# Patient Record
Sex: FEMALE | Race: BLACK OR AFRICAN AMERICAN | NOT HISPANIC OR LATINO | Employment: UNEMPLOYED | URBAN - METROPOLITAN AREA
[De-identification: names, ages, dates, MRNs, and addresses within clinical notes are randomized per-mention and may not be internally consistent; named-entity substitution may affect disease eponyms.]

---

## 2018-01-24 ENCOUNTER — OFFICE VISIT (OUTPATIENT)
Dept: FAMILY MEDICINE CLINIC | Facility: CLINIC | Age: 33
End: 2018-01-24
Payer: COMMERCIAL

## 2018-01-24 VITALS
WEIGHT: 180 LBS | BODY MASS INDEX: 35.34 KG/M2 | SYSTOLIC BLOOD PRESSURE: 110 MMHG | HEIGHT: 60 IN | HEART RATE: 97 BPM | RESPIRATION RATE: 16 BRPM | OXYGEN SATURATION: 99 % | DIASTOLIC BLOOD PRESSURE: 72 MMHG

## 2018-01-24 DIAGNOSIS — F41.1 GAD (GENERALIZED ANXIETY DISORDER): ICD-10-CM

## 2018-01-24 DIAGNOSIS — F17.210 CIGARETTE NICOTINE DEPENDENCE WITHOUT COMPLICATION: ICD-10-CM

## 2018-01-24 DIAGNOSIS — N63.0 BREAST LUMP IN FEMALE: ICD-10-CM

## 2018-01-24 DIAGNOSIS — L83 AN (ACANTHOSIS NIGRICANS): ICD-10-CM

## 2018-01-24 DIAGNOSIS — Z00.00 ANNUAL PHYSICAL EXAM: Primary | ICD-10-CM

## 2018-01-24 DIAGNOSIS — Z76.89 ENCOUNTER TO ESTABLISH CARE: ICD-10-CM

## 2018-01-24 PROCEDURE — 99385 PREV VISIT NEW AGE 18-39: CPT | Performed by: FAMILY MEDICINE

## 2018-01-24 RX ORDER — ALPRAZOLAM 0.25 MG/1
0.25 TABLET ORAL
COMMUNITY
End: 2018-02-09

## 2018-01-24 RX ORDER — BUPROPION HYDROCHLORIDE 150 MG/1
150 TABLET, EXTENDED RELEASE ORAL
COMMUNITY
End: 2018-03-06 | Stop reason: SDUPTHER

## 2018-01-24 NOTE — PROGRESS NOTES
Assessment/Plan:    No problem-specific Assessment & Plan notes found for this encounter  Diagnoses and all orders for this visit:    Encounter to establish care    Breast lump in female  -     US breast left limited (diagnostic)    STELLA (generalized anxiety disorder)  -     TSH baseline; Future  -     Ambulatory referral to behavioral health therapists; Future  -     buPROPion (WELLBUTRIN SR) 150 mg 12 hr tablet; Take 150 mg by mouth  -     ALPRAZolam (XANAX) 0 25 mg tablet; Take 0 25 mg by mouth daily at bedtime as needed for anxiety or sleep  - RTO in 2wks for f/u     Cigarette nicotine dependence without complication  - not ready to quit     Annual physical exam  -     CBC and differential; Future  -     Cholesterol, total; Future  -     Comprehensive metabolic panel; Future    AN (acanthosis nigricans)       Subjective:      Patient ID: Yamileth Barrett is a 34 y o  female      31yo AAF presents to the office with her  to establish care and for an annual   1) HM  - last OV was in  with [facility]  - per pt last PE was 8yrs ago   - PMHx:  x3   - PSHx: none  - Allergies: NKDA, (+) seasonal allergies   - Meds: none   - FHx: M (HTN), F (anemia), S ( - leukemia), S (fibroids), MU (thyroid disease), MGM/PGM (DM), MGM (breast ca with brain mets - dx in her 46s), children with autism denies FHx of colon ca/cervical ca/sudden cardiac death    - Ob/Gyn: last visit was in , last PAP 2016 (nml, denies h/o abnml PAPs), FDLMP: 1/3/2018, gets monthly but irregular since ; no birth control/no condoms   - last vision: no glasses/contact  - last dental: last year; overdue   - Hm: no flu vaccine this season, last Tdap in   - Shx: current smoker - 3cigs/day (1PPD/16yrs), (+) EtOH (wine), denies illicits   2) Generalized Anxiety  - gets anxious when driving and when going into a store  - felt like "everything was closing in on her", felt like "being attacked"   - gets panicked when has to drive on the highway  - feels like "brain is on overload"  - started last year - bought a house last year   - cannot sleep - gets maybe 1hr of sleep at night   - denies personal h/o of eating disorder or seizures   - has not done anything to try to manage this        The following portions of the patient's history were reviewed and updated as appropriate: allergies, current medications, past family history, past medical history, past social history, past surgical history and problem list     Review of Systems   Constitutional: Negative for appetite change, chills and fever  HENT: Negative for congestion, ear pain, rhinorrhea, sinus pressure and sore throat  Eyes: Negative for discharge and visual disturbance  Respiratory: Negative for cough, shortness of breath and wheezing  Cardiovascular: Negative for chest pain, palpitations and leg swelling  Gastrointestinal: Negative for abdominal pain, blood in stool, constipation, diarrhea, nausea and vomiting  Endocrine: Negative for polydipsia and polyuria  Genitourinary: Negative for dysuria, flank pain and hematuria  Skin: Negative for rash  Neurological: Negative for dizziness, numbness and headaches  Hematological: Does not bruise/bleed easily  Psychiatric/Behavioral: Positive for sleep disturbance  The patient is nervous/anxious  And as noted per HPI      Objective:     Physical Exam   Constitutional: She is oriented to person, place, and time  She appears well-developed and well-nourished  She is active  No distress  HENT:   Head: Normocephalic and atraumatic  Right Ear: Tympanic membrane, external ear and ear canal normal    Left Ear: Tympanic membrane, external ear and ear canal normal    Nose: Nose normal  No rhinorrhea or septal deviation  Right sinus exhibits no maxillary sinus tenderness and no frontal sinus tenderness  Left sinus exhibits no maxillary sinus tenderness and no frontal sinus tenderness     Mouth/Throat: Uvula is midline, oropharynx is clear and moist and mucous membranes are normal  No oral lesions  Normal dentition  No oropharyngeal exudate or tonsillar abscesses  Eyes: Conjunctivae, EOM and lids are normal  Pupils are equal, round, and reactive to light  Right eye exhibits no discharge  Left eye exhibits no discharge  No scleral icterus  Neck: Trachea normal  No tracheal deviation present  No thyromegaly present  Cardiovascular: Normal rate, regular rhythm, S1 normal and S2 normal   Exam reveals no gallop and no friction rub  No murmur heard  Pulmonary/Chest: Effort normal and breath sounds normal  No accessory muscle usage  No respiratory distress  She has no wheezes  She has no rhonchi  She has no rales  Abdominal: Soft  Normal appearance and bowel sounds are normal  She exhibits no distension and no mass  There is no hepatosplenomegaly  There is no tenderness  There is no rebound, no guarding and no CVA tenderness  Genitourinary:   Genitourinary Comments: Noted to have a small mobile mass on her L-breast near the nipple, no nipple discharge or axillary LN   Musculoskeletal: She exhibits no edema or tenderness  Lymphadenopathy:     She has no cervical adenopathy  Neurological: She is alert and oriented to person, place, and time  She has normal strength  No sensory deficit  Coordination and gait normal    Skin: Skin is warm  No rash noted  No pallor    (+) acanthosis nigricans    Psychiatric: Her speech is normal and behavior is normal  Judgment normal  Her mood appears anxious   Cognition and memory are normal

## 2018-01-31 ENCOUNTER — HOSPITAL ENCOUNTER (OUTPATIENT)
Dept: RADIOLOGY | Facility: HOSPITAL | Age: 33
Discharge: HOME | End: 2018-01-31

## 2018-01-31 ENCOUNTER — APPOINTMENT (OUTPATIENT)
Dept: LAB | Facility: CLINIC | Age: 33
End: 2018-01-31
Payer: COMMERCIAL

## 2018-01-31 ENCOUNTER — HOSPITAL ENCOUNTER (OUTPATIENT)
Dept: RADIOLOGY | Facility: HOSPITAL | Age: 33
Discharge: HOME | End: 2018-01-31
Payer: COMMERCIAL

## 2018-01-31 ENCOUNTER — TRANSCRIBE ORDERS (OUTPATIENT)
Dept: LAB | Facility: CLINIC | Age: 33
End: 2018-01-31

## 2018-01-31 DIAGNOSIS — F41.1 GAD (GENERALIZED ANXIETY DISORDER): ICD-10-CM

## 2018-01-31 DIAGNOSIS — R92.8 ABNORMAL MAMMOGRAM: ICD-10-CM

## 2018-01-31 DIAGNOSIS — R92.8 ABNORMAL MAMMOGRAM: Primary | ICD-10-CM

## 2018-01-31 DIAGNOSIS — Z00.00 ANNUAL PHYSICAL EXAM: ICD-10-CM

## 2018-01-31 LAB
ALBUMIN SERPL BCP-MCNC: 3.7 G/DL (ref 3.5–5)
ALP SERPL-CCNC: 136 U/L (ref 46–116)
ALT SERPL W P-5'-P-CCNC: 38 U/L (ref 12–78)
ANION GAP SERPL CALCULATED.3IONS-SCNC: 7 MMOL/L (ref 4–13)
AST SERPL W P-5'-P-CCNC: 32 U/L (ref 5–45)
BASOPHILS # BLD AUTO: 0.04 THOUSANDS/ΜL (ref 0–0.1)
BASOPHILS NFR BLD AUTO: 1 % (ref 0–1)
BILIRUB SERPL-MCNC: 0.44 MG/DL (ref 0.2–1)
BUN SERPL-MCNC: 8 MG/DL (ref 5–25)
CALCIUM SERPL-MCNC: 9.1 MG/DL (ref 8.3–10.1)
CHLORIDE SERPL-SCNC: 104 MMOL/L (ref 100–108)
CHOLEST SERPL-MCNC: 204 MG/DL (ref 50–200)
CO2 SERPL-SCNC: 26 MMOL/L (ref 21–32)
CREAT SERPL-MCNC: 0.82 MG/DL (ref 0.6–1.3)
EOSINOPHIL # BLD AUTO: 0.32 THOUSAND/ΜL (ref 0–0.61)
EOSINOPHIL NFR BLD AUTO: 5 % (ref 0–6)
ERYTHROCYTE [DISTWIDTH] IN BLOOD BY AUTOMATED COUNT: 13.4 % (ref 11.6–15.1)
GFR SERPL CREATININE-BSD FRML MDRD: 109 ML/MIN/1.73SQ M
GLUCOSE P FAST SERPL-MCNC: 93 MG/DL (ref 65–99)
HCT VFR BLD AUTO: 38.6 % (ref 34.8–46.1)
HGB BLD-MCNC: 12.9 G/DL (ref 11.5–15.4)
LYMPHOCYTES # BLD AUTO: 2.35 THOUSANDS/ΜL (ref 0.6–4.47)
LYMPHOCYTES NFR BLD AUTO: 33 % (ref 14–44)
MCH RBC QN AUTO: 30.3 PG (ref 26.8–34.3)
MCHC RBC AUTO-ENTMCNC: 33.4 G/DL (ref 31.4–37.4)
MCV RBC AUTO: 91 FL (ref 82–98)
MONOCYTES # BLD AUTO: 0.69 THOUSAND/ΜL (ref 0.17–1.22)
MONOCYTES NFR BLD AUTO: 10 % (ref 4–12)
NEUTROPHILS # BLD AUTO: 3.65 THOUSANDS/ΜL (ref 1.85–7.62)
NEUTS SEG NFR BLD AUTO: 51 % (ref 43–75)
NRBC BLD AUTO-RTO: 0 /100 WBCS
PLATELET # BLD AUTO: 368 THOUSANDS/UL (ref 149–390)
PMV BLD AUTO: 10.1 FL (ref 8.9–12.7)
POTASSIUM SERPL-SCNC: 4 MMOL/L (ref 3.5–5.3)
PROT SERPL-MCNC: 9.4 G/DL (ref 6.4–8.2)
RBC # BLD AUTO: 4.26 MILLION/UL (ref 3.81–5.12)
SODIUM SERPL-SCNC: 137 MMOL/L (ref 136–145)
TSH SERPL DL<=0.05 MIU/L-ACNC: 2.5 UIU/ML
WBC # BLD AUTO: 7.07 THOUSAND/UL (ref 4.31–10.16)

## 2018-01-31 PROCEDURE — 80053 COMPREHEN METABOLIC PANEL: CPT

## 2018-01-31 PROCEDURE — 85025 COMPLETE CBC W/AUTO DIFF WBC: CPT

## 2018-01-31 PROCEDURE — 82465 ASSAY BLD/SERUM CHOLESTEROL: CPT

## 2018-01-31 PROCEDURE — 76642 ULTRASOUND BREAST LIMITED: CPT

## 2018-01-31 PROCEDURE — 36415 COLL VENOUS BLD VENIPUNCTURE: CPT

## 2018-01-31 PROCEDURE — 77066 DX MAMMO INCL CAD BI: CPT

## 2018-01-31 PROCEDURE — 84443 ASSAY THYROID STIM HORMONE: CPT

## 2018-02-05 ENCOUNTER — OFFICE VISIT (OUTPATIENT)
Dept: BEHAVIORAL/MENTAL HEALTH CLINIC | Facility: CLINIC | Age: 33
End: 2018-02-05
Payer: COMMERCIAL

## 2018-02-05 DIAGNOSIS — F41.1 GAD (GENERALIZED ANXIETY DISORDER): Primary | ICD-10-CM

## 2018-02-05 PROCEDURE — 90834 PSYTX W PT 45 MINUTES: CPT | Performed by: SOCIAL WORKER

## 2018-02-05 NOTE — PROGRESS NOTES
Assessment/Plan:      Diagnoses and all orders for this visit:    STELLA (generalized anxiety disorder)          Subjective:     Patient ID: Edouard Neal is a 34 y o  female  D:Met with pt for an initial session  Pt states that "the doctor says I have anxiety"  Pt reports that she feels she is stressed but that she has not considered in the past that she was anxious  Discussed pt's triggers being grocery stores and driving  Pt states that she often feels "trapped", "closed in", or that she is hyperventilating  Discussed how pt has been coping with these feelings up until this point and praised pt for being able to cope on her own  Discussed pt's racing thoughts and encouraged her to try witting some things down and keeping lists to assist with her tendencies to fixate on things she needs to do  Pt will follow up in 3 weeks for continued support  A: Pt was prescribed Wellbutrin and Xanax as needed  Pt reports that she does not feel any different on the Wellbutrin and that she has not taken the Xanax often  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, 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  Pt is consistently having to go to the school to help with situations pertaining to her children  Pt wanted to return to work but has not been able to  Pt's  is "what he calls supportive" but pt reports it is not always in the best way for her  Pt does not sleep much at night due to her racing thoughts  P: Pt is going to start trying to write more things down to keep them on track and off her mind  Pt will follow up with this worker in 3 weeks as she was not ready yet to commit to ongoing OP therapy           Review of Systems   Psychiatric/Behavioral: Positive for decreased concentration and sleep disturbance  Negative for agitation, behavioral problems, confusion, dysphoric mood, hallucinations, self-injury and suicidal ideas  The patient is nervous/anxious   The patient is not hyperactive  Objective:     Physical Exam   Psychiatric: Her speech is normal and behavior is normal  Judgment and thought content normal  Her mood appears anxious   Cognition and memory are normal

## 2018-02-09 ENCOUNTER — OFFICE VISIT (OUTPATIENT)
Dept: FAMILY MEDICINE CLINIC | Facility: CLINIC | Age: 33
End: 2018-02-09
Payer: COMMERCIAL

## 2018-02-09 VITALS
OXYGEN SATURATION: 98 % | WEIGHT: 179 LBS | SYSTOLIC BLOOD PRESSURE: 102 MMHG | BODY MASS INDEX: 35.14 KG/M2 | DIASTOLIC BLOOD PRESSURE: 74 MMHG | RESPIRATION RATE: 16 BRPM | HEART RATE: 105 BPM | HEIGHT: 60 IN

## 2018-02-09 DIAGNOSIS — E66.9 OBESITY (BMI 35.0-39.9 WITHOUT COMORBIDITY): ICD-10-CM

## 2018-02-09 DIAGNOSIS — J06.9 VIRAL UPPER RESPIRATORY ILLNESS: ICD-10-CM

## 2018-02-09 DIAGNOSIS — F17.219 CIGARETTE NICOTINE DEPENDENCE WITH NICOTINE-INDUCED DISORDER: ICD-10-CM

## 2018-02-09 DIAGNOSIS — F41.1 GAD (GENERALIZED ANXIETY DISORDER): Primary | ICD-10-CM

## 2018-02-09 DIAGNOSIS — G47.30 SLEEP APNEA, UNSPECIFIED TYPE: ICD-10-CM

## 2018-02-09 DIAGNOSIS — F41.0 PANIC ATTACKS: ICD-10-CM

## 2018-02-09 PROCEDURE — 99214 OFFICE O/P EST MOD 30 MIN: CPT | Performed by: FAMILY MEDICINE

## 2018-02-09 RX ORDER — ALPRAZOLAM 0.5 MG/1
0.5 TABLET ORAL
Qty: 10 TABLET | Refills: 0 | Status: SHIPPED | OUTPATIENT
Start: 2018-02-09 | End: 2018-05-24 | Stop reason: SDUPTHER

## 2018-02-09 NOTE — PROGRESS NOTES
Assessment/Plan:    No problem-specific Assessment & Plan notes found for this encounter  Diagnoses and all orders for this visit:    STELLA (generalized anxiety disorder)  -    Cont Wellbutrin 300mg QD and tolerating medication well   - denies SI/HI  - new rx for ALPRAZolam (XANAX) 0 5 mg tablet; Take 1 tablet (0 5 mg total) by mouth daily at bedtime as needed for anxiety (NOTE: increased dose from last OV - went from 0 25mg to 0 5)   - encouraged follow-up with Ventura Salguero and to find a long-term therapist   - Román Saini in 2wks for f/u     Cigarette nicotine dependence with nicotine-induced disorder    Sleep apnea, unspecified type  -     Ambulatory referral to Sleep Medicine; Future    Viral upper respiratory illness  - advised supportive care with [employer], Robitussin for cough  - encouraged fluids, hot tea with honey to soothe the throat  - encouraged OTC nasal spray and using a humidifier at home at night   - educated that can take 7-10days to resolve  - frequent hand-washing to prevent the spread of infection   - advised to call the office IF have a fever >101, cough not improving        Subjective:      Patient ID: Ramiro Ferrari is a 34 y o  female      31yo F presents to the office for f/u  1) Cold s/s  - started yesterday   - s/s: (+) runny nose/HA/sinus pressure/sore throat/chest tenderness/cough productive for phlegm  - denies F/C/N/V/ear-ache/abd pain/D/C/urinary symptoms   - has been tolerating fluids (drinking tea) but no real appetite   - has chronically been waking up in the middle of the night gasping for year   - has not tried any OTC meds  - did not get the flu vaccine this season  - (+) sick contacts - kids sick (just diagnosed with Strep)   2) Generalized Anxiety  - currently on Wellbutrin 300mg QD and on Xanax 0 25mg QHS PRN (was given 10days worth at last OV)  - did see Ventura Salguero on the 5th and was advised to find a long-term therapist   - has her next appt with Tex Callejas on 2/26  - had a "bad panic attack" on Monday - felt like the "world was closing in",  was "yelling at her" to calm down   - feels like "brain is on overload"  - started last year - bought a house last year   - cannot sleep - gets maybe 1hr of sleep at night   - denies personal h/o of eating disorder or seizures   3) Diagnostics  - discussed labs and breast US/Mammo results with pt         The following portions of the patient's history were reviewed and updated as appropriate: allergies, current medications, past family history, past medical history, past social history, past surgical history and problem list     Review of Systems  as per HPI     Objective:    Vitals:    02/09/18 1036   BP: 102/74   Pulse: 105   Resp: 16   SpO2: 98%        Physical Exam   Constitutional: She is oriented to person, place, and time  She appears well-developed and well-nourished  No distress  HENT:   Head: Normocephalic and atraumatic  Right Ear: External ear normal    Left Ear: External ear normal    Nose: Nose normal    Mouth/Throat: Oropharynx is clear and moist  No oropharyngeal exudate  Eyes: Conjunctivae and EOM are normal  Pupils are equal, round, and reactive to light  Right eye exhibits no discharge  Left eye exhibits no discharge  No scleral icterus  Neck: Normal range of motion  Neck supple  Cardiovascular: Normal rate and regular rhythm  Exam reveals no gallop and no friction rub  No murmur heard  Pulmonary/Chest: Effort normal and breath sounds normal  No respiratory distress  She has no wheezes  She has no rales  Abdominal: Soft  Bowel sounds are normal  She exhibits no distension  There is no tenderness  Obese abdomen    Neurological: She is alert and oriented to person, place, and time  Skin: Skin is warm  Psychiatric: Her speech is normal and behavior is normal  Judgment and thought content normal  Her mood appears anxious   Cognition and memory are normal

## 2018-03-06 DIAGNOSIS — F41.1 GAD (GENERALIZED ANXIETY DISORDER): Primary | ICD-10-CM

## 2018-03-07 RX ORDER — BUPROPION HYDROCHLORIDE 150 MG/1
150 TABLET, EXTENDED RELEASE ORAL 2 TIMES DAILY
Qty: 60 TABLET | Refills: 0 | Status: SHIPPED | OUTPATIENT
Start: 2018-03-07 | End: 2018-05-24 | Stop reason: ALTCHOICE

## 2018-05-24 ENCOUNTER — TRANSCRIBE ORDERS (OUTPATIENT)
Dept: LAB | Facility: CLINIC | Age: 33
End: 2018-05-24

## 2018-05-24 ENCOUNTER — OFFICE VISIT (OUTPATIENT)
Dept: FAMILY MEDICINE CLINIC | Facility: CLINIC | Age: 33
End: 2018-05-24
Payer: COMMERCIAL

## 2018-05-24 ENCOUNTER — APPOINTMENT (OUTPATIENT)
Dept: LAB | Facility: CLINIC | Age: 33
End: 2018-05-24
Payer: COMMERCIAL

## 2018-05-24 VITALS
BODY MASS INDEX: 35.14 KG/M2 | HEART RATE: 86 BPM | SYSTOLIC BLOOD PRESSURE: 120 MMHG | TEMPERATURE: 97.3 F | RESPIRATION RATE: 18 BRPM | DIASTOLIC BLOOD PRESSURE: 82 MMHG | OXYGEN SATURATION: 99 % | WEIGHT: 179 LBS | HEIGHT: 60 IN

## 2018-05-24 DIAGNOSIS — F41.8 ANXIETY WITH DEPRESSION: Primary | ICD-10-CM

## 2018-05-24 DIAGNOSIS — F41.8 ANXIETY WITH DEPRESSION: ICD-10-CM

## 2018-05-24 DIAGNOSIS — R11.0 NAUSEA: ICD-10-CM

## 2018-05-24 PROBLEM — F32.9 REACTIVE DEPRESSION: Status: ACTIVE | Noted: 2018-05-24

## 2018-05-24 LAB
ALBUMIN SERPL BCP-MCNC: 3.4 G/DL (ref 3.5–5)
ALP SERPL-CCNC: 152 U/L (ref 46–116)
ALT SERPL W P-5'-P-CCNC: 56 U/L (ref 12–78)
ANION GAP SERPL CALCULATED.3IONS-SCNC: 6 MMOL/L (ref 4–13)
AST SERPL W P-5'-P-CCNC: 35 U/L (ref 5–45)
BASOPHILS # BLD AUTO: 0.04 THOUSANDS/ΜL (ref 0–0.1)
BASOPHILS NFR BLD AUTO: 1 % (ref 0–1)
BILIRUB SERPL-MCNC: 0.58 MG/DL (ref 0.2–1)
BUN SERPL-MCNC: 9 MG/DL (ref 5–25)
CALCIUM SERPL-MCNC: 9.2 MG/DL (ref 8.3–10.1)
CHLORIDE SERPL-SCNC: 104 MMOL/L (ref 100–108)
CO2 SERPL-SCNC: 25 MMOL/L (ref 21–32)
CREAT SERPL-MCNC: 0.76 MG/DL (ref 0.6–1.3)
EOSINOPHIL # BLD AUTO: 0.38 THOUSAND/ΜL (ref 0–0.61)
EOSINOPHIL NFR BLD AUTO: 5 % (ref 0–6)
ERYTHROCYTE [DISTWIDTH] IN BLOOD BY AUTOMATED COUNT: 13.8 % (ref 11.6–15.1)
GFR SERPL CREATININE-BSD FRML MDRD: 118 ML/MIN/1.73SQ M
GLUCOSE P FAST SERPL-MCNC: 88 MG/DL (ref 65–99)
HCT VFR BLD AUTO: 40.1 % (ref 34.8–46.1)
HGB BLD-MCNC: 12.8 G/DL (ref 11.5–15.4)
LYMPHOCYTES # BLD AUTO: 2.59 THOUSANDS/ΜL (ref 0.6–4.47)
LYMPHOCYTES NFR BLD AUTO: 32 % (ref 14–44)
MCH RBC QN AUTO: 29.8 PG (ref 26.8–34.3)
MCHC RBC AUTO-ENTMCNC: 31.9 G/DL (ref 31.4–37.4)
MCV RBC AUTO: 93 FL (ref 82–98)
MONOCYTES # BLD AUTO: 0.96 THOUSAND/ΜL (ref 0.17–1.22)
MONOCYTES NFR BLD AUTO: 12 % (ref 4–12)
NEUTROPHILS # BLD AUTO: 4.21 THOUSANDS/ΜL (ref 1.85–7.62)
NEUTS SEG NFR BLD AUTO: 51 % (ref 43–75)
NRBC BLD AUTO-RTO: 0 /100 WBCS
PLATELET # BLD AUTO: 363 THOUSANDS/UL (ref 149–390)
PMV BLD AUTO: 10.8 FL (ref 8.9–12.7)
POTASSIUM SERPL-SCNC: 4 MMOL/L (ref 3.5–5.3)
PROT SERPL-MCNC: 8.9 G/DL (ref 6.4–8.2)
RBC # BLD AUTO: 4.3 MILLION/UL (ref 3.81–5.12)
SL AMB POCT URINE HCG: NORMAL
SODIUM SERPL-SCNC: 135 MMOL/L (ref 136–145)
TSH SERPL DL<=0.05 MIU/L-ACNC: 2.53 UIU/ML (ref 0.36–3.74)
WBC # BLD AUTO: 8.21 THOUSAND/UL (ref 4.31–10.16)

## 2018-05-24 PROCEDURE — 84443 ASSAY THYROID STIM HORMONE: CPT

## 2018-05-24 PROCEDURE — 85025 COMPLETE CBC W/AUTO DIFF WBC: CPT

## 2018-05-24 PROCEDURE — 36415 COLL VENOUS BLD VENIPUNCTURE: CPT

## 2018-05-24 PROCEDURE — 99213 OFFICE O/P EST LOW 20 MIN: CPT | Performed by: NURSE PRACTITIONER

## 2018-05-24 PROCEDURE — 81025 URINE PREGNANCY TEST: CPT | Performed by: NURSE PRACTITIONER

## 2018-05-24 PROCEDURE — 80053 COMPREHEN METABOLIC PANEL: CPT

## 2018-05-24 RX ORDER — ALPRAZOLAM 0.5 MG/1
0.5 TABLET ORAL DAILY PRN
Qty: 10 TABLET | Refills: 0 | Status: SHIPPED | OUTPATIENT
Start: 2018-05-24 | End: 2018-05-24 | Stop reason: SDUPTHER

## 2018-05-24 RX ORDER — ALPRAZOLAM 0.5 MG/1
0.5 TABLET ORAL DAILY PRN
Qty: 10 TABLET | Refills: 0
Start: 2018-05-24 | End: 2019-04-24

## 2018-05-24 NOTE — ASSESSMENT & PLAN NOTE
Discussion on anxiety and depression and treatment options  PDMP checked  Refilled Xanax  Patient denies any suicidal thoughts  Lab work ordered  Patient reports that she would like to discuss daily medication with her PCP, Dr Michael Araujo

## 2018-05-24 NOTE — PROGRESS NOTES
Assessment/Plan:    Anxiety with depression  Discussion on anxiety and depression and treatment options  PDMP checked  Refilled Xanax  Patient instructed to take the xanax daily prn for anxiety  Patient denies any suicidal thoughts  Lab work ordered  Patient reports that she would like to discuss daily medication with her PCP, Dr Priscilla Sanchez  Nausea  Urine HCG done and was negative  Discussed with the patient that her symptoms are most likely caused by her anxiety  Lab work ordered  Diagnoses and all orders for this visit:    Anxiety with depression  -     Comprehensive metabolic panel; Future  -     CBC and differential; Future  -     TSH, 3rd generation with T4 reflex; Future  -     ALPRAZolam (XANAX) 0 5 mg tablet; Take 1 tablet (0 5 mg total) by mouth daily as needed for anxiety    Nausea  -     POCT urine HCG    Patient instructed to follow-up in 5 days with Dr Priscilla Sanchez or sooner prn  Subjective:      Patient ID: Richard Gould is a 28 y o  female  Patient is here for a follow-up for anxiety  Patient's PCP is Dr Priscilla Sanchez  Patient reports that she stopped taking the Wellbutrin for anxiety because she felt itchy while taking it  Patient reports that she never got her last refill for xanax filled because she was feeling less anxious  Patient reports that her depression and anxiety has gotten worse over the past few days  Denies any suicidal or homicidal thoughts  Patient reports lots of social support  Patient reports occasional nausea off and on for a couple of weeks  Denies any abdominal pain, reflux, diarrhea, vomiting, or blood in the stool  Patient reports a lot of stress recently  Patient reports that she has 3 kids and 2 of them are on the autism spectrum  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  Patient reports that she is worried about her health  Patient reports that her anxiety and depression has gotten worse           The following portions of the patient's history were reviewed and updated as appropriate: allergies, current medications, past family history, past medical history, past social history, past surgical history and problem list     Review of Systems   Constitutional: Positive for fatigue  Negative for chills and fever  HENT: Negative for congestion, ear pain, sinus pressure and sore throat  Eyes: Negative for pain, discharge and redness  Respiratory: Negative for cough, chest tightness, shortness of breath and wheezing  Cardiovascular: Negative for chest pain, palpitations and leg swelling  Gastrointestinal: Positive for nausea  Negative for abdominal pain, blood in stool, diarrhea and vomiting  Genitourinary: Negative for dysuria, frequency, hematuria, pelvic pain and urgency  Skin: Negative for rash  Neurological: Negative for dizziness, seizures, syncope, light-headedness, numbness and headaches  Psychiatric/Behavioral: Negative for suicidal ideas  As noted in HPI  Objective:      /82   Pulse 86   Temp (!) 97 3 °F (36 3 °C)   Resp 18   Ht 5' (1 524 m)   Wt 81 2 kg (179 lb)   LMP 04/24/2018   SpO2 99%   BMI 34 96 kg/m²          Physical Exam   Constitutional: She is oriented to person, place, and time  No distress  HENT:   Right Ear: External ear normal    Left Ear: External ear normal    Mouth/Throat: Oropharynx is clear and moist    Eyes: Conjunctivae are normal  Pupils are equal, round, and reactive to light  Neck: Normal range of motion  Cardiovascular: Normal rate, regular rhythm and normal heart sounds  Radial pulses +2 bilaterally  No edema noted  Pulmonary/Chest: Effort normal and breath sounds normal  She has no wheezes  Musculoskeletal:   Gait wnl  Lymphadenopathy:     She has no cervical adenopathy  Neurological: She is alert and oriented to person, place, and time  Skin: No rash noted  Psychiatric:   Patient appears alittle anxious  Vitals reviewed

## 2018-05-24 NOTE — ASSESSMENT & PLAN NOTE
Urine HCG done and was negative  Discussed with the patient that her symptoms are most likely caused by her anxiety  Lab work ordered

## 2018-06-01 ENCOUNTER — OFFICE VISIT (OUTPATIENT)
Dept: FAMILY MEDICINE CLINIC | Facility: CLINIC | Age: 33
End: 2018-06-01
Payer: COMMERCIAL

## 2018-06-01 VITALS
OXYGEN SATURATION: 99 % | HEART RATE: 90 BPM | WEIGHT: 179.4 LBS | HEIGHT: 60 IN | DIASTOLIC BLOOD PRESSURE: 78 MMHG | RESPIRATION RATE: 16 BRPM | SYSTOLIC BLOOD PRESSURE: 112 MMHG | BODY MASS INDEX: 35.22 KG/M2

## 2018-06-01 DIAGNOSIS — E66.9 OBESITY (BMI 35.0-39.9 WITHOUT COMORBIDITY): ICD-10-CM

## 2018-06-01 DIAGNOSIS — R74.8 ELEVATED ALKALINE PHOSPHATASE LEVEL: ICD-10-CM

## 2018-06-01 DIAGNOSIS — F41.8 ANXIETY WITH DEPRESSION: Primary | ICD-10-CM

## 2018-06-01 PROCEDURE — 4004F PT TOBACCO SCREEN RCVD TLK: CPT | Performed by: FAMILY MEDICINE

## 2018-06-01 PROCEDURE — 99214 OFFICE O/P EST MOD 30 MIN: CPT | Performed by: FAMILY MEDICINE

## 2018-06-01 PROCEDURE — 3008F BODY MASS INDEX DOCD: CPT | Performed by: FAMILY MEDICINE

## 2018-06-01 RX ORDER — BUPROPION HYDROCHLORIDE 150 MG/1
150 TABLET ORAL DAILY
Qty: 30 TABLET | Refills: 0 | Status: SHIPPED | OUTPATIENT
Start: 2018-06-01 | End: 2019-04-24

## 2018-06-01 NOTE — PROGRESS NOTES
Assessment/Plan:  No problem-specific Assessment & Plan notes found for this encounter  Diagnoses and all orders for this visit:  Anxiety with depression  - PHQ-9 score of 20 in the office today   - denies SI/HI  - would like to restart Wellbutrin given the positive ADR of nicotine craving withdrawal   - advised to make a f/u appt with [facility]   - restart buPROPion (WELLBUTRIN XL) 150 mg 24 hr tablet; Take 1 tablet (150 mg total) by mouth daily  - RTO in 2wks for f/u     Obesity (BMI 35 0-39 9 without comorbidity)  -  Ambulatory referral to Nutrition Services; Future  - TSH within normal limits  - educated that it takes 3500cal to lose 1lbs and to set realistic weight loss goals  - advised to keep a food journal and to also eat several small meals throughout the day vs not eating as not eating places body in "starvation mood" and propagates weight gain   - encouraged exercise - 150mins/week as per Heart Association   - advised to use the Nutritional Services at [facility] where a nutritionist shops along side with you to help you choose healthy options that fit in your budget   - RTO in 1month for f/u         Subjective:    Patient ID: Neno Palacios is a 28 y o  female    31yo F presents to the office with her daughter for f/u   1) Depression with Anxiety   - last week was "all over the place" - woke up and felt like "was here but wasn't"  - felt an anxiety attack - "an outer body experience"   - couldn't sleep, felt like in a "changed area", took about an hour to calm down   - of note, stopped taking Wellbutrin end of Feb bc felt "itchy" on it and maybe had a rash on her b/l arms and had been "doing good" for awhile   - wakes up nauseous - serum HCG NEG   - 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   - has been trying to exercise: walking Qday - frustrated bc not losing weight   - PHQ-9 20 in the office today   - denies SI/HI  - did meet with [facility] in the past and felt like benefited from therapy - would like to cont with female therapist  2) BMI 35  - weight up [address] since Feb   - has been trying to exercise: walking Qday - frustrated bc not losing weight   - has been trying to follow the [address] for the past 2yrs but notices that she really doesn't eat anything - maybe a salad for dinner  - has not gone to the Nutritionist   - TSH nml, FBS nml       The following portions of the patient's history were reviewed and updated as appropriate: allergies, current medications, past family history, past medical history, past social history, past surgical history and problem list     Review of Systems  as per HPI    Objective:  /78   Pulse 90   Resp 16   Ht 5' (1 524 m)   Wt 81 4 kg (179 lb 6 4 oz)   SpO2 99%   BMI 35 04 kg/m²    Physical Exam   Constitutional: She is oriented to person, place, and time  She appears well-developed and well-nourished  No distress  HENT:   Head: Normocephalic and atraumatic  Nose: Nose normal    Eyes: Conjunctivae and EOM are normal  Right eye exhibits no discharge  Left eye exhibits no discharge  No scleral icterus  Neck: Normal range of motion  Cardiovascular: Normal rate, regular rhythm and normal heart sounds  Exam reveals no gallop and no friction rub  No murmur heard  Pulmonary/Chest: Effort normal and breath sounds normal  No stridor  No respiratory distress  She has no wheezes  She has no rales  Abdominal: Soft  Bowel sounds are normal    Obese abdomen, BMI 35   Musculoskeletal: Normal range of motion  She exhibits no edema, tenderness or deformity  Neurological: She is alert and oriented to person, place, and time  Skin: Skin is warm and dry  She is not diaphoretic  Psychiatric: Her speech is normal and behavior is normal  Judgment and thought content normal  Her mood appears anxious  Cognition and memory are normal  She exhibits a depressed mood  She expresses no homicidal and no suicidal ideation  She expresses no suicidal plans and no homicidal plans  PHQ-9 score of 20   Vitals reviewed

## 2019-04-24 ENCOUNTER — OFFICE VISIT (OUTPATIENT)
Dept: FAMILY MEDICINE CLINIC | Facility: CLINIC | Age: 34
End: 2019-04-24
Payer: COMMERCIAL

## 2019-04-24 VITALS
DIASTOLIC BLOOD PRESSURE: 70 MMHG | WEIGHT: 181 LBS | RESPIRATION RATE: 16 BRPM | BODY MASS INDEX: 35.53 KG/M2 | HEIGHT: 60 IN | OXYGEN SATURATION: 99 % | HEART RATE: 90 BPM | SYSTOLIC BLOOD PRESSURE: 112 MMHG

## 2019-04-24 DIAGNOSIS — Q15.9 EYE ABNORMALITY: ICD-10-CM

## 2019-04-24 DIAGNOSIS — F41.8 ANXIETY WITH DEPRESSION: Primary | ICD-10-CM

## 2019-04-24 DIAGNOSIS — E66.9 OBESITY (BMI 35.0-39.9 WITHOUT COMORBIDITY): ICD-10-CM

## 2019-04-24 PROCEDURE — 99214 OFFICE O/P EST MOD 30 MIN: CPT | Performed by: FAMILY MEDICINE

## 2019-04-24 RX ORDER — ALPRAZOLAM 0.5 MG/1
0.5 TABLET ORAL
Qty: 10 TABLET | Refills: 0 | Status: SHIPPED | OUTPATIENT
Start: 2019-04-24 | End: 2019-08-06 | Stop reason: SDUPTHER

## 2019-04-24 RX ORDER — BUPROPION HYDROCHLORIDE 150 MG/1
150 TABLET ORAL DAILY
Qty: 30 TABLET | Refills: 0 | Status: SHIPPED | OUTPATIENT
Start: 2019-04-24 | End: 2019-05-15 | Stop reason: SDUPTHER

## 2019-05-15 ENCOUNTER — OFFICE VISIT (OUTPATIENT)
Dept: FAMILY MEDICINE CLINIC | Facility: CLINIC | Age: 34
End: 2019-05-15
Payer: COMMERCIAL

## 2019-05-15 VITALS
DIASTOLIC BLOOD PRESSURE: 80 MMHG | WEIGHT: 183 LBS | HEIGHT: 60 IN | HEART RATE: 100 BPM | RESPIRATION RATE: 18 BRPM | SYSTOLIC BLOOD PRESSURE: 124 MMHG | OXYGEN SATURATION: 98 % | BODY MASS INDEX: 35.93 KG/M2

## 2019-05-15 DIAGNOSIS — F17.200 CURRENT EVERY DAY SMOKER: ICD-10-CM

## 2019-05-15 DIAGNOSIS — E66.9 OBESITY (BMI 35.0-39.9 WITHOUT COMORBIDITY): ICD-10-CM

## 2019-05-15 DIAGNOSIS — F41.8 ANXIETY WITH DEPRESSION: Primary | ICD-10-CM

## 2019-05-15 DIAGNOSIS — Q15.9 EYE ABNORMALITY: ICD-10-CM

## 2019-05-15 PROCEDURE — 99214 OFFICE O/P EST MOD 30 MIN: CPT | Performed by: FAMILY MEDICINE

## 2019-05-15 RX ORDER — BUPROPION HYDROCHLORIDE 150 MG/1
150 TABLET ORAL DAILY
Qty: 30 TABLET | Refills: 0 | Status: SHIPPED | OUTPATIENT
Start: 2019-05-15 | End: 2019-08-06 | Stop reason: SDUPTHER

## 2019-06-05 ENCOUNTER — TELEPHONE (OUTPATIENT)
Dept: FAMILY MEDICINE CLINIC | Facility: CLINIC | Age: 34
End: 2019-06-05

## 2019-06-05 ENCOUNTER — OFFICE VISIT (OUTPATIENT)
Dept: FAMILY MEDICINE CLINIC | Facility: CLINIC | Age: 34
End: 2019-06-05
Payer: COMMERCIAL

## 2019-06-05 VITALS
DIASTOLIC BLOOD PRESSURE: 84 MMHG | OXYGEN SATURATION: 97 % | HEIGHT: 60 IN | SYSTOLIC BLOOD PRESSURE: 118 MMHG | HEART RATE: 104 BPM | WEIGHT: 183 LBS | RESPIRATION RATE: 18 BRPM | BODY MASS INDEX: 35.93 KG/M2

## 2019-06-05 DIAGNOSIS — Q15.9 EYE ABNORMALITY: ICD-10-CM

## 2019-06-05 DIAGNOSIS — E66.9 OBESITY (BMI 35.0-39.9 WITHOUT COMORBIDITY): ICD-10-CM

## 2019-06-05 DIAGNOSIS — F41.8 ANXIETY WITH DEPRESSION: Primary | ICD-10-CM

## 2019-06-05 DIAGNOSIS — F17.200 CURRENT EVERY DAY SMOKER: ICD-10-CM

## 2019-06-05 PROCEDURE — 99214 OFFICE O/P EST MOD 30 MIN: CPT | Performed by: FAMILY MEDICINE

## 2019-06-05 PROCEDURE — 3008F BODY MASS INDEX DOCD: CPT | Performed by: FAMILY MEDICINE

## 2019-06-07 ENCOUNTER — TELEPHONE (OUTPATIENT)
Dept: BEHAVIORAL/MENTAL HEALTH CLINIC | Facility: CLINIC | Age: 34
End: 2019-06-07

## 2019-06-10 ENCOUNTER — TELEPHONE (OUTPATIENT)
Dept: BEHAVIORAL/MENTAL HEALTH CLINIC | Facility: CLINIC | Age: 34
End: 2019-06-10

## 2019-06-10 ENCOUNTER — TRANSCRIBE ORDERS (OUTPATIENT)
Dept: ADMINISTRATIVE | Facility: HOSPITAL | Age: 34
End: 2019-06-10

## 2019-06-10 DIAGNOSIS — H53.2 DIPLOPIA: Primary | ICD-10-CM

## 2019-06-10 DIAGNOSIS — R51.9 NONINTRACTABLE HEADACHE, UNSPECIFIED CHRONICITY PATTERN, UNSPECIFIED HEADACHE TYPE: ICD-10-CM

## 2019-06-25 ENCOUNTER — APPOINTMENT (OUTPATIENT)
Dept: LAB | Facility: CLINIC | Age: 34
End: 2019-06-25
Payer: COMMERCIAL

## 2019-06-25 ENCOUNTER — TRANSCRIBE ORDERS (OUTPATIENT)
Dept: LAB | Facility: CLINIC | Age: 34
End: 2019-06-25

## 2019-06-25 DIAGNOSIS — R49.0 DYSPHONIA: Primary | ICD-10-CM

## 2019-06-25 DIAGNOSIS — R49.0 DYSPHONIA: ICD-10-CM

## 2019-06-25 LAB
ALBUMIN SERPL BCP-MCNC: 3.7 G/DL (ref 3.5–5)
ALP SERPL-CCNC: 214 U/L (ref 46–116)
ALT SERPL W P-5'-P-CCNC: 44 U/L (ref 12–78)
ANION GAP SERPL CALCULATED.3IONS-SCNC: 9 MMOL/L (ref 4–13)
AST SERPL W P-5'-P-CCNC: 40 U/L (ref 5–45)
BILIRUB SERPL-MCNC: 0.6 MG/DL (ref 0.2–1)
BUN SERPL-MCNC: 7 MG/DL (ref 5–25)
CALCIUM SERPL-MCNC: 9.7 MG/DL (ref 8.3–10.1)
CHLORIDE SERPL-SCNC: 102 MMOL/L (ref 100–108)
CO2 SERPL-SCNC: 28 MMOL/L (ref 21–32)
CREAT SERPL-MCNC: 0.96 MG/DL (ref 0.6–1.3)
ERYTHROCYTE [SEDIMENTATION RATE] IN BLOOD: 35 MM/HOUR (ref 0–20)
GFR SERPL CREATININE-BSD FRML MDRD: 89 ML/MIN/1.73SQ M
GLUCOSE SERPL-MCNC: 91 MG/DL (ref 65–140)
POTASSIUM SERPL-SCNC: 3.7 MMOL/L (ref 3.5–5.3)
PROT SERPL-MCNC: 8.8 G/DL (ref 6.4–8.2)
SODIUM SERPL-SCNC: 139 MMOL/L (ref 136–145)
TSH SERPL DL<=0.05 MIU/L-ACNC: 1.8 UIU/ML (ref 0.36–3.74)

## 2019-06-25 PROCEDURE — 84238 ASSAY NONENDOCRINE RECEPTOR: CPT

## 2019-06-25 PROCEDURE — 85652 RBC SED RATE AUTOMATED: CPT

## 2019-06-25 PROCEDURE — 84443 ASSAY THYROID STIM HORMONE: CPT

## 2019-06-25 PROCEDURE — 86618 LYME DISEASE ANTIBODY: CPT

## 2019-06-25 PROCEDURE — 83519 RIA NONANTIBODY: CPT

## 2019-06-25 PROCEDURE — 36415 COLL VENOUS BLD VENIPUNCTURE: CPT

## 2019-06-25 PROCEDURE — 80053 COMPREHEN METABOLIC PANEL: CPT

## 2019-06-26 ENCOUNTER — TELEPHONE (OUTPATIENT)
Dept: FAMILY MEDICINE CLINIC | Facility: CLINIC | Age: 34
End: 2019-06-26

## 2019-06-27 ENCOUNTER — CONSULT (OUTPATIENT)
Dept: BARIATRICS | Facility: CLINIC | Age: 34
End: 2019-06-27
Payer: COMMERCIAL

## 2019-06-27 VITALS
TEMPERATURE: 98.8 F | DIASTOLIC BLOOD PRESSURE: 62 MMHG | HEART RATE: 92 BPM | BODY MASS INDEX: 34.29 KG/M2 | WEIGHT: 181.6 LBS | HEIGHT: 61 IN | RESPIRATION RATE: 14 BRPM | SYSTOLIC BLOOD PRESSURE: 100 MMHG

## 2019-06-27 DIAGNOSIS — K21.9 GASTROESOPHAGEAL REFLUX DISEASE, ESOPHAGITIS PRESENCE NOT SPECIFIED: Primary | ICD-10-CM

## 2019-06-27 DIAGNOSIS — E66.9 CLASS 1 OBESITY: ICD-10-CM

## 2019-06-27 DIAGNOSIS — F41.8 ANXIETY WITH DEPRESSION: ICD-10-CM

## 2019-06-27 DIAGNOSIS — R63.5 ABNORMAL WEIGHT GAIN: ICD-10-CM

## 2019-06-27 PROBLEM — E66.811 CLASS 1 OBESITY: Status: ACTIVE | Noted: 2018-02-09

## 2019-06-27 LAB
B BURGDOR IGG SER IA-ACNC: 0.21
B BURGDOR IGM SER IA-ACNC: 0.13

## 2019-06-27 PROCEDURE — 99244 OFF/OP CNSLTJ NEW/EST MOD 40: CPT | Performed by: PHYSICIAN ASSISTANT

## 2019-06-28 PROBLEM — K21.9 GERD (GASTROESOPHAGEAL REFLUX DISEASE): Status: ACTIVE | Noted: 2019-06-28

## 2019-06-28 LAB
ACHR BIND AB SER-SCNC: 0.07 NMOL/L (ref 0–0.24)
ACHR BLOCK AB/ACHR TOTAL SFR SER: 10 % (ref 0–25)

## 2019-06-30 ENCOUNTER — HOSPITAL ENCOUNTER (OUTPATIENT)
Dept: MRI IMAGING | Facility: HOSPITAL | Age: 34
Discharge: HOME | End: 2019-06-30
Attending: OPHTHALMOLOGY
Payer: COMMERCIAL

## 2019-06-30 DIAGNOSIS — R51.9 NONINTRACTABLE HEADACHE, UNSPECIFIED CHRONICITY PATTERN, UNSPECIFIED HEADACHE TYPE: ICD-10-CM

## 2019-06-30 DIAGNOSIS — H53.2 DIPLOPIA: ICD-10-CM

## 2019-06-30 PROCEDURE — A9585 GADOBUTROL INJECTION: HCPCS | Performed by: OPHTHALMOLOGY

## 2019-06-30 PROCEDURE — 70543 MRI ORBT/FAC/NCK W/O &W/DYE: CPT

## 2019-06-30 PROCEDURE — 70553 MRI BRAIN STEM W/O & W/DYE: CPT

## 2019-06-30 RX ADMIN — GADOBUTROL 8 ML: 604.72 INJECTION INTRAVENOUS at 09:33

## 2019-07-04 LAB — ACHR MOD AB/ACHR TOTAL SFR SER: <12 % (ref 0–20)

## 2019-07-25 ENCOUNTER — TELEPHONE (OUTPATIENT)
Dept: FAMILY MEDICINE CLINIC | Facility: CLINIC | Age: 34
End: 2019-07-25

## 2019-07-25 ENCOUNTER — OFFICE VISIT (OUTPATIENT)
Dept: BARIATRICS | Facility: CLINIC | Age: 34
End: 2019-07-25
Payer: COMMERCIAL

## 2019-07-25 VITALS
TEMPERATURE: 98.4 F | RESPIRATION RATE: 14 BRPM | SYSTOLIC BLOOD PRESSURE: 110 MMHG | DIASTOLIC BLOOD PRESSURE: 80 MMHG | WEIGHT: 184.2 LBS | HEIGHT: 61 IN | BODY MASS INDEX: 34.78 KG/M2 | HEART RATE: 95 BPM

## 2019-07-25 DIAGNOSIS — K21.9 GASTROESOPHAGEAL REFLUX DISEASE, ESOPHAGITIS PRESENCE NOT SPECIFIED: ICD-10-CM

## 2019-07-25 DIAGNOSIS — R63.5 ABNORMAL WEIGHT GAIN: Primary | ICD-10-CM

## 2019-07-25 DIAGNOSIS — E66.9 CLASS 1 OBESITY: ICD-10-CM

## 2019-07-25 DIAGNOSIS — F41.8 ANXIETY WITH DEPRESSION: ICD-10-CM

## 2019-07-25 PROCEDURE — 99214 OFFICE O/P EST MOD 30 MIN: CPT | Performed by: PHYSICIAN ASSISTANT

## 2019-07-25 NOTE — TELEPHONE ENCOUNTER
Pt has been seeing [facility], the provider that she sees is going out on medical leave  They wanted her to start a pill for weight loss  She said she can not afford to keep going there every week and wants to know if Dr Radha Campbell give it to her ? I told her that it would need to come from them but she wanted me to send the message anyway

## 2019-07-25 NOTE — ASSESSMENT & PLAN NOTE
-Patient is pursuing [facility]  -Initial weight loss goal of 5-10% weight loss for improved health  -patient reports she cut out all fruit juice and soda  -reports she has been following the 9314-6617 calorie sample menu that was provided to her and has been practicing interval eating instead of having one meal per day  -Also reports increasing her exercise and performing cardio daily  -Recommend patient keep a food log and measure portions for more accuracy  -Given codes to check coverage for REE   Will also check AM cortisol and Prolactin level    Initial: 181 6 lbs  Current: 184 2 lbs  Change: + 2 6 lbs

## 2019-07-25 NOTE — PATIENT INSTRUCTIONS
Goals: Food log (ie ) www [email or web address] com,[email or web address]  com,[email or web address],calorieking  com,etc    No sugary beverages  At least 64oz of water daily  Increase physical activity by 10 minutes daily   Gradually increase physical activity to a goal of 5 days per week for 30 minutes of MODERATE intensity PLUS 2 days per week of FULL BODY resistance training  1064-7881 calories per day  5-10 servings of fruits and vegetables per day   Please try to track calories daily and bring to next appointment  Pepcid 20mg twice per day

## 2019-07-25 NOTE — ASSESSMENT & PLAN NOTE
-followed by PCP and counselor  -recently started on Wellbutrin  -reports she wants to stop Wellbutrin as it has not helped her weight   Advised her not to stop medication without discussing with prescribing provider

## 2019-07-25 NOTE — ASSESSMENT & PLAN NOTE
-complains of heartburn, belching and burping  -relieved with TUMS but is having to take these multiple times per week   -advised avoiding trigger foods, large portion sizes, laying flat for 3 hours after a meal  -Has not trialed Pepcid   Recommend trial of Pepcid 20mg BID until seen at next f/u

## 2019-07-25 NOTE — PROGRESS NOTES
Assessment/Plan:    Class 1 obesity  -Patient is pursuing Conservative Program  -Initial weight loss goal of 5-10% weight loss for improved health  -patient reports she cut out all fruit juice and soda  -reports she has been following the 4135-5038 calorie sample menu that was provided to her and has been practicing interval eating instead of having one meal per day  -Also reports increasing her exercise and performing cardio daily  -Recommend patient keep a food log and measure portions for more accuracy  -Given codes to check coverage for REE  Will also check AM cortisol and Prolactin level    Initial: 181 6 lbs  Current: 184 2 lbs  Change: + 2 6 lbs    Anxiety with depression  -followed by PCP and counselor  -recently started on Wellbutrin  -reports she wants to stop Wellbutrin as it has not helped her weight  Advised her not to stop medication without discussing with prescribing provider    GERD (gastroesophageal reflux disease)  -complains of heartburn, belching and burping  -relieved with TUMS but is having to take these multiple times per week   -advised avoiding trigger foods, large portion sizes, laying flat for 3 hours after a meal  -Has not trialed Pepcid  Recommend trial of Pepcid 20mg BID until seen at next f/u    Goals:    Food log (ie ) www myfitnesspal com,sparkpeople  com,loseit com,calorieking  com,etc    No sugary beverages  At least 64oz of water daily  Increase physical activity by 10 minutes daily  Gradually increase physical activity to a goal of 5 days per week for 30 minutes of MODERATE intensity PLUS 2 days per week of FULL BODY resistance training  3285-8707 calories per day  5-10 servings of fruits and vegetables per day   Please try to track calories daily      Follow up in approximately 4-6 weeks with Non-Surgical Physician/Advanced Practitioner  25 minute visit, >50% face-to-face time spent counseling patient on diet behavior and exercise modification for weight loss       Diagnoses and all orders for this visit:    Abnormal weight gain  Comments:  see plan under class 1 obesity  Orders:  -     Cortisol Level, AM Specimen; Future  -     Prolactin; Future    Class 1 obesity  -     Cortisol Level, AM Specimen; Future  -     Prolactin; Future    Anxiety with depression    Gastroesophageal reflux disease, esophagitis presence not specified          Subjective:   Chief Complaint   Patient presents with    Follow-up     pt is here for mwm follow up  Patient ID: Rose Quinones  is a 27 y o  female with excess weight/obesity here to pursue weight managment  Patient is pursuing Conservative Program      HPI Patient presents for MWM follow up  Reports cut out fruit juice and soda  Food logging: denies  Increased appetite/cravings: denies  Fruit/Vegetable servings: 3  Exercise:20-30 min walking daily + 15-20 min stationary bike  Hydration: 70 oz of water per day    B: 1 egg + feta cheese + spinach OR smoothie ( 1/4 cup yogurt + spinach + strawberries + 1/2 banana + almond milk)  S: almonds  (10)  L:  Whole wheat wrap + Lettuce + grilled chicken wrap + light ranch dressing  S: fruit or peanuts  D: Salad -  1/4 avocado/tomato/peppers + grilled chicken or salmon + balsalmic vinaigrette   S: skips    The following portions of the patient's history were reviewed and updated as appropriate: allergies, current medications, past family history, past medical history, past social history, past surgical history and problem list     Review of Systems   Gastrointestinal: Negative for abdominal pain, nausea and vomiting         + GERD   Psychiatric/Behavioral: The patient is nervous/anxious           Frustrated with her lack of  Weight loss       Objective:    /80 (BP Location: Left arm, Patient Position: Sitting, Cuff Size: Large)   Pulse 95   Temp 98 4 °F (36 9 °C) (Tympanic)   Resp 14   Ht 5' 1" (1 549 m)   Wt 83 6 kg (184 lb 3 2 oz)   BMI 34 80 kg/m²      Physical Exam   Constitutional: She is oriented to person, place, and time  She appears well-developed  HENT:   Head: Normocephalic  Pulmonary/Chest: Effort normal and breath sounds normal    Neurological: She is alert and oriented to person, place, and time  Psychiatric: She has a normal mood and affect  Her behavior is normal  Thought content normal    Nursing note and vitals reviewed

## 2019-08-06 DIAGNOSIS — F41.8 ANXIETY WITH DEPRESSION: ICD-10-CM

## 2019-08-07 RX ORDER — ALPRAZOLAM 0.5 MG/1
0.5 TABLET ORAL
Qty: 10 TABLET | Refills: 0 | Status: SHIPPED | OUTPATIENT
Start: 2019-08-07 | End: 2020-01-08

## 2019-08-07 RX ORDER — BUPROPION HYDROCHLORIDE 150 MG/1
150 TABLET ORAL DAILY
Qty: 30 TABLET | Refills: 0 | Status: SHIPPED | OUTPATIENT
Start: 2019-08-07 | End: 2020-01-08

## 2019-09-07 ENCOUNTER — APPOINTMENT (OUTPATIENT)
Dept: LAB | Facility: CLINIC | Age: 34
End: 2019-09-07
Payer: COMMERCIAL

## 2019-09-07 DIAGNOSIS — R63.5 ABNORMAL WEIGHT GAIN: ICD-10-CM

## 2019-09-07 DIAGNOSIS — E66.9 CLASS 1 OBESITY: ICD-10-CM

## 2019-09-07 LAB
CORTIS AM PEAK SERPL-MCNC: 22 UG/DL (ref 4.2–22.4)
PROLACTIN SERPL-MCNC: 27.5 NG/ML

## 2019-09-07 PROCEDURE — 84146 ASSAY OF PROLACTIN: CPT

## 2019-09-07 PROCEDURE — 36415 COLL VENOUS BLD VENIPUNCTURE: CPT

## 2019-09-07 PROCEDURE — 82533 TOTAL CORTISOL: CPT

## 2019-09-09 ENCOUNTER — TELEPHONE (OUTPATIENT)
Dept: BARIATRICS | Facility: CLINIC | Age: 34
End: 2019-09-09

## 2019-09-09 NOTE — TELEPHONE ENCOUNTER
Spoke with robert who wants patient to get REE done first and work off the recommended calorie range from the REE before discussing meds  I know the REE machine is down for now  Patient can come in and get weighed and we can see how her goals are going  Or she can reschedule for two weeks when we should have a working REE machine to get that done and than have a visit with me to go over  I can give her the med list at this appt to call insurance and see what will be covered but we can't prescribe anything till she even talks to insurance

## 2019-09-09 NOTE — TELEPHONE ENCOUNTER
----- Message from Geraldo Harris PA-C sent at 9/9/2019  9:23 AM EDT -----  Please call patient and inform her that her am cortisol and prolactin are both within normal range   No cause seen to be affecting weight

## 2019-09-09 NOTE — TELEPHONE ENCOUNTER
Called and spoke to patient  Advised of below  Pt will reschedule with Bibi once she has the REE completed

## 2019-09-25 ENCOUNTER — TELEPHONE (OUTPATIENT)
Dept: BARIATRICS | Facility: CLINIC | Age: 34
End: 2019-09-25

## 2019-09-25 NOTE — TELEPHONE ENCOUNTER
Called pt back she is very upset that she has to wait this long she did not want to come in for 2 separate appts and wished to have both REE and F-Up scheduled on the same day  Providers did not have anything for the time frame she wanted for both providers until the end of Oct  She was upset that this is keeps getting pushed back  I tried to explain that the one provider is out and our covering PA is only in the office 2 days a week at this time  Pt stated I was being "Smart With her" and hung up on me   No appt has been scheduled

## 2019-09-25 NOTE — TELEPHONE ENCOUNTER
[facility] late next week  She would not start scheduling patients to use it until 10/7  since I will be out on vacation until 10/7 we can schedule this patient for an REE as robert melendez recommended on that date and see what it comes back  If I have any availability that day I would be happy to see her for an appt otherwise you can offer her next available

## 2019-09-25 NOTE — TELEPHONE ENCOUNTER
Patricia Blackmon wanted this patient to have an REE completed prior to her getting a Rx for weight loss medication  However the REE machine here in [place] is still out of order  Pt called to ask if she can get the Rx without having the REE at this time since she has been waiting over 2 months she was in last to see robert on 7/25/19 we had to cx 2 appts due to the machine not working   Her CB # [id number]

## 2019-11-22 ENCOUNTER — OFFICE VISIT (OUTPATIENT)
Dept: BARIATRICS | Facility: CLINIC | Age: 34
End: 2019-11-22
Payer: COMMERCIAL

## 2019-11-22 VITALS — BODY MASS INDEX: 33.76 KG/M2 | HEIGHT: 61 IN | WEIGHT: 178.8 LBS

## 2019-11-22 DIAGNOSIS — K21.9 GASTROESOPHAGEAL REFLUX DISEASE, ESOPHAGITIS PRESENCE NOT SPECIFIED: ICD-10-CM

## 2019-11-22 DIAGNOSIS — R63.5 ABNORMAL WEIGHT GAIN: Primary | ICD-10-CM

## 2019-11-22 DIAGNOSIS — E66.9 CLASS 1 OBESITY: ICD-10-CM

## 2019-11-22 PROCEDURE — 94690 O2 UPTK REST INDIRECT: CPT

## 2019-11-22 PROCEDURE — RECHECK

## 2019-11-22 NOTE — PROGRESS NOTES
Ht:  61"    Current wt:  178 8  BMI:  33 8  Net weight loss:  -2 8lbs    Pt seen today for REE measurement  Pt's REE results show to be slightly slower than the average metabolism for someone her height, weight and age, but almost close to normal   Reviewed results with pt and advised in order to continue with weight loss would suggest that she consume between 3052-4189 calories per day, and aim for 57-72gm of protein per day  Dicussed typical day of food intake to include:  B-smoothie with almond milk, spinach, about 1 cup of fruit  S-greek yogurt  L-salad sometimes with tuna pack  D-another salad with shrimp and balsamic vinaigrette  Snacks:  Sugar free candies  Discussed better meeting protein needs  Provided with sample of Premier and Ensure protein shake to have in the morning within 1-1 5hrs of waking, did some menu planning to better help her meet her protein needs and provided with a snack list for ideas  Also, provided pt with list of Medical Weight Management programs to review before her visit with the PA on Monday, November 25th

## 2019-11-25 ENCOUNTER — TELEPHONE (OUTPATIENT)
Dept: BARIATRICS | Facility: CLINIC | Age: 34
End: 2019-11-25

## 2019-11-25 ENCOUNTER — OFFICE VISIT (OUTPATIENT)
Dept: BARIATRICS | Facility: CLINIC | Age: 34
End: 2019-11-25
Payer: COMMERCIAL

## 2019-11-25 VITALS
WEIGHT: 177.8 LBS | RESPIRATION RATE: 16 BRPM | HEIGHT: 61 IN | HEART RATE: 75 BPM | DIASTOLIC BLOOD PRESSURE: 64 MMHG | BODY MASS INDEX: 33.57 KG/M2 | SYSTOLIC BLOOD PRESSURE: 110 MMHG | TEMPERATURE: 97.5 F

## 2019-11-25 DIAGNOSIS — E66.9 CLASS 1 OBESITY: Primary | ICD-10-CM

## 2019-11-25 DIAGNOSIS — Z79.899 MEDICATION MANAGEMENT: ICD-10-CM

## 2019-11-25 DIAGNOSIS — F41.8 ANXIETY WITH DEPRESSION: ICD-10-CM

## 2019-11-25 PROCEDURE — 99213 OFFICE O/P EST LOW 20 MIN: CPT | Performed by: PHYSICIAN ASSISTANT

## 2019-11-25 NOTE — PROGRESS NOTES
Assessment/Plan:    Class 1 obesity  -Patient is pursuing Conservative Program  -Initial weight loss goal of 5-10% weight loss for improved health    Initial: 181 6 lbs  Current: 177 8 lbs  Change: -3 8 lbs    Goals:  Food log (ie ) www myfitnesspal com,sparkpeople  com,loseit com,calorieking  com,etc    No sugary beverages  At least 64oz of water daily  --32 oz for next visit and restrict apple juice   Increase physical activity by 10 minutes daily  Gradually increase physical activity to a goal of 5 days per week for 30 minutes of MODERATE intensity PLUS 2 days per week of FULL BODY resistance training--keep up the great work   2280-0897 calories per day  5-10 servings of fruits and vegetables per day   57-72gm of protein per day  ekg ordered       Anxiety with depression  Not taking any medication  Feels controlled         Follow up in approximately 2 months with Non-Surgical Physician/Advanced Practitioner  Diagnoses and all orders for this visit:    Class 1 obesity  -     ECG 12 lead; Future    Anxiety with depression  -     ECG 12 lead; Future    Medication management  -     ECG 12 lead; Future          Subjective:   Chief Complaint   Patient presents with    Follow-up     pt is here for [facility] follow up  Patient ID: Yaakov Elkins  is a 27 y o  female with excess weight/obesity here to pursue weight managment  Patient is pursuing Conservative Program      HPI    Food logging: yes logging and staying around 1000 calories   Increased appetite/cravings:denies   Fruit/Vegetable servings: 4-5 servings   Exercise: 30 biking and walk for 30 minutes daily   Hydration: 18 oz of water, also drinks it with crystal light, SF apple juice, gatorade- no calorie     Patient requesting to start medication  She notes Donna Berg PA-C told her she had to get the REE done first and than she could start a medication and she did REE last week   Patient claims she is food logging (although could not produce a food log today noting her daughter "moved stuff all around" she also notes she is measuring all portions  Patient has not called insurance to check medication coverage  Notes she had previously discussed saxenda with robert  Recommended she call and check coverage and make sure she can afford the cost  Also discussed phentermine and ordered ekg if saxenda is not an option for her  Has tried wellbutrin in the past with no results  Also reviewed naltrexone and topamax  Patient denies cardiac history  Also denies personal and family history of MCT and MEN2 tumors  Patient denies personal history of pancreatitis  Patient will call insurance and will call if she wants to start saxenda  otherwise will go get ekg done and let us know when that is complete  B: smoothie with greek yogurt and spinach with strawberry and bananas and almond milk- measured   S: greek yogurt  L:soup (can be creamy or broth based) and salad with grilled chicken with avocado and onions and radishes and cheese   S:protien bar or fruit or chips or pretzels-100 calorie pack   D: protein with brown rice and veggie -measuring starch keeping it at 1/4 cup   S: occasionally a protein bar      Colonoscopy-Not applicable    The following portions of the patient's history were reviewed and updated as appropriate: allergies, current medications, past family history, past medical history, past social history, past surgical history and problem list     Review of Systems   HENT: Negative for sore throat  Respiratory: Negative for cough and shortness of breath  Cardiovascular: Negative for chest pain and palpitations  Gastrointestinal: Negative for abdominal pain, constipation, diarrhea, nausea and vomiting  Denies GERD   Skin: Negative for rash  Psychiatric/Behavioral: Negative for suicidal ideas (denies HI)          Denies depression and anxiety       Objective:    /64 (BP Location: Left arm, Patient Position: Sitting, Cuff Size: Large)   Pulse 75   Temp 97 5 °F (36 4 °C) (Tympanic)   Resp 16   Ht 5' 1" (1 549 m)   Wt 80 6 kg (177 lb 12 8 oz)   BMI 33 60 kg/m²      Physical Exam   Nursing note and vitals reviewed  Constitutional   General appearance: Abnormal   well developed and obese  Eyes No conjunctival pallor  Ears, Nose, Mouth, and Throat Oral mucosa moist    Pulmonary   Respiratory effort: No increased work of breathing or signs of respiratory distress  Auscultation of lungs: Clear to auscultation, equal breath sounds bilaterally, no wheezes, no rales, no rhonci  Cardiovascular   Auscultation of heart: Normal rate and rhythm, normal S1 and S2, without murmurs  Examination of extremities for edema and/or varicosities: Normal   no edema  Abdomen   Abdomen: Abnormal   The abdomen was obese  Bowel sounds were normal  The abdomen was soft and nontender     Musculoskeletal   Gait and station: Normal     Psychiatric   Orientation to person, place and time: Normal     Affect: appropriate

## 2019-11-25 NOTE — TELEPHONE ENCOUNTER
Patient called and stated she called her insurance regarding the [address] [facility] and It would cost her $300 00 a month  Pt is not able to afford that  Pt stated the insurance would cover Victoza? She wanted to know your thoughts on that med? Would it work the same?     CB# [phone]

## 2019-11-25 NOTE — ASSESSMENT & PLAN NOTE
-Patient is pursuing Conservative Program  -Initial weight loss goal of 5-10% weight loss for improved health    Initial: 181 6 lbs  Current: 177 8 lbs  Change: -3 8 lbs    Goals:  Food log (ie ) www myfitnesspal com,sparkpeople  com,loseit com,calorieking  com,etc    No sugary beverages  At least 64oz of water daily  --32 oz for next visit and restrict apple juice   Increase physical activity by 10 minutes daily   Gradually increase physical activity to a goal of 5 days per week for 30 minutes of MODERATE intensity PLUS 2 days per week of FULL BODY resistance training--keep up the great work   9893-7364 calories per day  5-10 servings of fruits and vegetables per day   57-72gm of protein per day  ekg ordered

## 2019-11-25 NOTE — TELEPHONE ENCOUNTER
I don't typically use victoza in place of saxenda   Victoza is for diabetes and without a diagnosis of this I don't think insurance will cover it

## 2019-11-25 NOTE — PATIENT INSTRUCTIONS
Goals: Food log (ie ) www myfitnesspal com,sparkpeople  com,loseit com,calorieking  com,etc    No sugary beverages  At least 64oz of water daily  --32 oz for next visit and restrict apple juice   Increase physical activity by 10 minutes daily   Gradually increase physical activity to a goal of 5 days per week for 30 minutes of MODERATE intensity PLUS 2 days per week of FULL BODY resistance training--keep up the great work   9480-5145 calories per day  5-10 servings of fruits and vegetables per day   57-72gm of protein per day

## 2019-11-26 ENCOUNTER — TRANSCRIBE ORDERS (OUTPATIENT)
Dept: LAB | Facility: CLINIC | Age: 34
End: 2019-11-26

## 2019-11-26 ENCOUNTER — OFFICE VISIT (OUTPATIENT)
Dept: LAB | Facility: CLINIC | Age: 34
End: 2019-11-26
Payer: COMMERCIAL

## 2019-11-26 DIAGNOSIS — E66.9 CLASS 1 OBESITY: ICD-10-CM

## 2019-11-26 DIAGNOSIS — F41.8 ANXIETY WITH DEPRESSION: ICD-10-CM

## 2019-11-26 DIAGNOSIS — Z79.899 MEDICATION MANAGEMENT: ICD-10-CM

## 2019-11-26 LAB
ATRIAL RATE: 90 BPM
P AXIS: 37 DEGREES
PR INTERVAL: 154 MS
QRS AXIS: 0 DEGREES
QRSD INTERVAL: 74 MS
QT INTERVAL: 356 MS
QTC INTERVAL: 435 MS
T WAVE AXIS: 26 DEGREES
VENTRICULAR RATE: 90 BPM

## 2019-11-26 PROCEDURE — 93005 ELECTROCARDIOGRAM TRACING: CPT

## 2019-11-26 PROCEDURE — 93010 ELECTROCARDIOGRAM REPORT: CPT | Performed by: INTERNAL MEDICINE

## 2019-11-29 ENCOUNTER — TELEPHONE (OUTPATIENT)
Dept: BARIATRICS | Facility: CLINIC | Age: 34
End: 2019-11-29

## 2019-11-29 NOTE — TELEPHONE ENCOUNTER
Patient called the office stating she had EKG done on 11/26/19  Patient will like to discuss results with Bibi, she can be reached at [phone]  Will forward to Bibi for further review

## 2019-12-02 NOTE — TELEPHONE ENCOUNTER
Georges Jenkins just in case this patient calls your office  I gave her asif's number but I don't know if she will call me or you back  Her EKG is in the chart shows: Normal sinus rhythm  Possible Left atrial enlargement  Septal infarct , age undetermined  No previous ECGs available    My plan for her is to see cardiology and get them to clear her for the medication prior to starting it  OR she can consider another med but would still tell her she needs to see cardiology   Thanks

## 2019-12-03 ENCOUNTER — CONSULT (OUTPATIENT)
Dept: CARDIOLOGY CLINIC | Facility: CLINIC | Age: 34
End: 2019-12-03
Payer: COMMERCIAL

## 2019-12-03 VITALS
HEART RATE: 70 BPM | BODY MASS INDEX: 33.99 KG/M2 | DIASTOLIC BLOOD PRESSURE: 70 MMHG | WEIGHT: 180 LBS | SYSTOLIC BLOOD PRESSURE: 110 MMHG | OXYGEN SATURATION: 97 % | HEIGHT: 61 IN

## 2019-12-03 DIAGNOSIS — R00.2 PALPITATIONS: ICD-10-CM

## 2019-12-03 DIAGNOSIS — F17.200 CURRENT EVERY DAY SMOKER: ICD-10-CM

## 2019-12-03 DIAGNOSIS — R94.31 ABNORMAL ECG: ICD-10-CM

## 2019-12-03 DIAGNOSIS — R06.02 SHORTNESS OF BREATH: Primary | ICD-10-CM

## 2019-12-03 PROCEDURE — 99244 OFF/OP CNSLTJ NEW/EST MOD 40: CPT | Performed by: INTERNAL MEDICINE

## 2019-12-03 RX ORDER — VARENICLINE TARTRATE 25 MG
KIT ORAL
Qty: 53 TABLET | Refills: 0 | Status: SHIPPED | OUTPATIENT
Start: 2019-12-03 | End: 2020-02-06 | Stop reason: SDUPTHER

## 2019-12-03 NOTE — PROGRESS NOTES
Subjective:     Halina Soulier is a 27 y o  female  who presents to the office today for a preoperative consultation at the request of surgeon *** who plans on performing *** on {month:} {:61714}  Planned anesthesia is {anesthesia type:812}  The patient has no known anesthesia issues  Most recent surgery was ***   she is able to ambulate 4 blocks on level ground or 2 flights of stairs without stopping   (>4 METs)      {Common ambulatory SmartLinks:55785}    Review of Systems  Review of Systems       Objective:      Physical Exam  Ht 5' 1" (1 549 m)   BMI 33 60 kg/m²    Physical Exam     Cardiographics  ECG: {findings; ec}  Echocardiogram: {findings; echo:92028}    Imaging  Chest x-ray: {findings; cxr:14479}     Lab Review   {recent LDKW:48809::"NLK applicable"}       Assessment:     No diagnosis found  57 y o  female  with planned surgery as above  Known risk factors for perioperative complications: {risk EYVAQXC:14828::"VSZD"}        Cardiac Risk Estimation: per the Revised Cardiac Risk Index, the patient has a score of {0 placing her at low risk of major cardiac event (3 9%)}, {1 placing her at low-intermediate risk of major cardiac event (6%)} and may proceed to OR as planned  No further cardiac workup is indicated at this time  Plan:      1  Preoperative workup as follows {workup:18892}  2  Change in medication regimen before surgery: {preop meds:16468}  3  Prophylaxis for cardiac events with perioperative beta-blockers: {not indicated/consider:31970}  {Patient is currently on beta blocker therapy, which she should continue on morning of procedure}  4  Invasive hemodynamic monitoring perioperatively: {not indicated/strongly advise:96180}  5  Deep vein thrombosis prophylaxis postoperatively:{not indicated/consider:08903}    6  Surveillance for postoperative MI with ECG immediately postoperatively and on postoperative days 1 and 2 AND troponin levels 24 hours postoperatively and on day 4 or hospital discharge (whichever comes first): {not indicated/strongly advise:62403}  {preoperative pBNP  If the NT-ProBNP is ?300 ng/L, then there should be an EKG ordered in the PACU and troponins should be measured daily for 48-72 hours  If NT-ProBNP is <300 ng/L, no routine postoperative cardiac monitoring is warranted}    7  Other measures: {preoperative recommendations:52919}

## 2019-12-03 NOTE — PROGRESS NOTES
[facility]  [facility] [address]   [place], [address]    Cardiology Consultation    Laura Mckeon  [id number]  1985      Consult for: Abnormal ECG/palpitations  Appreciate consult by: Valente Gonzalez DO    1  Shortness of breath  Echo complete with contrast if indicated   2  Current every day smoker  varenicline (CHANTIX AYDE) 0 5 MG X 11 & 1 MG X 42 tablet   3  Abnormal ECG        Discussion/Summary:     - Ms Montilla's ECG was reviewed with her  She has no findings to suggest major structural heart disease   - Will obtain 2D echocardiogram for further evaluation  - Discussed smoking cessation at length  Options for cessation reviewed with her including Chantix, Wellbutrin and nicotine replacement therapy  Will begin Chantix  Instructions for use discussed with her in detail  Total time spent counseling on nicotine cessation was 15 minutes  - based on results of echocardiogram, consider testing for sleep apnea or Holter monitor  HPI:     Ms Jonnathan Heredia is a 27year old female here for evaluation of an abnormal ECG  ECG was ordered by bariatric center because of palpitations and desire to take Saxenda medication  She has a large amount of stress at home with 2 children that are autistic  While at home, she feels anxiety with chest tightness, palpitations and shortness of breath  She does not feel this way outside the home and while exercising  Symptoms have improved with breathing exercises  She continues to smoke 1/2 ppd  Previously used Wellbutrin but did not stop  ECG showed Septal q waves and possible Left atrial enlargement       Past Medical History:   Diagnosis Date    Acid reflux     Allergic     seasonal    Anxiety     Blurred vision     Fainting     Headache     Loss of appetite     Obesity     Spontaneous vaginal delivery     x3    Weakness      Social History     Socioeconomic History    Marital status: /Civil Union     Spouse name: Not on file    Number of children: Not on file    Years of education: Not on file    Highest education level: Not on file   Occupational History    Occupation: Housewife or [address] resource strain: Not on file    Food insecurity:     Worry: Not on file     Inability: Not on file    Transportation needs:     Medical: Not on file     Non-medical: Not on file   Tobacco Use    Smoking status: Current Every Day Smoker     Packs/day: 0 25     Types: Cigarettes    Smokeless tobacco: Never Used    Tobacco comment: currently 3cigs/day   Substance and Sexual Activity    Alcohol use: Not Currently     Frequency: Monthly or less     Comment: monthly    Drug use: No    Sexual activity: Yes     Partners: Male   Lifestyle    Physical activity:     Days per week: Not on file     Minutes per session: Not on file    Stress: Not on file   Relationships    Social connections:     Talks on phone: Not on file     Gets together: Not on file     Attends Spiritism service: Not on file     Active member of club or organization: Not on file     Attends meetings of clubs or organizations: Not on file     Relationship status: Not on file    Intimate partner violence:     Fear of current or ex partner: Not on file     Emotionally abused: Not on file     Physically abused: Not on file     Forced sexual activity: Not on file   Other Topics Concern    Not on file   Social History Narrative    Always uses seat belt    Caffeine use    [employer] (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 Almanza)      Family History   Problem Relation Age of Onset    Hypertension Mother     Breast cancer Maternal Grandmother     Diabetes Maternal Grandmother     Thyroid disease Other     Autism Other     Anemia Father     Leukemia Sister     Diabetes Paternal Grandmother     Fibroids Sister     No Known Problems Brother     No Known Problems Sister     No Known Problems Sister     Colon cancer Neg Hx     Cervical cancer Neg Hx     Stroke Neg Hx      Past Surgical History:   Procedure Laterality Date    NO PAST SURGERIES         Current Outpatient Medications:     ALPRAZolam (XANAX) 0 5 mg tablet, Take 1 tablet (0 5 mg total) by mouth daily at bedtime as needed for anxiety (Patient not taking: Reported on 11/25/2019), Disp: 10 tablet, Rfl: 0    buPROPion (WELLBUTRIN XL) 150 mg 24 hr tablet, Take 1 tablet (150 mg total) by mouth daily (Patient not taking: Reported on 11/25/2019), Disp: 30 tablet, Rfl: 0    varenicline (CHANTIX AYDE) 0 5 MG X 11 & 1 MG X 42 tablet, Take one 0 5mg tab by mouth 1x daily for 3 days, then increase to one 0 5mg tab 2x daily for 3 days, then increase to one 1mg tab 2x daily, Disp: 53 tablet, Rfl: 0  Allergies   Allergen Reactions    Other Itching     apples       Review of Systems:   Review of Systems   Constitutional: Positive for fatigue  Negative for chills and fever  HENT: Negative for congestion, nosebleeds and postnasal drip  Respiratory: Positive for chest tightness and shortness of breath  Negative for cough  Cardiovascular: Positive for palpitations  Negative for chest pain and leg swelling  Gastrointestinal: Negative for abdominal distention, abdominal pain, diarrhea, nausea and vomiting  Endocrine: Negative for polydipsia, polyphagia and polyuria  Musculoskeletal: Positive for arthralgias  Negative for gait problem and myalgias  Skin: Negative for color change, pallor and rash  Allergic/Immunologic: Negative for environmental allergies, food allergies and immunocompromised state  Neurological: Positive for light-headedness  Negative for dizziness, seizures and syncope  Hematological: Negative for adenopathy  Does not bruise/bleed easily  Psychiatric/Behavioral: Negative for dysphoric mood  The patient is not nervous/anxious          Physical Examination:     Vitals:    12/03/19 1155   BP: 110/70   BP Location: Left arm   Patient Position: Sitting   Cuff Size: Standard Pulse: 70   SpO2: 97%   Weight: 81 6 kg (180 lb)   Height: 5' 1" (1 549 m)       Physical Exam   Constitutional: She is oriented to person, place, and time  She appears well-developed  No distress  HENT:   Head: Normocephalic and atraumatic  Eyes: Pupils are equal, round, and reactive to light  Conjunctivae and EOM are normal    Neck: Neck supple  No JVD present  No thyromegaly present  Cardiovascular: Normal rate, regular rhythm and normal heart sounds  Exam reveals no gallop and no friction rub  No murmur heard  Pulmonary/Chest: Effort normal and breath sounds normal    Abdominal: Soft  She exhibits no distension  There is no tenderness  Musculoskeletal: She exhibits no edema  Neurological: She is alert and oriented to person, place, and time  No cranial nerve deficit  Skin: Skin is warm and dry  No rash noted  She is not diaphoretic  No erythema  Psychiatric: She has a normal mood and affect   Her behavior is normal  Judgment and thought content normal        Labs:     Lab Results   Component Value Date    WBC 8 21 05/24/2018    HGB 12 8 05/24/2018    HCT 40 1 05/24/2018    MCV 93 05/24/2018    RDW 13 8 05/24/2018     05/24/2018     BMP:  Lab Results   Component Value Date    SODIUM 139 06/25/2019    K 3 7 06/25/2019     06/25/2019    CO2 28 06/25/2019    BUN 7 06/25/2019    CREATININE 0 96 06/25/2019    GLUC 91 06/25/2019    GLUF 88 05/24/2018    CALCIUM 9 7 06/25/2019    EGFR 89 06/25/2019     LFT:  Lab Results   Component Value Date    AST 40 06/25/2019    ALT 44 06/25/2019    ALKPHOS 214 (H) 06/25/2019    TP 8 8 (H) 06/25/2019    ALB 3 7 06/25/2019      Lab Results   Component Value Date    EHI9EJYVIKQO 1 798 06/25/2019     No results found for: HGBA1C  Lipid Profile:   Lab Results   Component Value Date    CHOLESTEROL 204 (H) 01/31/2018    HDL 75 (H) 03/25/2016    LDLCALC 109 (H) 03/25/2016    TRIG 105 03/25/2016     Lab Results   Component Value Date    CHOLESTEROL 204 (H) 01/31/2018    CHOLESTEROL 205 (H) 03/25/2016     No results found for: CKTOTAL, CKMB, CKMBINDEX, TROPONINI  No results found for: NTBNP   Recent Results (from the past 672 hour(s))   ECG 12 lead    Collection Time: 11/26/19  9:06 AM   Result Value Ref Range    Ventricular Rate 90 BPM    Atrial Rate 90 BPM    GA Interval 154 ms    QRSD Interval 74 ms    QT Interval 356 ms    QTC Interval 435 ms    P Axis 37 degrees    QRS Axis 0 degrees    T Wave [place] 26 degrees       Imaging & Testing   I have personally reviewed pertinent reports  Cardiac Testing   No results found for this or any previous visit  EKG: Personally reviewed  NSR with no ST abnormalities      To Campbell DO, [place]  [phone]  Please call with any questions

## 2019-12-19 ENCOUNTER — HOSPITAL ENCOUNTER (OUTPATIENT)
Dept: NON INVASIVE DIAGNOSTICS | Facility: HOSPITAL | Age: 34
Discharge: HOME | End: 2019-12-19
Attending: INTERNAL MEDICINE
Payer: COMMERCIAL

## 2019-12-19 ENCOUNTER — TELEPHONE (OUTPATIENT)
Dept: CARDIOLOGY CLINIC | Facility: CLINIC | Age: 34
End: 2019-12-19

## 2019-12-19 DIAGNOSIS — R06.02 SHORTNESS OF BREATH: ICD-10-CM

## 2019-12-19 PROCEDURE — 93306 TTE W/DOPPLER COMPLETE: CPT | Performed by: INTERNAL MEDICINE

## 2019-12-19 PROCEDURE — 93306 TTE W/DOPPLER COMPLETE: CPT

## 2019-12-19 NOTE — TELEPHONE ENCOUNTER
----- Message from Eden Roque DO sent at 12/19/2019  3:54 PM EST -----  Can you please let the patient know echo was normal   She can start Suxenda medication

## 2019-12-23 NOTE — TELEPHONE ENCOUNTER
Michael Oliva,    The cardiology eval was for clearance of Phentermine, not Saxenda  IS the patient cleared to start phentermine?  Thank you    Billy Jin PA-C

## 2019-12-23 NOTE — TELEPHONE ENCOUNTER
Patient called the office stating she had ECG done 12/19/19 and was cleared by cardiologist  Patient is requesting for Rx sent to [address]  Patient can be reached at [phone]  Will forward to Alma Rosa Wu PA-C and Bibi Pires PA-C for further review

## 2019-12-24 NOTE — TELEPHONE ENCOUNTER
Called and spoke to patient  Waiting for clearance of Phentermine from cardiologist, velasquez Haas   Will follow up with patient once clearance obtained

## 2019-12-30 DIAGNOSIS — E66.9 CLASS 1 OBESITY: Primary | ICD-10-CM

## 2019-12-30 RX ORDER — PHENTERMINE HYDROCHLORIDE 37.5 MG/1
TABLET ORAL
Qty: 15 TABLET | Refills: 1 | Status: SHIPPED | OUTPATIENT
Start: 2019-12-30 | End: 2020-04-22 | Stop reason: SDUPTHER

## 2020-01-04 ENCOUNTER — ANESTHESIA (OUTPATIENT)
Dept: PERIOP | Facility: HOSPITAL | Age: 35
End: 2020-01-04
Payer: COMMERCIAL

## 2020-01-04 ENCOUNTER — APPOINTMENT (OUTPATIENT)
Dept: RADIOLOGY | Facility: HOSPITAL | Age: 35
End: 2020-01-04
Payer: COMMERCIAL

## 2020-01-04 ENCOUNTER — ANESTHESIA EVENT (OUTPATIENT)
Dept: PERIOP | Facility: HOSPITAL | Age: 35
End: 2020-01-04
Payer: COMMERCIAL

## 2020-01-04 ENCOUNTER — HOSPITAL ENCOUNTER (OUTPATIENT)
Facility: HOSPITAL | Age: 35
Setting detail: OUTPATIENT SURGERY
Discharge: HOME | End: 2020-01-04
Attending: EMERGENCY MEDICINE | Admitting: INTERNAL MEDICINE
Payer: COMMERCIAL

## 2020-01-04 ENCOUNTER — APPOINTMENT (EMERGENCY)
Dept: CT IMAGING | Facility: HOSPITAL | Age: 35
End: 2020-01-04
Payer: COMMERCIAL

## 2020-01-04 ENCOUNTER — PREP FOR PROCEDURE (OUTPATIENT)
Dept: UROLOGY | Facility: CLINIC | Age: 35
End: 2020-01-04

## 2020-01-04 VITALS
WEIGHT: 181.66 LBS | BODY MASS INDEX: 34.32 KG/M2 | RESPIRATION RATE: 14 BRPM | DIASTOLIC BLOOD PRESSURE: 58 MMHG | SYSTOLIC BLOOD PRESSURE: 98 MMHG | TEMPERATURE: 97.9 F | HEART RATE: 74 BPM | OXYGEN SATURATION: 99 %

## 2020-01-04 DIAGNOSIS — N20.0 KIDNEY STONE ON LEFT SIDE: ICD-10-CM

## 2020-01-04 DIAGNOSIS — F41.8 ANXIETY WITH DEPRESSION: ICD-10-CM

## 2020-01-04 DIAGNOSIS — N20.1 LEFT URETERAL STONE: Primary | ICD-10-CM

## 2020-01-04 DIAGNOSIS — N20.1 URETERAL CALCULUS, LEFT: Primary | ICD-10-CM

## 2020-01-04 DIAGNOSIS — R59.1 LYMPHADENOPATHY: ICD-10-CM

## 2020-01-04 DIAGNOSIS — Q62.11 HYDRONEPHROSIS WITH URETEROPELVIC JUNCTION (UPJ) OBSTRUCTION: ICD-10-CM

## 2020-01-04 PROBLEM — R11.0 NAUSEA: Status: RESOLVED | Noted: 2018-05-24 | Resolved: 2020-01-04

## 2020-01-04 LAB
ALBUMIN SERPL BCP-MCNC: 3.5 G/DL (ref 3.5–5)
ALP SERPL-CCNC: 294 U/L (ref 46–116)
ALT SERPL W P-5'-P-CCNC: 62 U/L (ref 12–78)
ANION GAP SERPL CALCULATED.3IONS-SCNC: 12 MMOL/L (ref 4–13)
ANION GAP SERPL CALCULATED.3IONS-SCNC: 8 MMOL/L (ref 4–13)
ANION GAP SERPL CALCULATED.3IONS-SCNC: 9 MMOL/L (ref 4–13)
AST SERPL W P-5'-P-CCNC: 52 U/L (ref 5–45)
BACTERIA UR QL AUTO: ABNORMAL /HPF
BASOPHILS # BLD AUTO: 0.05 THOUSANDS/ΜL (ref 0–0.1)
BASOPHILS NFR BLD AUTO: 0 % (ref 0–1)
BILIRUB SERPL-MCNC: 0.85 MG/DL (ref 0.2–1)
BILIRUB UR QL STRIP: NEGATIVE
BUN SERPL-MCNC: 10 MG/DL (ref 5–25)
BUN SERPL-MCNC: 9 MG/DL (ref 5–25)
BUN SERPL-MCNC: 9 MG/DL (ref 5–25)
CALCIUM SERPL-MCNC: 10 MG/DL (ref 8.3–10.1)
CALCIUM SERPL-MCNC: 8.7 MG/DL (ref 8.3–10.1)
CALCIUM SERPL-MCNC: 8.9 MG/DL (ref 8.3–10.1)
CHLORIDE SERPL-SCNC: 105 MMOL/L (ref 100–108)
CHLORIDE SERPL-SCNC: 105 MMOL/L (ref 100–108)
CHLORIDE SERPL-SCNC: 99 MMOL/L (ref 100–108)
CLARITY UR: CLEAR
CO2 SERPL-SCNC: 23 MMOL/L (ref 21–32)
CO2 SERPL-SCNC: 25 MMOL/L (ref 21–32)
CO2 SERPL-SCNC: 26 MMOL/L (ref 21–32)
COLOR UR: YELLOW
CREAT SERPL-MCNC: 1.41 MG/DL (ref 0.6–1.3)
CREAT SERPL-MCNC: 1.44 MG/DL (ref 0.6–1.3)
CREAT SERPL-MCNC: 1.5 MG/DL (ref 0.6–1.3)
EOSINOPHIL # BLD AUTO: 0.09 THOUSAND/ΜL (ref 0–0.61)
EOSINOPHIL NFR BLD AUTO: 1 % (ref 0–6)
ERYTHROCYTE [DISTWIDTH] IN BLOOD BY AUTOMATED COUNT: 12.6 % (ref 11.6–15.1)
ERYTHROCYTE [DISTWIDTH] IN BLOOD BY AUTOMATED COUNT: 12.9 % (ref 11.6–15.1)
EXT PREG TEST URINE: NEGATIVE
EXT. CONTROL ED NAV: NORMAL
GFR SERPL CREATININE-BSD FRML MDRD: 52 ML/MIN/1.73SQ M
GFR SERPL CREATININE-BSD FRML MDRD: 54 ML/MIN/1.73SQ M
GFR SERPL CREATININE-BSD FRML MDRD: 56 ML/MIN/1.73SQ M
GLUCOSE P FAST SERPL-MCNC: 186 MG/DL (ref 65–99)
GLUCOSE SERPL-MCNC: 106 MG/DL (ref 65–140)
GLUCOSE SERPL-MCNC: 110 MG/DL (ref 65–140)
GLUCOSE SERPL-MCNC: 186 MG/DL (ref 65–140)
GLUCOSE UR STRIP-MCNC: NEGATIVE MG/DL
HCT VFR BLD AUTO: 36.1 % (ref 34.8–46.1)
HCT VFR BLD AUTO: 40.8 % (ref 34.8–46.1)
HGB BLD-MCNC: 11.9 G/DL (ref 11.5–15.4)
HGB BLD-MCNC: 13.9 G/DL (ref 11.5–15.4)
HGB UR QL STRIP.AUTO: ABNORMAL
IMM GRANULOCYTES # BLD AUTO: 0.04 THOUSAND/UL (ref 0–0.2)
IMM GRANULOCYTES NFR BLD AUTO: 0 % (ref 0–2)
KETONES UR STRIP-MCNC: ABNORMAL MG/DL
LEUKOCYTE ESTERASE UR QL STRIP: NEGATIVE
LIPASE SERPL-CCNC: 73 U/L (ref 73–393)
LYMPHOCYTES # BLD AUTO: 2.87 THOUSANDS/ΜL (ref 0.6–4.47)
LYMPHOCYTES NFR BLD AUTO: 24 % (ref 14–44)
MCH RBC QN AUTO: 30.2 PG (ref 26.8–34.3)
MCH RBC QN AUTO: 30.4 PG (ref 26.8–34.3)
MCHC RBC AUTO-ENTMCNC: 33 G/DL (ref 31.4–37.4)
MCHC RBC AUTO-ENTMCNC: 34.1 G/DL (ref 31.4–37.4)
MCV RBC AUTO: 89 FL (ref 82–98)
MCV RBC AUTO: 92 FL (ref 82–98)
MONOCYTES # BLD AUTO: 1.01 THOUSAND/ΜL (ref 0.17–1.22)
MONOCYTES NFR BLD AUTO: 8 % (ref 4–12)
MUCOUS THREADS UR QL AUTO: ABNORMAL
NEUTROPHILS # BLD AUTO: 8.16 THOUSANDS/ΜL (ref 1.85–7.62)
NEUTS SEG NFR BLD AUTO: 67 % (ref 43–75)
NITRITE UR QL STRIP: NEGATIVE
NON-SQ EPI CELLS URNS QL MICRO: ABNORMAL /HPF
NRBC BLD AUTO-RTO: 0 /100 WBCS
PH UR STRIP.AUTO: 5.5 [PH] (ref 4.5–8)
PLATELET # BLD AUTO: 348 THOUSANDS/UL (ref 149–390)
PLATELET # BLD AUTO: 434 THOUSANDS/UL (ref 149–390)
PMV BLD AUTO: 8.9 FL (ref 8.9–12.7)
PMV BLD AUTO: 9.1 FL (ref 8.9–12.7)
POTASSIUM SERPL-SCNC: 3.6 MMOL/L (ref 3.5–5.3)
POTASSIUM SERPL-SCNC: 3.8 MMOL/L (ref 3.5–5.3)
POTASSIUM SERPL-SCNC: 3.9 MMOL/L (ref 3.5–5.3)
PROT SERPL-MCNC: 9.1 G/DL (ref 6.4–8.2)
PROT UR STRIP-MCNC: NEGATIVE MG/DL
RBC # BLD AUTO: 3.92 MILLION/UL (ref 3.81–5.12)
RBC # BLD AUTO: 4.6 MILLION/UL (ref 3.81–5.12)
RBC #/AREA URNS AUTO: ABNORMAL /HPF
SODIUM SERPL-SCNC: 134 MMOL/L (ref 136–145)
SODIUM SERPL-SCNC: 139 MMOL/L (ref 136–145)
SODIUM SERPL-SCNC: 139 MMOL/L (ref 136–145)
SP GR UR STRIP.AUTO: 1.02 (ref 1–1.03)
UROBILINOGEN UR QL STRIP.AUTO: 1 E.U./DL
WBC # BLD AUTO: 12.22 THOUSAND/UL (ref 4.31–10.16)
WBC # BLD AUTO: 9.92 THOUSAND/UL (ref 4.31–10.16)
WBC #/AREA URNS AUTO: ABNORMAL /HPF

## 2020-01-04 PROCEDURE — 74176 CT ABD & PELVIS W/O CONTRAST: CPT

## 2020-01-04 PROCEDURE — 52332 CYSTOSCOPY AND TREATMENT: CPT | Performed by: UROLOGY

## 2020-01-04 PROCEDURE — 85025 COMPLETE CBC W/AUTO DIFF WBC: CPT

## 2020-01-04 PROCEDURE — 99236 HOSP IP/OBS SAME DATE HI 85: CPT | Performed by: INTERNAL MEDICINE

## 2020-01-04 PROCEDURE — C2617 STENT, NON-COR, TEM W/O DEL: HCPCS | Performed by: UROLOGY

## 2020-01-04 PROCEDURE — 99285 EMERGENCY DEPT VISIT HI MDM: CPT | Performed by: PHYSICIAN ASSISTANT

## 2020-01-04 PROCEDURE — 36415 COLL VENOUS BLD VENIPUNCTURE: CPT

## 2020-01-04 PROCEDURE — 80048 BASIC METABOLIC PNL TOTAL CA: CPT | Performed by: NURSE PRACTITIONER

## 2020-01-04 PROCEDURE — 99285 EMERGENCY DEPT VISIT HI MDM: CPT

## 2020-01-04 PROCEDURE — C1769 GUIDE WIRE: HCPCS | Performed by: UROLOGY

## 2020-01-04 PROCEDURE — 81001 URINALYSIS AUTO W/SCOPE: CPT

## 2020-01-04 PROCEDURE — 81025 URINE PREGNANCY TEST: CPT | Performed by: PHYSICIAN ASSISTANT

## 2020-01-04 PROCEDURE — 96374 THER/PROPH/DIAG INJ IV PUSH: CPT

## 2020-01-04 PROCEDURE — 85027 COMPLETE CBC AUTOMATED: CPT | Performed by: NURSE PRACTITIONER

## 2020-01-04 PROCEDURE — 96375 TX/PRO/DX INJ NEW DRUG ADDON: CPT

## 2020-01-04 PROCEDURE — 80053 COMPREHEN METABOLIC PANEL: CPT

## 2020-01-04 PROCEDURE — 99245 OFF/OP CONSLTJ NEW/EST HI 55: CPT | Performed by: UROLOGY

## 2020-01-04 PROCEDURE — 74420 UROGRAPHY RTRGR +-KUB: CPT

## 2020-01-04 PROCEDURE — 83690 ASSAY OF LIPASE: CPT

## 2020-01-04 PROCEDURE — 80048 BASIC METABOLIC PNL TOTAL CA: CPT | Performed by: UROLOGY

## 2020-01-04 RX ORDER — ONDANSETRON 2 MG/ML
4 INJECTION INTRAMUSCULAR; INTRAVENOUS ONCE AS NEEDED
Status: DISCONTINUED | OUTPATIENT
Start: 2020-01-04 | End: 2020-01-04 | Stop reason: HOSPADM

## 2020-01-04 RX ORDER — ALBUTEROL SULFATE 2.5 MG/3ML
2.5 SOLUTION RESPIRATORY (INHALATION) ONCE AS NEEDED
Status: DISCONTINUED | OUTPATIENT
Start: 2020-01-04 | End: 2020-01-04 | Stop reason: HOSPADM

## 2020-01-04 RX ORDER — KETOROLAC TROMETHAMINE 30 MG/ML
15 INJECTION, SOLUTION INTRAMUSCULAR; INTRAVENOUS ONCE
Status: COMPLETED | OUTPATIENT
Start: 2020-01-04 | End: 2020-01-04

## 2020-01-04 RX ORDER — OXYBUTYNIN CHLORIDE 5 MG/1
5 TABLET, EXTENDED RELEASE ORAL DAILY
Status: DISCONTINUED | OUTPATIENT
Start: 2020-01-04 | End: 2020-01-04 | Stop reason: HOSPADM

## 2020-01-04 RX ORDER — LIDOCAINE HYDROCHLORIDE 20 MG/ML
JELLY TOPICAL AS NEEDED
Status: DISCONTINUED | OUTPATIENT
Start: 2020-01-04 | End: 2020-01-04 | Stop reason: HOSPADM

## 2020-01-04 RX ORDER — MAGNESIUM HYDROXIDE 1200 MG/15ML
LIQUID ORAL AS NEEDED
Status: DISCONTINUED | OUTPATIENT
Start: 2020-01-04 | End: 2020-01-04 | Stop reason: HOSPADM

## 2020-01-04 RX ORDER — ONDANSETRON 2 MG/ML
4 INJECTION INTRAMUSCULAR; INTRAVENOUS EVERY 6 HOURS PRN
Status: DISCONTINUED | OUTPATIENT
Start: 2020-01-04 | End: 2020-01-04 | Stop reason: HOSPADM

## 2020-01-04 RX ORDER — HYDROMORPHONE HCL/PF 1 MG/ML
0.2 SYRINGE (ML) INJECTION EVERY 4 HOURS PRN
Status: DISCONTINUED | OUTPATIENT
Start: 2020-01-04 | End: 2020-01-04 | Stop reason: HOSPADM

## 2020-01-04 RX ORDER — ONDANSETRON 2 MG/ML
4 INJECTION INTRAMUSCULAR; INTRAVENOUS ONCE
Status: COMPLETED | OUTPATIENT
Start: 2020-01-04 | End: 2020-01-04

## 2020-01-04 RX ORDER — TAMSULOSIN HYDROCHLORIDE 0.4 MG/1
0.4 CAPSULE ORAL ONCE
Status: COMPLETED | OUTPATIENT
Start: 2020-01-04 | End: 2020-01-04

## 2020-01-04 RX ORDER — FENTANYL CITRATE 50 UG/ML
INJECTION, SOLUTION INTRAMUSCULAR; INTRAVENOUS AS NEEDED
Status: DISCONTINUED | OUTPATIENT
Start: 2020-01-04 | End: 2020-01-04 | Stop reason: SURG

## 2020-01-04 RX ORDER — HYDROMORPHONE HCL/PF 1 MG/ML
0.5 SYRINGE (ML) INJECTION
Status: DISCONTINUED | OUTPATIENT
Start: 2020-01-04 | End: 2020-01-04 | Stop reason: HOSPADM

## 2020-01-04 RX ORDER — PROPOFOL 10 MG/ML
INJECTION, EMULSION INTRAVENOUS AS NEEDED
Status: DISCONTINUED | OUTPATIENT
Start: 2020-01-04 | End: 2020-01-04 | Stop reason: SURG

## 2020-01-04 RX ORDER — ONDANSETRON 2 MG/ML
INJECTION INTRAMUSCULAR; INTRAVENOUS AS NEEDED
Status: DISCONTINUED | OUTPATIENT
Start: 2020-01-04 | End: 2020-01-04 | Stop reason: SURG

## 2020-01-04 RX ORDER — PHENAZOPYRIDINE HYDROCHLORIDE 100 MG/1
100 TABLET, FILM COATED ORAL
Status: DISCONTINUED | OUTPATIENT
Start: 2020-01-04 | End: 2020-01-04 | Stop reason: HOSPADM

## 2020-01-04 RX ORDER — CEFAZOLIN SODIUM 1 G/3ML
INJECTION, POWDER, FOR SOLUTION INTRAMUSCULAR; INTRAVENOUS AS NEEDED
Status: DISCONTINUED | OUTPATIENT
Start: 2020-01-04 | End: 2020-01-04 | Stop reason: SURG

## 2020-01-04 RX ORDER — SODIUM CHLORIDE, SODIUM LACTATE, POTASSIUM CHLORIDE, CALCIUM CHLORIDE 600; 310; 30; 20 MG/100ML; MG/100ML; MG/100ML; MG/100ML
125 INJECTION, SOLUTION INTRAVENOUS CONTINUOUS
Status: DISCONTINUED | OUTPATIENT
Start: 2020-01-04 | End: 2020-01-04 | Stop reason: HOSPADM

## 2020-01-04 RX ORDER — SODIUM CHLORIDE, SODIUM LACTATE, POTASSIUM CHLORIDE, CALCIUM CHLORIDE 600; 310; 30; 20 MG/100ML; MG/100ML; MG/100ML; MG/100ML
INJECTION, SOLUTION INTRAVENOUS CONTINUOUS PRN
Status: DISCONTINUED | OUTPATIENT
Start: 2020-01-04 | End: 2020-01-04 | Stop reason: SURG

## 2020-01-04 RX ORDER — OXYBUTYNIN CHLORIDE 5 MG/1
5 TABLET, EXTENDED RELEASE ORAL DAILY
Qty: 7 TABLET | Refills: 0 | Status: SHIPPED | OUTPATIENT
Start: 2020-01-05 | End: 2020-01-22 | Stop reason: HOSPADM

## 2020-01-04 RX ORDER — MIDAZOLAM HYDROCHLORIDE 2 MG/2ML
INJECTION, SOLUTION INTRAMUSCULAR; INTRAVENOUS AS NEEDED
Status: DISCONTINUED | OUTPATIENT
Start: 2020-01-04 | End: 2020-01-04 | Stop reason: SURG

## 2020-01-04 RX ORDER — FENTANYL CITRATE/PF 50 MCG/ML
25 SYRINGE (ML) INJECTION
Status: DISCONTINUED | OUTPATIENT
Start: 2020-01-04 | End: 2020-01-04 | Stop reason: HOSPADM

## 2020-01-04 RX ORDER — PROMETHAZINE HYDROCHLORIDE 25 MG/ML
12.5 INJECTION, SOLUTION INTRAMUSCULAR; INTRAVENOUS ONCE AS NEEDED
Status: DISCONTINUED | OUTPATIENT
Start: 2020-01-04 | End: 2020-01-04 | Stop reason: HOSPADM

## 2020-01-04 RX ORDER — DEXAMETHASONE SODIUM PHOSPHATE 10 MG/ML
INJECTION, SOLUTION INTRAMUSCULAR; INTRAVENOUS AS NEEDED
Status: DISCONTINUED | OUTPATIENT
Start: 2020-01-04 | End: 2020-01-04 | Stop reason: SURG

## 2020-01-04 RX ORDER — LABETALOL 20 MG/4 ML (5 MG/ML) INTRAVENOUS SYRINGE
5
Status: DISCONTINUED | OUTPATIENT
Start: 2020-01-04 | End: 2020-01-04 | Stop reason: HOSPADM

## 2020-01-04 RX ORDER — MEPERIDINE HYDROCHLORIDE 25 MG/ML
12.5 INJECTION INTRAMUSCULAR; INTRAVENOUS; SUBCUTANEOUS AS NEEDED
Status: DISCONTINUED | OUTPATIENT
Start: 2020-01-04 | End: 2020-01-04 | Stop reason: HOSPADM

## 2020-01-04 RX ORDER — PHENAZOPYRIDINE HYDROCHLORIDE 100 MG/1
100 TABLET, FILM COATED ORAL
Qty: 5 TABLET | Refills: 0 | Status: SHIPPED | OUTPATIENT
Start: 2020-01-04 | End: 2020-01-06

## 2020-01-04 RX ORDER — ACETAMINOPHEN 325 MG/1
650 TABLET ORAL EVERY 6 HOURS PRN
Status: DISCONTINUED | OUTPATIENT
Start: 2020-01-04 | End: 2020-01-04 | Stop reason: HOSPADM

## 2020-01-04 RX ORDER — CALCIUM CARBONATE 200(500)MG
1000 TABLET,CHEWABLE ORAL DAILY PRN
Status: DISCONTINUED | OUTPATIENT
Start: 2020-01-04 | End: 2020-01-04 | Stop reason: HOSPADM

## 2020-01-04 RX ORDER — SODIUM CHLORIDE 9 MG/ML
125 INJECTION, SOLUTION INTRAVENOUS CONTINUOUS
Status: DISCONTINUED | OUTPATIENT
Start: 2020-01-04 | End: 2020-01-04 | Stop reason: HOSPADM

## 2020-01-04 RX ORDER — LIDOCAINE HYDROCHLORIDE 10 MG/ML
INJECTION, SOLUTION EPIDURAL; INFILTRATION; INTRACAUDAL; PERINEURAL AS NEEDED
Status: DISCONTINUED | OUTPATIENT
Start: 2020-01-04 | End: 2020-01-04 | Stop reason: SURG

## 2020-01-04 RX ORDER — CEFAZOLIN SODIUM 2 G/50ML
2000 SOLUTION INTRAVENOUS ONCE
Status: CANCELLED | OUTPATIENT
Start: 2020-01-04 | End: 2020-01-04

## 2020-01-04 RX ORDER — OXYCODONE HYDROCHLORIDE AND ACETAMINOPHEN 5; 325 MG/1; MG/1
1 TABLET ORAL EVERY 4 HOURS PRN
Qty: 15 TABLET | Refills: 0 | Status: SHIPPED | OUTPATIENT
Start: 2020-01-04 | End: 2020-01-22 | Stop reason: HOSPADM

## 2020-01-04 RX ADMIN — SODIUM CHLORIDE 125 ML/HR: 0.9 INJECTION, SOLUTION INTRAVENOUS at 05:06

## 2020-01-04 RX ADMIN — TAMSULOSIN HYDROCHLORIDE 0.4 MG: 0.4 CAPSULE ORAL at 04:02

## 2020-01-04 RX ADMIN — ONDANSETRON 4 MG: 2 INJECTION INTRAMUSCULAR; INTRAVENOUS at 02:06

## 2020-01-04 RX ADMIN — PROPOFOL 200 MG: 10 INJECTION, EMULSION INTRAVENOUS at 09:10

## 2020-01-04 RX ADMIN — OXYBUTYNIN CHLORIDE 5 MG: 5 TABLET, EXTENDED RELEASE ORAL at 12:06

## 2020-01-04 RX ADMIN — SODIUM CHLORIDE 1000 ML: 0.9 INJECTION, SOLUTION INTRAVENOUS at 03:53

## 2020-01-04 RX ADMIN — ACETAMINOPHEN 650 MG: 325 TABLET, FILM COATED ORAL at 11:34

## 2020-01-04 RX ADMIN — FENTANYL CITRATE 50 MCG: 50 INJECTION INTRAMUSCULAR; INTRAVENOUS at 09:19

## 2020-01-04 RX ADMIN — FENTANYL CITRATE 25 MCG: 50 INJECTION INTRAMUSCULAR; INTRAVENOUS at 09:14

## 2020-01-04 RX ADMIN — FENTANYL CITRATE 50 MCG: 50 INJECTION INTRAMUSCULAR; INTRAVENOUS at 09:15

## 2020-01-04 RX ADMIN — DEXAMETHASONE SODIUM PHOSPHATE 4 MG: 10 INJECTION, SOLUTION INTRAMUSCULAR; INTRAVENOUS at 09:10

## 2020-01-04 RX ADMIN — PHENAZOPYRIDINE 100 MG: 100 TABLET ORAL at 12:06

## 2020-01-04 RX ADMIN — SODIUM CHLORIDE, SODIUM LACTATE, POTASSIUM CHLORIDE, AND CALCIUM CHLORIDE 125 ML/HR: .6; .31; .03; .02 INJECTION, SOLUTION INTRAVENOUS at 12:01

## 2020-01-04 RX ADMIN — HYDROMORPHONE HYDROCHLORIDE 0.2 MG: 1 INJECTION, SOLUTION INTRAMUSCULAR; INTRAVENOUS; SUBCUTANEOUS at 07:50

## 2020-01-04 RX ADMIN — LIDOCAINE HYDROCHLORIDE 50 MG: 10 INJECTION, SOLUTION EPIDURAL; INFILTRATION; INTRACAUDAL; PERINEURAL at 09:10

## 2020-01-04 RX ADMIN — FENTANYL CITRATE 50 MCG: 50 INJECTION INTRAMUSCULAR; INTRAVENOUS at 09:22

## 2020-01-04 RX ADMIN — ONDANSETRON 4 MG: 2 INJECTION INTRAMUSCULAR; INTRAVENOUS at 09:10

## 2020-01-04 RX ADMIN — HYDROMORPHONE HYDROCHLORIDE 0.2 MG: 1 INJECTION, SOLUTION INTRAMUSCULAR; INTRAVENOUS; SUBCUTANEOUS at 14:58

## 2020-01-04 RX ADMIN — MIDAZOLAM HYDROCHLORIDE 2 MG: 1 INJECTION, SOLUTION INTRAMUSCULAR; INTRAVENOUS at 09:01

## 2020-01-04 RX ADMIN — SODIUM CHLORIDE, SODIUM LACTATE, POTASSIUM CHLORIDE, AND CALCIUM CHLORIDE: .6; .31; .03; .02 INJECTION, SOLUTION INTRAVENOUS at 09:00

## 2020-01-04 RX ADMIN — KETOROLAC TROMETHAMINE 15 MG: 30 INJECTION, SOLUTION INTRAMUSCULAR at 02:08

## 2020-01-04 RX ADMIN — CEFAZOLIN SODIUM 2000 MG: 1 INJECTION, POWDER, FOR SOLUTION INTRAMUSCULAR; INTRAVENOUS at 09:09

## 2020-01-04 RX ADMIN — FENTANYL CITRATE 25 MCG: 50 INJECTION INTRAMUSCULAR; INTRAVENOUS at 09:12

## 2020-01-04 RX ADMIN — PHENAZOPYRIDINE 100 MG: 100 TABLET ORAL at 14:58

## 2020-01-04 NOTE — OP NOTE
OPERATIVE REPORT  PATIENT NAME: Yaya Pinto    :  1985  MRN: [id number]  Pt Location: AN OR ROOM 01    SURGERY DATE: 2020    Surgeon(s) and Role:     * Verenice Woodson MD - Primary    Preop Diagnosis:  Ureteral calculus, left [N20 1]  Hydronephrosis with ureteropelvic junction (UPJ) obstruction [Q62 11]  Anxiety with depression [F41 8]    Post-Op Diagnosis Codes:     * Ureteral calculus, left [N20 1]     * Hydronephrosis with ureteropelvic junction (UPJ) obstruction [Q62 11]     * Anxiety with depression [F41 8]    Procedure(s) (LRB):  CYSTOSCOPY URETEROSCOPY , RETROGRADE PYELOGRAM AND INSERTION STENT URETERAL (Left)    Specimen(s):  * No specimens in log *    Estimated Blood Loss:   Minimal    Drains:  6x24 LEFT JJ stent, no string    Anesthesia Type:   General    Operative Indications:  Ureteral calculus, left [N20 1]  Hydronephrosis with ureteropelvic junction (UPJ) obstruction [Q62 11]  Anxiety with depression [F41 8]      Operative Findings:  Hydronephrotic drip with placement of wire, hydronephrosis on retrograde pyelogram, no stone in the ureter, ureteral scope passed all the way to the ureteropelvic junction    Complications:   None    Procedure and Technique:  Yaya Pinto is a 27y o -year-old female  with a history of acute onset of flank pain and left-sided proximal stone  Risk and benefits of ureteroscopy were discussed and reviewed  Informed consent was obtained  The patient was brought to the operating room on 2020  After the smooth induction of general LMA anesthesia, the patient was placed in the dorsal lithotomy position  Her genitalia was prepped and draped in a sterile fashion  Intravenous antibiotics were administered in the form of Ancef  A timeout was performed with all members of the operative team confirmed the patient's identity, procedure to be performed, and laterality of the case      Patient was noted to have some mild anterior compartment prolapse of the bladder  A 22 Israeli rigid cystoscope with 30° lens was inserted  The bladder was thoroughly inspected  It was no evidence of mucosal abnormalities or lesions  There were no calculi identified  Attention was focused on the left ureteral orifice   flouroscopy demonstrated a non radioopaque stone  A Bard Solo Plus wire was passed proximal to the stone and secured as a safety  With placement of the wire, there was drainage of hydronephrotic drip  This appeared clear and without purulence  A dual lumen catheter was then advanced over the wire and used to dilate the very distal ureter under direct vision  A retrograde pyelogram was performed that demonstrated mild-to-moderate hydronephrosis  The dual-lumen was removed leaving the safety wire in place  A short semirigid ureteroscope was then inserted adjacent to the wire  The ureter was carefully inspected up to the level of the ureteropelvic junction  No stone was noted  Likely the stone had been pushed back into the renal pelvis  We were unable to navigate the scope into the renal pelvis  The ureteroscope was then repassed multiple times to ensure that the ureter was free and clear of any residual stones  Additional contrast was injected as a roadmap for stent insertion  The ureteroscope was then removed  The wire was backloaded through the cystoscope  The cystoscope was repassed into the bladder  A 6 Israeli 24 double-J ureteral stent was then placed over the previously banked safety wire without difficulty  The proximal coil was appreciated in the left renal pelvis and the distal coil was visualized within the bladder  The bladder was emptied and the cystoscope was removed  No string was left in place  2% viscous lidocaine was placed per urethra  Overall the patient tolerated the procedure well and were no complications   The patient was extubated in the operating room and transferred to the PACU in stable condition at the conclusion of the case  Plan-the stent will remain in place  Patient be treated with antispasmodics  Repeat BMP will be obtained  Patient will return for secondary stone surgery in the near future       I was present for the entire procedure    Patient Disposition:  PACU     SIGNATURE: Susan Carrion MD  DATE: January 4, 2020  TIME: 9:28 AM

## 2020-01-04 NOTE — H&P
[facility]   H&P- Krystle Matias 1985, 27 y o  female MRN: [id number]    Unit/Bed#: [facility] Encounter: [id number]    Primary Care Provider: Alexandre Ashraf DO   Date and time admitted to hospital: 1/4/2020  1:23 AM        * Kidney stone on left side  Assessment & Plan  · Left abdominal pain radiating into left flank rates a 10/10 prior to pain medication  Pain started around 1:00 p m  Yesterday  Patient denies any history of kidney stones or any urinary issues  · Pain management  · Labs in a m   · NPO  · Urology consult    Hydronephrosis with ureteropelvic junction (UPJ) obstruction  Assessment & Plan  · CT- There is mild to moderate hydronephrosis and perinephric stranding, the cause of which appears to be a 7 mm calculus at the UPJ  · Creatinine 1 50  · IV fluids  · BMP in a m  Nausea  Assessment & Plan  · Zofran p r n  For nausea    VTE Prophylaxis: Heparin  / reason for no mechanical VTE prophylaxis ambulatory    Code Status: Full  POLST: There is no POLST form on file for this patient (pre-hospital)  Discussion with family: none at bedside    Anticipated Length of Stay:  Patient will be admitted on an Observation basis with an anticipated length of stay of  less 2 midnights  Justification for Hospital Stay: further evaluation and monitoring of kidney stone and hydronephrosis  Total Time for Visit, including Counseling / Coordination of Care: 30 minutes  Greater than 50% of this total time spent on direct patient counseling and coordination of care  Chief Complaint:   Abdominal/flank pain     History of Present Illness:    Krystle Matias is a 27 y o  female who presents with left-sided upper quadrant abdominal pain radiating around to left flank  Pain is accompanied by nausea and several bouts of vomiting  Patient rates the pain a 10/10 prior to pain medication at which point the pain is now minimal   Patient states the pain started around 1 o'clock yesterday afternoon  Denies any history of kidney stones  Denies chest pain, shortness of breath, palpitations, dizziness, or diarrhea  Review of Systems:    Review of Systems   Constitutional: Positive for appetite change  Negative for chills and fatigue  HENT: Negative  Eyes: Negative  Respiratory: Negative for cough, chest tightness and shortness of breath  Gastrointestinal: Positive for abdominal pain (LUQ)  Negative for abdominal distention  Endocrine: Negative  Genitourinary: Positive for flank pain  Negative for dysuria, frequency, hematuria, pelvic pain and urgency  Skin: Negative  Neurological: Negative for dizziness and headaches  Hematological: Negative  Psychiatric/Behavioral: Negative  Past Medical and Surgical History:     Past Medical History:   Diagnosis Date    Acid reflux     Allergic     seasonal    Anxiety     Blurred vision     Fainting     Headache     Loss of appetite     Obesity     Spontaneous vaginal delivery     x3    Weakness        Past Surgical History:   Procedure Laterality Date    NO PAST SURGERIES         Meds/Allergies:    Prior to Admission medications    Medication Sig Start Date End Date Taking? Authorizing Provider   ALPRAZobrii Cortez) 0 5 mg tablet Take 1 tablet (0 5 mg total) by mouth daily at bedtime as needed for anxiety  Patient not taking: Reported on 11/25/2019 8/7/19   Lacey England DO   buPROPion (WELLBUTRIN XL) 150 mg 24 hr tablet Take 1 tablet (150 mg total) by mouth daily  Patient not taking: Reported on 11/25/2019 8/7/19   Ariel Pulliam DO   phentermine (ADIPEX-P) 37 5 MG tablet Take 1/2 tablet daily in AM 12/30/19   Mila Flaherty PA-C   varenicline (CHANTIX AYDE) 0 5 MG X 11 & 1 MG X 42 tablet Take one 0 5mg tab by mouth 1x daily for 3 days, then increase to one 0 5mg tab 2x daily for 3 days, then increase to one 1mg tab 2x daily 12/3/19   Moreno Prince DO     I have reviewed home medications with patient personally      Allergies: Allergies   Allergen Reactions    Other Itching     apples       Social History:     Marital Status: /Civil Union   Occupation:    Patient Pre-hospital Living Situation: with three children  Patient Pre-hospital Level of Mobility: independent  Patient Pre-hospital Diet Restrictions: regular  Substance Use History:   Social History     Substance and Sexual Activity   Alcohol Use Not Currently    Frequency: Monthly or less    Comment: monthly     Social History     Tobacco Use   Smoking Status Current Every Day Smoker    Packs/day: 0 25    Types: Cigarettes   Smokeless Tobacco Never Used   Tobacco Comment    currently 3cigs/day     Social History     Substance and Sexual Activity   Drug Use No       Family History:    Family History   Problem Relation Age of Onset    Hypertension Mother     Breast cancer Maternal Grandmother     Diabetes Maternal Grandmother     Thyroid disease Other     Autism Other     Anemia Father     Leukemia Sister     Diabetes Paternal Grandmother     Fibroids Sister     No Known Problems Brother     No Known Problems Sister     No Known Problems Sister     Colon cancer Neg Hx     Cervical cancer Neg Hx     Stroke Neg Hx        Physical Exam:     Vitals:   Blood Pressure: 123/90 (01/04/20 0124)  Pulse: 75 (01/04/20 0124)  Temperature: 98 4 °F (36 9 °C) (01/04/20 0124)  Temp Source: Oral (01/04/20 0124)  Respirations: 20 (01/04/20 0124)  Weight - Scale: 82 4 kg (181 lb 10 5 oz) (01/04/20 0124)  SpO2: 100 % (01/04/20 0126)    Physical Exam   Constitutional: She is oriented to person, place, and time  She appears well-developed and well-nourished  obeisty   HENT:   Head: Normocephalic and atraumatic  Eyes: Pupils are equal, round, and reactive to light  Neck: Normal range of motion  Cardiovascular: Normal rate and regular rhythm  No murmur heard  Pulmonary/Chest: Effort normal and breath sounds normal    Abdominal: Soft   Bowel sounds are normal  There is no splenomegaly or hepatomegaly  There is tenderness (LUQ) in the left upper quadrant  There is CVA tenderness (left )  Musculoskeletal: Normal range of motion  She exhibits no edema  Neurological: She is alert and oriented to person, place, and time  Skin: Skin is warm and dry  Psychiatric: She has a normal mood and affect  Her behavior is normal  Judgment and thought content normal          Additional Data:     Lab Results: I have personally reviewed pertinent reports  Results from last 7 days   Lab Units 01/04/20  0134   WBC Thousand/uL 12 22*   HEMOGLOBIN g/dL 13 9   HEMATOCRIT % 40 8   PLATELETS Thousands/uL 434*   NEUTROS PCT % 67   LYMPHS PCT % 24   MONOS PCT % 8   EOS PCT % 1     Results from last 7 days   Lab Units 01/04/20  0134   SODIUM mmol/L 134*   POTASSIUM mmol/L 3 9   CHLORIDE mmol/L 99*   CO2 mmol/L 23   BUN mg/dL 10   CREATININE mg/dL 1 50*   ANION GAP mmol/L 12   CALCIUM mg/dL 10 0   ALBUMIN g/dL 3 5   TOTAL BILIRUBIN mg/dL 0 85   ALK PHOS U/L 294*   ALT U/L 62   AST U/L 52*   GLUCOSE RANDOM mg/dL 110                       Imaging: I have personally reviewed pertinent reports  CT renal stone study abdomen pelvis wo contrast   Final Result by Carina Ball MD (01/04 0321)      Mild to moderate left-sided hydronephrosis and perinephric stranding, the cause of which appears to be a 7 mm calculus at the UPJ  Evaluation of the renal parenchyma was limited by the lack of intravenous contrast and therefore pyelonephritis cannot be excluded on this study  Correlation with a urinalysis is recommended  Multiple prominent but nonenlarged left para-aortic lymph nodes which may be reactive in nature  No prior studies are available for comparison  A repeat CT abdomen/pelvis in 1 month after the initiation of intravenous contrast is recommended for    further evaluation        Workstation performed: [id number]             EKG, Pathology, and Other Studies Reviewed on Admission: · EKG: none    Allscripts / Epic Records Reviewed: No     ** Please Note: This note has been constructed using a voice recognition system   **

## 2020-01-04 NOTE — ANESTHESIA PREPROCEDURE EVALUATION
Review of Systems/Medical History  Patient summary reviewed        Cardiovascular  Negative cardio ROS Exercise tolerance (METS): >4,     Pulmonary  Negative pulmonary ROS Smoker cigarette smoker  ,        GI/Hepatic  Negative GI/hepatic ROS   GERD ,        Negative  ROS Kidney stones,        Endo/Other  Negative endo/other ROS   Obesity    GYN  Negative gynecology ROS          Hematology  Negative hematology ROS      Musculoskeletal  Negative musculoskeletal ROS        Neurology  Negative neurology ROS   Headaches,    Psychology   Negative psychology ROS Anxiety, Depression ,              Physical Exam    Airway    Mallampati score: II  TM Distance: >3 FB  Neck ROM: full     Dental   No notable dental hx     Cardiovascular  Comment: Negative ROS,     Pulmonary      Other Findings        Anesthesia Plan  ASA Score- 2 Emergent    Anesthesia Type- general with ASA Monitors  Additional Monitors:   Airway Plan: LMA  Comment: Patient seen and examined, history reviewed  Patient to be done under general anesthesia with LMA and routine monitors  Risks discussed with the patient, consent obtained        Plan Factors-    Induction- intravenous  Postoperative Plan-     Informed Consent- Anesthetic plan and risks discussed with patient  I personally reviewed this patient with the CRNA  Discussed and agreed on the Anesthesia Plan with the CRNA  Krissy Spring

## 2020-01-04 NOTE — DISCHARGE INSTR - AVS FIRST PAGE
· Please follow-up with your primary care physician for repeat blood work (BMP) on Monday 1/6/2020 to assess renal function

## 2020-01-04 NOTE — DISCHARGE SUMMARY
Discharge Summary - [facility] Internal Medicine    Patient Information: Dai De Los Santos 27 y o  female MRN: [id number]  Unit/Bed#: W -45 Encounter: [id number]    Discharging Physician / Practitioner: Steve Lei MD  PCP: Georgia Caruso DO  Admission Date: 1/4/2020  Discharge Date: 01/04/20    Reason for Admission:  Nephrolithiasis    Discharge Diagnoses:     Principal Problem:    Kidney stone on left side  Active Problems:    Anxiety with depression    Nausea    Hydronephrosis with ureteropelvic junction (UPJ) obstruction    Ureteral calculus, left      Consultations During Hospital Stay:  · Urology    Procedures Performed:   · CYSTOSCOPY URETEROSCOPY , RETROGRADE PYELOGRAM AND INSERTION STENT URETERAL (Left)    Significant findings:  · CT renal stone study  Mild to moderate left-sided hydronephrosis and perinephric stranding, the cause of which appears to be a 7 mm calculus at the UPJ     Evaluation of the renal parenchyma was limited by the lack of intravenous contrast and therefore pyelonephritis cannot be excluded on this study  Correlation with a urinalysis is recommended    Multiple prominent but nonenlarged left para-aortic lymph nodes which may be reactive in nature  No prior studies are available for comparison  A repeat CT abdomen/pelvis in 1 month after the initiation of intravenous contrast is recommended for   further evaluation  Hospital Course:   Dai De Los Santos is a 27 y o  female patient who originally presented to the hospital on 1/4/2020 due to left-sided flank pain  Patient was admitted with a diagnosis of left renal stone  She was seen in consultation by the urologist and underwent above procedure with insertion of left ureteral stent  Patient tolerated procedure well  She had mild improvement in creatinine level with stable renal function postprocedure  She was started on Pyridium and Ditropan in addition to Flomax to control stent pain    She is to follow up with the urologist outpatient as instructed for outpatient stent removal     Condition at Discharge: good     Discharge Day Visit / Exam:     Subjective:  Reports some left flank discomfort, otherwise no complaints  Vitals: Blood Pressure: 101/66 (01/04/20 1355)  Pulse: 87 (01/04/20 1355)  Temperature: 97 9 °F (36 6 °C) (01/04/20 1310)  Temp Source: Oral (01/04/20 1022)  Respirations: 16 (01/04/20 1355)  Weight - Scale: 82 4 kg (181 lb 10 5 oz) (01/04/20 0124)  SpO2: 99 % (01/04/20 1355)    General Appearance:    Alert, cooperative, no distress, appropriately responsive    Head:    Normocephalic, without obvious abnormality, atraumatic, mucous membranes moist    Eyes:    Conjunctiva/corneas clear, EOM's intact   Neck:   Supple   Lungs:     Clear to auscultation bilaterally, respirations unlabored, no crackles or wheeze     Heart:    Regular rate and rhythm, S1 and S2    Abdomen:     Soft, non-tender, bowel sounds active all four quadrants,     no masses, no organomegaly   Extremities:   Extremities normal, atraumatic, no cyanosis or edema   Neurologic:  nonfocal      Discharge instructions/Information to patient and family:   See after visit summary for information provided to patient and family  Provisions for Follow-Up Care:  See after visit summary for information related to follow-up care and any pertinent home health orders  Disposition: Home    Patient and spouse updated at the bedside, all questions answered    Discharge Statement:  I spent >30 minutes discharging the patient  This time was spent on the day of discharge  I had direct contact with the patient on the day of discharge  Greater than 50% of the total time was spent examining patient, answering all patient questions, arranging and discussing plan of care with patient as well as directly providing post-discharge instructions  Additional time then spent on discharge activities      Discharge Medications:  See after visit summary for reconciled discharge medications provided to patient and family  ** Please Note: Dragon 360 Dictation voice to text software may have been used in the creation of this document   **

## 2020-01-04 NOTE — ASSESSMENT & PLAN NOTE
· Left abdominal pain radiating into left flank rates a 10/10 prior to pain medication  Pain started around 1:00 p m  Yesterday  Patient denies any history of kidney stones or any urinary issues    · Pain management  · Labs in a m   · NPO  · Urology consult

## 2020-01-04 NOTE — H&P (VIEW-ONLY)
UROLOGY CONSULTATION NOTE     Patient Identifiers: Soundra Holstein (MRN [id number])  Service Requesting Consultation:  Medicine  Service Providing Consultation:  Urology, Kolby Magana MD    Date of Service: 1/4/2020    Reason for Consultation:  Left ureteral stone      ASSESSMENT:     3 79-year-old female with left proximal ureteral stone with hydronephrosis     PLAN:     Discussed the findings of the CT scan with the patient  Discussed options including trial of passage over the next 30 days with pain control if improvement in creatinine can be demonstrated with fluid hydration  Alternative would be surgical intervention  Discussed option of cystoscopy, [LEFT] retrograde pyelogram, ureteroscopy with possible laser lithotripsy and basket extraction of stone, stent placement  Given the proximal nature of the stone, patient understands we may not be able to reach the stone with ureteroscopy today and decompressing stent may be placed with need for repeat intervention sometime over the next several weeks  She has opted to move forward with surgical intervention  Informed consent was signed in the left side was marked  Thank you for allowing me to participate in this patients care  Please do not hesitate to call with any additional questions  Kolby Magana MD    History of Present Illness:     Soundra Holstein is a 27 y o  old with a history of acute onset of left-sided flank pain yesterday  Patient denies prior history of stone disease  She reports nausea and vomiting associated with the pain  She proceeded to the emergency room and was noted to have a proximal left-sided stone with hydronephrosis  She had a mild acute kidney injury  She has no infectious signs or symptoms  Denies fevers and chills      Past Medical, Past Surgical History:     Past Medical History:   Diagnosis Date    Acid reflux     Allergic     seasonal    Anxiety     Blurred vision     Fainting     Headache  Loss of appetite     Obesity     Spontaneous vaginal delivery     x3    Weakness    :    Past Surgical History:   Procedure Laterality Date    NO PAST SURGERIES     :    Medications, Allergies:     Current Facility-Administered Medications:     acetaminophen (TYLENOL) tablet 650 mg, 650 mg, Oral, Q6H PRN, Justina Doriscemaker, CRNP    calcium carbonate (TUMS) chewable tablet 1,000 mg, 1,000 mg, Oral, Daily PRN, Justina Peacemaker, CRNP    HYDROmorphone (DILAUDID) injection 0 2 mg, 0 2 mg, Intravenous, Q4H PRN, Justina Ngamaker, CRNP, 0 2 mg at 01/04/20 0750    ondansetron (ZOFRAN) injection 4 mg, 4 mg, Intravenous, Q6H PRN, Justina Sylvestermaker, CRNP    sodium chloride 0 9 % infusion, 125 mL/hr, Intravenous, Continuous, Justina Sylvestermaker, CRNP, Last Rate: 125 mL/hr at 01/04/20 0506, 125 mL/hr at 01/04/20 0506    Allergies:   Allergies   Allergen Reactions    Other Itching     apples   :    Social and Family History:   Social History:   Social History     Tobacco Use    Smoking status: Current Every Day Smoker     Packs/day: 0 25     Types: Cigarettes    Smokeless tobacco: Never Used    Tobacco comment: currently 3cigs/day   Substance Use Topics    Alcohol use: Not Currently     Frequency: Monthly or less     Comment: monthly    Drug use: No        Social History     Tobacco Use   Smoking Status Current Every Day Smoker    Packs/day: 0 25    Types: Cigarettes   Smokeless Tobacco Never Used   Tobacco Comment    currently 3cigs/day       Family History:  Family History   Problem Relation Age of Onset    Hypertension Mother     Breast cancer Maternal Grandmother     Diabetes Maternal Grandmother     Thyroid disease Other     Autism Other     Anemia Father     Leukemia Sister     Diabetes Paternal Grandmother     Fibroids Sister     No Known Problems Brother     No Known Problems Sister     No Known Problems Sister     Colon cancer Neg Hx     Cervical cancer Neg Hx     Stroke Neg Hx    : Review of Systems:     General: Fever, chills, or night sweats: negative  Cardiac: Negative for chest pain  Pulmonary: Negative for shortness of breath  Gastrointestinal: Abdominal pain positive  Nausea, vomiting, or diarrhea positive,  Genitourinary: See HPI above  Patient does not have hematuria  All other systems queried were negative  Physical Exam:   General: Patient is pleasant and in NAD  Awake and alert  /71 (BP Location: Left arm)   Pulse 69   Temp 98 2 °F (36 8 °C) (Oral)   Resp 20   Wt 82 4 kg (181 lb 10 5 oz)   SpO2 100%   BMI 34 32 kg/m²   Cardiac: Peripheral edema: negative  Pulmonary: Non-labored breathing  Abdomen: Soft, non-tender, non-distended  No surgical scars  No masses, tenderness, hernias noted  Genitourinary: Positive CVA tenderness, negative suprapubic tenderness      Labs:     Lab Results   Component Value Date    HGB 13 9 01/04/2020    HCT 40 8 01/04/2020    WBC 12 22 (H) 01/04/2020     (H) 01/04/2020   ]    Lab Results   Component Value Date    K 3 9 01/04/2020    CL 99 (L) 01/04/2020    CO2 23 01/04/2020    BUN 10 01/04/2020    CREATININE 1 50 (H) 01/04/2020    CALCIUM 10 0 01/04/2020 1/4/20 0215     RBC, UA None Seen, 0-5 /hpf 1-2Abnormal     WBC, UA None Seen, 0-5, 5-55, 5-65 /hpf 1-2Abnormal     Epithelial Cells None Seen, Occasional /hpf Occasional    Bacteria, UA None Seen, Occasional /hpf Occasional    MUCUS THREADS None Seen OccasionalAbnormal           Specimen Collected: 01/04/20             Imaging:   I personally reviewed the images and report of the following studies, and reviewed them with the patient:    1/4/19  CT ABDOMEN AND PELVIS WITHOUT IV CONTRAST - LOW DOSE RENAL STONE      INDICATION:   Left CVA tenderness      COMPARISON:  None      TECHNIQUE:  Low dose thin section CT examination of the abdomen and pelvis was performed without intravenous or oral contrast according to a protocol specifically designed to evaluate for urinary tract calculus  Axial, sagittal, and coronal 2D   reformatted images were created from the source data and submitted for interpretation  Evaluation for pathology in the abdomen and pelvis that is unrelated to urinary tract calculi is limited       Radiation dose length product (DLP) for this visit:  399 mGy-cm   This examination, like all CT scans performed in the [facility], was performed utilizing techniques to minimize radiation dose exposure, including the use of iterative   reconstruction and automated exposure control       FINDINGS:     RIGHT KIDNEY AND URETER:  No urinary tract calculi  No hydronephrosis or hydroureter      LEFT KIDNEY AND URETER:  There is mild to moderate hydronephrosis and perinephric stranding, the cause of which appears to be a 7 mm calculus at the UPJ  A small amount of perinephric fluid is also present  There is a 3 mm nonobstructing lower pole calculus      URINARY BLADDER:   Unremarkable      No significant abnormality in the visualized lung bases      Limited low radiation dose noncontrast CT evaluation demonstrates no clinically significant abnormality of liver, spleen, pancreas, or adrenal glands  There are gallstone(s) within the gallbladder, without pericholecystic inflammatory changes  There is a small amount of pelvic free fluid  No free intraperitoneal air  Bowel loops appear unremarkable  There are multiple scattered prominent but nonenlarged left para-aortic lymph nodes which may be reactive in nature  The appendix is well seen and there is no evidence of acute appendicitis    No acute fracture or destructive osseous lesion is identified         IMPRESSION:     Mild to moderate left-sided hydronephrosis and perinephric stranding, the cause of which appears to be a 7 mm calculus at the UPJ       Evaluation of the renal parenchyma was limited by the lack of intravenous contrast and therefore pyelonephritis cannot be excluded on this study   Correlation with a urinalysis is recommended      Multiple prominent but nonenlarged left para-aortic lymph nodes which may be reactive in nature  No prior studies are available for comparison  A repeat CT abdomen/pelvis in 1 month after the initiation of intravenous contrast is recommended for   further evaluation

## 2020-01-04 NOTE — ANESTHESIA POSTPROCEDURE EVALUATION
Post-Op Assessment Note    CV Status:  Stable  Pain Score: 0    Pain management: adequate     Mental Status:  Alert and awake   Hydration Status:  Euvolemic   PONV Controlled:  Controlled   Airway Patency:  Patent   Post Op Vitals Reviewed: Yes      Staff: Anesthesiologist           BP (!) 88/49 (01/04/20 0931)    Temp (!) 96 8 °F (36 °C) (01/04/20 0931)    Pulse (!) 122 (01/04/20 0931)   Resp 16 (01/04/20 0931)    SpO2 100 % (01/04/20 0931)

## 2020-01-04 NOTE — ASSESSMENT & PLAN NOTE
· CT- There is mild to moderate hydronephrosis and perinephric stranding, the cause of which appears to be a 7 mm calculus at the UPJ  · Creatinine 1 50  · IV fluids  · BMP in a m

## 2020-01-04 NOTE — UTILIZATION REVIEW
Initial Clinical Review    Admission: Date/Time/Statement:   Orders Placed This Encounter   Procedures    Place in Observation (expected length of stay for this patient is less than two midnights)     Standing Status:   Standing     Number of Occurrences:   1     Order Specific Question:   Admitting Physician     Answer:   Michelle Velasco [[id number]]     Order Specific Question:   Level of Care     Answer:   Med Surg [[id number]]     ED Arrival Information     Expected Arrival Acuity Means of Arrival Escorted By Service Admission Type    - 1/4/2020 01:08 Urgent Walk-In Self General Medicine Urgent    Arrival Complaint    Abdominal pain, back pain        Chief Complaint   Patient presents with    Abdominal Pain     pt  comes in c/o LLQ abdominal pain and left sided flank pain that started around 1pm yesterday afternoon  +nausea and vomiting  Pt  states she has urinary urgency but nothing comes out  Denies any chest pain   Flank Pain       HPI:   Yaya Pinto is a 27 y o  female who presents with left-sided upper quadrant abdominal pain radiating around to left flank  Pain is accompanied by nausea and several bouts of vomiting  Patient rates the pain a 10/10 prior to pain medication at which point the pain is now minimal   Patient states the pain started around 1 o'clock yesterday afternoon  Denies any history of kidney stones  Denies chest pain, shortness of breath, palpitations, dizziness, or diarrhea  CT revealed mild to moderate hydronephrosis of left kidney  Labs showed elevated creatinine at 1 5  Physical exam: Abdomen: There is tenderness (LUQ) in the left upper quadrant  There is CVA tenderness (left)  Plan: Admit to Observation for evaluation and treatment of left kidney stone with hydronephrosis with ureteropelvic junction obstruction: NPO, consult Urology, BMP in a m , IV fluids       1/4 Urology Consult:  Discussed options including trial of passage over the next 30 days with pain control if improvement in creatinine can be demonstrated with fluid hydration  Alternative would be surgical intervention  Discussed option of cystoscopy, [LEFT] retrograde pyelogram, ureteroscopy with possible laser lithotripsy and basket extraction of stone, stent placement  Given the proximal nature of the stone, patient understands we may not be able to reach the stone with ureteroscopy today and decompressing stent may be placed with need for repeat intervention sometime over the next several weeks  She has opted to move forward with surgical intervention      1/4 Operative Note:    Preop Diagnosis:  Ureteral calculus, left [N20 1]  Hydronephrosis with ureteropelvic junction (UPJ) obstruction [Q62 11]  Anxiety with depression [F41 8]     Post-Op Diagnosis Codes:     * Ureteral calculus, left [N20 1]     * Hydronephrosis with ureteropelvic junction (UPJ) obstruction [Q62 11]     * Anxiety with depression [F41 8]     Procedure(s) (LRB):  CYSTOSCOPY URETEROSCOPY , RETROGRADE PYELOGRAM AND INSERTION STENT URETERAL (Left)       Estimated Blood Loss: Minimal     Drains: 6x24 LEFT JJ stent, no string     Anesthesia Type:  General     Operative Indications: Ureteral calculus, left [N20 1]  Hydronephrosis with ureteropelvic junction (UPJ) obstruction [Q62 11]  Anxiety with depression [F41 8]        Operative Findings:  Hydronephrotic drip with placement of wire, hydronephrosis on retrograde pyelogram, no stone in the ureter, ureteral scope passed all the way to the ureteropelvic junction         ED Triage Vitals   Temperature Pulse Respirations Blood Pressure SpO2   01/04/20 0124 01/04/20 0124 01/04/20 0124 01/04/20 0124 01/04/20 0126   98 4 °F (36 9 °C) 75 20 123/90 100 %      Temp Source Heart Rate Source Patient Position - Orthostatic VS BP Location FiO2 (%)   01/04/20 0124 01/04/20 0124 01/04/20 0124 01/04/20 0124 --   Oral Monitor Lying Right arm       Pain Score       01/04/20 0750       8        Wt Readings from Last 1 Encounters:   01/04/20 82 4 kg (181 lb 10 5 oz)     Additional Vital Signs:     Date/Time  Temp  Pulse  Resp  BP  MAP (mmHg)  SpO2  O2 Device  Cardiac (WDL)  Patient Position - Orthostatic VS   01/04/20 10:35:22  --  90  18  119/83  95  99 %  --  --  --   01/04/20 10:22:23  98 °F   99  20  121/82  95  100 %  --  --  Sitting   01/04/20 0957  96 9    112Abnormal   18  104/72  --  99 %  None (Room air)  --  --   01/04/20 0945  --  134Abnormal    22  109/78  --  98 %  None (Room air)  X  --   01/04/20 0931  96 8    122Abnormal   16  88/49Abnormal   --  100 %  Simple mask  X  --   01/04/20 0700  98 2  69  --  119/71  --  100 %  None (Room air)  --  Lying   01/04/20 0153  --  --  --  --  --  --  None (Room air)  --  --   01/04/20 0126  --  --  --  --  --  100 %  None (Room air)  --         Pertinent Labs/Diagnostic Test Results:     1/4 CT renal stone study AP:  Mild to moderate left-sided hydronephrosis and perinephric stranding, the cause of which appears to be a 7 mm calculus at the UPJ  Evaluation of the renal parenchyma was limited by the lack of intravenous contrast and therefore pyelonephritis cannot be excluded on this study  Correlation with a urinalysis is recommended        Results from last 7 days   Lab Units 01/04/20  1045 01/04/20  0134   WBC Thousand/uL 9 92 12 22*   HEMOGLOBIN g/dL 11 9 13 9   HEMATOCRIT % 36 1 40 8   PLATELETS Thousands/uL 348 434*   NEUTROS ABS Thousands/µL  --  8 16*         Results from last 7 days   Lab Units 01/04/20  1045 01/04/20  0134   SODIUM mmol/L 139 134*   POTASSIUM mmol/L 3 6 3 9   CHLORIDE mmol/L 105 99*   CO2 mmol/L 26 23   ANION GAP mmol/L 8 12   BUN mg/dL 9 10   CREATININE mg/dL 1 41* 1 50*   EGFR ml/min/1 73sq m 56 52   CALCIUM mg/dL 8 7 10 0     Results from last 7 days   Lab Units 01/04/20  0134   AST U/L 52*   ALT U/L 62   ALK PHOS U/L 294*   TOTAL PROTEIN g/dL 9 1*   ALBUMIN g/dL 3 5   TOTAL BILIRUBIN mg/dL 0 85         Results from last 7 days   Lab Units 01/04/20  1045 01/04/20  0134   GLUCOSE RANDOM mg/dL 106 110         Results from last 7 days   Lab Units 01/04/20  0134   LIPASE u/L 73             Results from last 7 days   Lab Units 01/04/20  0215   CLARITY UA  Clear   COLOR UA  Yellow   SPEC GRAV UA  1 025   PH UA  5 5   GLUCOSE UA mg/dl Negative   KETONES UA mg/dl Trace*   BLOOD UA  Small*   PROTEIN UA mg/dl Negative   NITRITE UA  Negative   BILIRUBIN UA  Negative   UROBILINOGEN UA E U /dl 1 0   LEUKOCYTES UA  Negative   WBC UA /hpf 1-2*   RBC UA /hpf 1-2*   BACTERIA UA /hpf Occasional   EPITHELIAL CELLS WET PREP /hpf Occasional   MUCUS THREADS  Occasional*     ED Treatment:   Medication Administration from 01/04/2020 0108 to 01/04/2020 0435       Date/Time Order Dose Route Action Comments     01/04/2020 0206 ondansetron (ZOFRAN) injection 4 mg 4 mg Intravenous Given      01/04/2020 7629 ketorolac (TORADOL) injection 15 mg 15 mg Intravenous Given      01/04/2020 0402 tamsulosin (FLOMAX) capsule 0 4 mg 0 4 mg Oral Given      01/04/2020 0353 sodium chloride 0 9 % bolus 1,000 mL 1,000 mL Intravenous New Bag         Past Medical History:   Diagnosis Date    Acid reflux     Allergic     seasonal    Anxiety     Blurred vision     Fainting     Headache     Loss of appetite     Obesity     Spontaneous vaginal delivery     x3    Weakness      Present on Admission:   Hydronephrosis with ureteropelvic junction (UPJ) obstruction   Kidney stone on left side   Nausea      Admitting Diagnosis: Lymphadenopathy [R59 1]  Abdominal pain [R10 9]  Ureteral calculus, left [N20 1]  Hydronephrosis with ureteropelvic junction (UPJ) obstruction [Q62 11]  Age/Sex: 27 y o  female     Admission Orders: NPO, Urology consult       Scheduled Medications:    Medications:  oxybutynin 5 mg Oral Daily   phenazopyridine 100 mg Oral TID With Meals     Continuous IV Infusions:    lactated ringers 125 mL/hr Intravenous Continuous     PRN Meds:    acetaminophen 650 mg Oral Q6H PRN calcium carbonate 1,000 mg Oral Daily PRN   HYDROmorphone 0 2 mg Intravenous Q4H PRN   ondansetron 4 mg Intravenous Q6H PRN       IP CONSULT TO UROLOGY    Network Utilization Review Department  [email or web address]  ATTENTION: Please call with any questions or concerns to [phone] and carefully listen to the prompts so that you are directed to the right person  All voicemails are confidential   [employer] [employer] all requests for admission clinical reviews, approved or denied determinations and any other requests to dedicated fax number below belonging to the campus where the patient is receiving treatment   List of dedicated fax numbers for the Facilities:  [address] DENIALS (Administrative/Medical Necessity) [phone]   [address] (Maternity/NICU/Pediatrics) [phone]   Tung Barry [phone]   Alma Bolton [phone]   [facility] [phone]   Simran Laura [phone]   [address] [facility] [address] [facility] [phone]   [facility]  [phone]   [address] [facility] [address]  [address] [facility] [address] [facility] [phone]

## 2020-01-04 NOTE — DISCHARGE INSTRUCTIONS
You underwent attempted stone extraction on the left side that the stone had pushed back out of the ureter into the kidney  Because of this,a  stent was placed in you will require a 2nd surgery which will be arranged by our office  So you are aware, the stent that was placed is not permanent and needs to be removed or exchanged  If left in place longer than 3-6 months, can cause serious kidney complications  Ureteral Stent Placement   WHAT YOU NEED TO KNOW:   Ureteral stent placement is a procedure to open a blocked or narrow ureter  The ureter is the tube that carries urine from your kidney into your bladder  A stent is a thin hollow plastic tube used to hold your ureter open and allow urine to flow  The stent may stay in for several weeks  DISCHARGE INSTRUCTIONS:   Medicines:   · Pain medicine  may be given to take away or decrease pain  Do not wait until the pain is severe before you take your medicine  · Antibiotics  help prevent infections  Your healthcare provider may prescribe these for you while your stent remains in  · Take your medicine as directed  Contact your healthcare provider if you think your medicine is not helping or if you have side effects  Tell him or her if you are allergic to any medicine  Keep a list of the medicines, vitamins, and herbs you take  Include the amounts, and when and why you take them  Bring the list or the pill bottles to follow-up visits  Carry your medicine list with you in case of an emergency  Follow up with your urologist as directed: You will need regular follow-up visits with your urologist as long as the stent remains in  He will check to make sure the stent is working properly  He may do urine cultures to check for infection  Write down your questions so you remember to ask them during your visits  Self-care:   · Drink liquids  as directed  Ask your healthcare provider how much liquid to drink each day and which liquids are best for you   Fluids such as cranberry or apple juice may be especially helpful to prevent urinary infections  · Return to normal activities  the day after your stent placement or as directed by your healthcare provider  · You may take a shower  the day after your stent placement if your healthcare provider says it is okay  Contact your healthcare provider or urologist if:   · You have a fever or chills  · You feel like you need to urinate often  · You have pain when you urinate or pain around your bladder or kidney  · You see blood in your urine or it looks cloudy  · You have questions or concerns about your condition or care  Seek care immediately or call 911 if:   · You urinate little or not at all  · You have severe pain in your abdomen  © 2017 [employer] Information is for End User's use only and may not be sold, redistributed or otherwise used for commercial purposes  All illustrations and images included in CareNotes® are the copyrighted property of [employer]  or [employer]  The above information is an  only  It is not intended as medical advice for individual conditions or treatments  Talk to your doctor, nurse or pharmacist before following any medical regimen to see if it is safe and effective for you

## 2020-01-04 NOTE — CONSULTS
UROLOGY CONSULTATION NOTE     Patient Identifiers: rBandon Rahman (MRN [id number])  Service Requesting Consultation:  Medicine  Service Providing Consultation:  Urology, Duc Alex MD    Date of Service: 1/4/2020    Reason for Consultation:  Left ureteral stone      ASSESSMENT:     3 71-year-old female with left proximal ureteral stone with hydronephrosis     PLAN:     Discussed the findings of the CT scan with the patient  Discussed options including trial of passage over the next 30 days with pain control if improvement in creatinine can be demonstrated with fluid hydration  Alternative would be surgical intervention  Discussed option of cystoscopy, [LEFT] retrograde pyelogram, ureteroscopy with possible laser lithotripsy and basket extraction of stone, stent placement  Given the proximal nature of the stone, patient understands we may not be able to reach the stone with ureteroscopy today and decompressing stent may be placed with need for repeat intervention sometime over the next several weeks  She has opted to move forward with surgical intervention  Informed consent was signed in the left side was marked  Thank you for allowing me to participate in this patients care  Please do not hesitate to call with any additional questions  Duc Alex MD    History of Present Illness:     Brandon Rahman is a 27 y o  old with a history of acute onset of left-sided flank pain yesterday  Patient denies prior history of stone disease  She reports nausea and vomiting associated with the pain  She proceeded to the emergency room and was noted to have a proximal left-sided stone with hydronephrosis  She had a mild acute kidney injury  She has no infectious signs or symptoms  Denies fevers and chills      Past Medical, Past Surgical History:     Past Medical History:   Diagnosis Date    Acid reflux     Allergic     seasonal    Anxiety     Blurred vision     Fainting     Headache  Loss of appetite     Obesity     Spontaneous vaginal delivery     x3    Weakness    :    Past Surgical History:   Procedure Laterality Date    NO PAST SURGERIES     :    Medications, Allergies:     Current Facility-Administered Medications:     acetaminophen (TYLENOL) tablet 650 mg, 650 mg, Oral, Q6H PRN, Theotis Shames, CRNP    calcium carbonate (TUMS) chewable tablet 1,000 mg, 1,000 mg, Oral, Daily PRN, Theotis Shames, CRNP    HYDROmorphone (DILAUDID) injection 0 2 mg, 0 2 mg, Intravenous, Q4H PRN, Theotis Shames, CRNP, 0 2 mg at 01/04/20 0750    ondansetron (ZOFRAN) injection 4 mg, 4 mg, Intravenous, Q6H PRN, Theotis Shames, CRNP    sodium chloride 0 9 % infusion, 125 mL/hr, Intravenous, Continuous, Theotis Shames, CRNP, Last Rate: 125 mL/hr at 01/04/20 0506, 125 mL/hr at 01/04/20 0506    Allergies:   Allergies   Allergen Reactions    Other Itching     apples   :    Social and Family History:   Social History:   Social History     Tobacco Use    Smoking status: Current Every Day Smoker     Packs/day: 0 25     Types: Cigarettes    Smokeless tobacco: Never Used    Tobacco comment: currently 3cigs/day   Substance Use Topics    Alcohol use: Not Currently     Frequency: Monthly or less     Comment: monthly    Drug use: No        Social History     Tobacco Use   Smoking Status Current Every Day Smoker    Packs/day: 0 25    Types: Cigarettes   Smokeless Tobacco Never Used   Tobacco Comment    currently 3cigs/day       Family History:  Family History   Problem Relation Age of Onset    Hypertension Mother     Breast cancer Maternal Grandmother     Diabetes Maternal Grandmother     Thyroid disease Other     Autism Other     Anemia Father     Leukemia Sister     Diabetes Paternal Grandmother     Fibroids Sister     No Known Problems Brother     No Known Problems Sister     No Known Problems Sister     Colon cancer Neg Hx     Cervical cancer Neg Hx     Stroke Neg Hx    : Review of Systems:     General: Fever, chills, or night sweats: negative  Cardiac: Negative for chest pain  Pulmonary: Negative for shortness of breath  Gastrointestinal: Abdominal pain positive  Nausea, vomiting, or diarrhea positive,  Genitourinary: See HPI above  Patient does not have hematuria  All other systems queried were negative  Physical Exam:   General: Patient is pleasant and in NAD  Awake and alert  /71 (BP Location: Left arm)   Pulse 69   Temp 98 2 °F (36 8 °C) (Oral)   Resp 20   Wt 82 4 kg (181 lb 10 5 oz)   SpO2 100%   BMI 34 32 kg/m²   Cardiac: Peripheral edema: negative  Pulmonary: Non-labored breathing  Abdomen: Soft, non-tender, non-distended  No surgical scars  No masses, tenderness, hernias noted  Genitourinary: Positive CVA tenderness, negative suprapubic tenderness      Labs:     Lab Results   Component Value Date    HGB 13 9 01/04/2020    HCT 40 8 01/04/2020    WBC 12 22 (H) 01/04/2020     (H) 01/04/2020   ]    Lab Results   Component Value Date    K 3 9 01/04/2020    CL 99 (L) 01/04/2020    CO2 23 01/04/2020    BUN 10 01/04/2020    CREATININE 1 50 (H) 01/04/2020    CALCIUM 10 0 01/04/2020 1/4/20 0215     RBC, UA None Seen, 0-5 /hpf 1-2Abnormal     WBC, UA None Seen, 0-5, 5-55, 5-65 /hpf 1-2Abnormal     Epithelial Cells None Seen, Occasional /hpf Occasional    Bacteria, UA None Seen, Occasional /hpf Occasional    MUCUS THREADS None Seen OccasionalAbnormal           Specimen Collected: 01/04/20             Imaging:   I personally reviewed the images and report of the following studies, and reviewed them with the patient:    1/4/19  CT ABDOMEN AND PELVIS WITHOUT IV CONTRAST - LOW DOSE RENAL STONE      INDICATION:   Left CVA tenderness      COMPARISON:  None      TECHNIQUE:  Low dose thin section CT examination of the abdomen and pelvis was performed without intravenous or oral contrast according to a protocol specifically designed to evaluate for urinary tract calculus  Axial, sagittal, and coronal 2D   reformatted images were created from the source data and submitted for interpretation  Evaluation for pathology in the abdomen and pelvis that is unrelated to urinary tract calculi is limited       Radiation dose length product (DLP) for this visit:  399 mGy-cm   This examination, like all CT scans performed in the [facility], was performed utilizing techniques to minimize radiation dose exposure, including the use of iterative   reconstruction and automated exposure control       FINDINGS:     RIGHT KIDNEY AND URETER:  No urinary tract calculi  No hydronephrosis or hydroureter      LEFT KIDNEY AND URETER:  There is mild to moderate hydronephrosis and perinephric stranding, the cause of which appears to be a 7 mm calculus at the UPJ  A small amount of perinephric fluid is also present  There is a 3 mm nonobstructing lower pole calculus      URINARY BLADDER:   Unremarkable      No significant abnormality in the visualized lung bases      Limited low radiation dose noncontrast CT evaluation demonstrates no clinically significant abnormality of liver, spleen, pancreas, or adrenal glands  There are gallstone(s) within the gallbladder, without pericholecystic inflammatory changes  There is a small amount of pelvic free fluid  No free intraperitoneal air  Bowel loops appear unremarkable  There are multiple scattered prominent but nonenlarged left para-aortic lymph nodes which may be reactive in nature  The appendix is well seen and there is no evidence of acute appendicitis    No acute fracture or destructive osseous lesion is identified         IMPRESSION:     Mild to moderate left-sided hydronephrosis and perinephric stranding, the cause of which appears to be a 7 mm calculus at the UPJ       Evaluation of the renal parenchyma was limited by the lack of intravenous contrast and therefore pyelonephritis cannot be excluded on this study   Correlation with a urinalysis is recommended      Multiple prominent but nonenlarged left para-aortic lymph nodes which may be reactive in nature  No prior studies are available for comparison  A repeat CT abdomen/pelvis in 1 month after the initiation of intravenous contrast is recommended for   further evaluation

## 2020-01-06 ENCOUNTER — TELEPHONE (OUTPATIENT)
Dept: FAMILY MEDICINE CLINIC | Facility: CLINIC | Age: 35
End: 2020-01-06

## 2020-01-06 ENCOUNTER — TELEPHONE (OUTPATIENT)
Dept: UROLOGY | Facility: AMBULATORY SURGERY CENTER | Age: 35
End: 2020-01-06

## 2020-01-06 DIAGNOSIS — N13.39 OTHER HYDRONEPHROSIS: Primary | ICD-10-CM

## 2020-01-06 NOTE — TELEPHONE ENCOUNTER
Patient managed by Aubrie Bradley had surgery on 1/04/20 is calling to set up follow up and stated she needed second surgery  Please advise

## 2020-01-06 NOTE — TELEPHONE ENCOUNTER
Called and spoke with patient  Informed patient that the surgery scheduler will be reaching out to her sometime this week  Per Dr Sharmila Louie note he would like patient to have a repeat BMP, patient states that PCP will not order  Please advise

## 2020-01-06 NOTE — TELEPHONE ENCOUNTER
I believe second surgery has been ordered  Will confirm with schedulers and await date and time  Please let patient know she should expect call within the week

## 2020-01-07 ENCOUNTER — APPOINTMENT (OUTPATIENT)
Dept: LAB | Facility: CLINIC | Age: 35
End: 2020-01-07
Payer: COMMERCIAL

## 2020-01-07 DIAGNOSIS — N13.39 OTHER HYDRONEPHROSIS: ICD-10-CM

## 2020-01-07 PROBLEM — N20.1 LEFT URETERAL STONE: Status: ACTIVE | Noted: 2020-01-07

## 2020-01-07 LAB
ANION GAP SERPL CALCULATED.3IONS-SCNC: 8 MMOL/L (ref 4–13)
BUN SERPL-MCNC: 10 MG/DL (ref 5–25)
CALCIUM SERPL-MCNC: 9.5 MG/DL (ref 8.3–10.1)
CHLORIDE SERPL-SCNC: 104 MMOL/L (ref 100–108)
CO2 SERPL-SCNC: 29 MMOL/L (ref 21–32)
CREAT SERPL-MCNC: 0.94 MG/DL (ref 0.6–1.3)
GFR SERPL CREATININE-BSD FRML MDRD: 91 ML/MIN/1.73SQ M
GLUCOSE P FAST SERPL-MCNC: 199 MG/DL (ref 65–99)
POTASSIUM SERPL-SCNC: 3.9 MMOL/L (ref 3.5–5.3)
SODIUM SERPL-SCNC: 141 MMOL/L (ref 136–145)

## 2020-01-07 PROCEDURE — 80048 BASIC METABOLIC PNL TOTAL CA: CPT

## 2020-01-07 PROCEDURE — 36415 COLL VENOUS BLD VENIPUNCTURE: CPT

## 2020-01-08 NOTE — ED PROVIDER NOTES
Pt Name: Pavel Obrien  MRN: [id number]  [place]: 1985  Age/Sex: 27 y o  female  Date of evaluation: 1/4/2020  PCP: Rain Brasher, [address] [place] [address]    Chief Complaint   Patient presents with    Abdominal Pain     pt  comes in c/o LLQ abdominal pain and left sided flank pain that started around 1pm yesterday afternoon  +nausea and vomiting  Pt  states she has urinary urgency but nothing comes out  Denies any chest pain   Flank Pain         HPI    Carlito Fairbanks presents to the Emergency Department complaining of Flank Pain          History provided by:  Patient   used: No    Flank Pain   Pain location:  L flank  Pain quality: aching    Pain radiates to:  LUQ  Pain severity:  Moderate  Timing:  Constant  Progression:  Waxing and waning  Chronicity:  New  Context: not alcohol use, not awakening from sleep, not diet changes, not eating, not laxative use, not medication withdrawal, not previous surgeries, not recent illness, not recent travel, not retching, not sick contacts, not suspicious food intake and not trauma    Relieved by:  Nothing  Worsened by:  Nothing  Ineffective treatments:  None tried  Associated symptoms: fatigue and nausea    Associated symptoms: no chest pain, no chills, no constipation, no cough, no diarrhea, no dysuria, no fever, no hematuria, no shortness of breath, no sore throat and no vomiting          Past Medical and Surgical History    Past Medical History:   Diagnosis Date    Acid reflux     Allergic     seasonal    Anxiety     Blurred vision     Fainting     Headache     Loss of appetite     Obesity     Spontaneous vaginal delivery     x3    Weakness        Past Surgical History:   Procedure Laterality Date    FL RETROGRADE PYELOGRAM  1/4/2020    NO PAST SURGERIES      SD CYSTO/URETERO W/LITHOTRIPSY &INDWELL STENT INSRT Left 1/4/2020    Procedure: CYSTOSCOPY URETEROSCOPY , RETROGRADE PYELOGRAM AND INSERTION STENT URETERAL;  Surgeon: Tisha An Ronna Michael MD;  Location: [facility];  Service: Urology       Family History   Problem Relation Age of Onset    Hypertension Mother     Breast cancer Maternal Grandmother     Diabetes Maternal Grandmother     Thyroid disease Other     Autism Other     Anemia Father     Leukemia Sister     Diabetes Paternal Grandmother     Fibroids Sister     No Known Problems Brother     No Known Problems Sister     No Known Problems Sister     Colon cancer Neg Hx     Cervical cancer Neg Hx     Stroke Neg Hx        Social History     Tobacco Use    Smoking status: Current Every Day Smoker     Packs/day: 0 25     Types: Cigarettes    Smokeless tobacco: Never Used    Tobacco comment: currently 3cigs/day   Substance Use Topics    Alcohol use: Not Currently     Frequency: Monthly or less     Comment: monthly    Drug use: No       Allergies    Allergies   Allergen Reactions    Other Itching     apples       Home Medications:    Prior to Admission medications    Medication Sig Start Date End Date Taking?  Authorizing Provider   phentermine (ADIPEX-P) 37 5 MG tablet Take 1/2 tablet daily in AM 12/30/19  Yes Daly Hernandez PA-C   ALPRAZolam Frantz Cora) 0 5 mg tablet Take 1 tablet (0 5 mg total) by mouth daily at bedtime as needed for anxiety  Patient not taking: Reported on 11/25/2019 8/7/19   Lacey England DO   buPROPion (WELLBUTRIN XL) 150 mg 24 hr tablet Take 1 tablet (150 mg total) by mouth daily  Patient not taking: Reported on 11/25/2019 8/7/19   Lacey England DO   oxybutynin (DITROPAN-XL) 5 mg 24 hr tablet Take 1 tablet (5 mg total) by mouth daily 1/5/20   Joselo Rutledge MD   oxyCODONE-acetaminophen (PERCOCET) 5-325 mg per tablet Take 1 tablet by mouth every 4 (four) hours as needed for moderate painMax Daily Amount: 6 tablets 1/4/20   Joselo Rutledge MD   varenicline (CHANTIX AYDE) 0 5 MG X 11 & 1 MG X 42 tablet Take one 0 5mg tab by mouth 1x daily for 3 days, then increase to one 0 5mg tab 2x daily for 3 days, then increase to one 1mg tab 2x daily  Patient not taking: Reported on 1/4/2020 12/3/19   Moreno Prince DO           Review of Systems    Review of Systems   Constitutional: Positive for fatigue  Negative for activity change, appetite change, chills, diaphoresis and fever  HENT: Negative for congestion, drooling, ear discharge, ear pain, facial swelling, postnasal drip, rhinorrhea, sinus pressure, sinus pain, sore throat, trouble swallowing and voice change  Eyes: Negative for discharge  Respiratory: Negative for apnea, cough, choking, chest tightness, shortness of breath, wheezing and stridor  Cardiovascular: Negative for chest pain  Gastrointestinal: Positive for abdominal pain and nausea  Negative for abdominal distention, constipation, diarrhea and vomiting  Genitourinary: Positive for flank pain  Negative for decreased urine volume, difficulty urinating, dysuria, frequency and hematuria  Musculoskeletal: Negative for arthralgias and joint swelling  Skin: Negative for rash  Neurological: Negative for dizziness, light-headedness and headaches  Psychiatric/Behavioral: The patient is not nervous/anxious  All other systems reviewed and are negative  All other systems reviewed and negative  Physical Exam      ED Triage Vitals   Temperature Pulse Respirations Blood Pressure SpO2   01/04/20 0124 01/04/20 0124 01/04/20 0124 01/04/20 0124 01/04/20 0126   98 4 °F (36 9 °C) 75 20 123/90 100 %      Temp Source Heart Rate Source Patient Position - Orthostatic VS BP Location FiO2 (%)   01/04/20 0124 01/04/20 0124 01/04/20 0124 01/04/20 0124 --   Oral Monitor Lying Right arm       Pain Score       01/04/20 0750       8               Physical Exam   Constitutional: She is oriented to person, place, and time  She appears well-developed and well-nourished  Non-toxic appearance  She does not appear ill  No distress  HENT:   Head: Normocephalic and atraumatic     Mouth/Throat: Oropharynx is clear and moist    Eyes: Pupils are equal, round, and reactive to light  Conjunctivae and EOM are normal    Neck: Normal range of motion  Neck supple  Cardiovascular: Normal rate, regular rhythm, normal heart sounds and intact distal pulses  Exam reveals no gallop and no friction rub  No murmur heard  Pulmonary/Chest: Effort normal and breath sounds normal  No stridor  No respiratory distress  She has no wheezes  She has no rales  She exhibits no tenderness  Abdominal: Soft  Bowel sounds are normal  She exhibits no distension and no mass  There is no hepatosplenomegaly  There is no tenderness  There is CVA tenderness (L)  There is no rigidity, no rebound, no guarding, no tenderness at McBurney's point and negative Araujo's sign  No hernia  Negative Araujo's  Negative Appendiceal signs (Psoas, Rovsing's, Obturator)  Negative Peritoneal Signs   Musculoskeletal: Normal range of motion  Neurological: She is alert and oriented to person, place, and time  Skin: Skin is warm and dry  Capillary refill takes less than 2 seconds  She is not diaphoretic  Nursing note and vitals reviewed            Diagnostic Results      Labs:    Results for orders placed or performed during the hospital encounter of 01/04/20   CBC and differential   Result Value Ref Range    WBC 12 22 (H) 4 31 - 10 16 Thousand/uL    RBC 4 60 3 81 - 5 12 Million/uL    Hemoglobin 13 9 11 5 - 15 4 g/dL    Hematocrit 40 8 34 8 - 46 1 %    MCV 89 82 - 98 fL    MCH 30 2 26 8 - 34 3 pg    MCHC 34 1 31 4 - 37 4 g/dL    RDW 12 6 11 6 - 15 1 %    MPV 9 1 8 9 - 12 7 fL    Platelets 077 (H) 774 - 390 Thousands/uL    nRBC 0 /100 WBCs    Neutrophils Relative 67 43 - 75 %    Immat GRANS % 0 0 - 2 %    Lymphocytes Relative 24 14 - 44 %    Monocytes Relative 8 4 - 12 %    Eosinophils Relative 1 0 - 6 %    Basophils Relative 0 0 - 1 %    Neutrophils Absolute 8 16 (H) 1 85 - 7 62 Thousands/µL    Immature Grans Absolute 0 04 0 00 - 0 20 Thousand/uL    Lymphocytes Absolute 2  87 0 60 - 4 47 Thousands/µL    Monocytes Absolute 1 01 0 17 - 1 22 Thousand/µL    Eosinophils Absolute 0 09 0 00 - 0 61 Thousand/µL    Basophils Absolute 0 05 0 00 - 0 10 Thousands/µL   Comprehensive metabolic panel   Result Value Ref Range    Sodium 134 (L) 136 - 145 mmol/L    Potassium 3 9 3 5 - 5 3 mmol/L    Chloride 99 (L) 100 - 108 mmol/L    CO2 23 21 - 32 mmol/L    ANION GAP 12 4 - 13 mmol/L    BUN 10 5 - 25 mg/dL    Creatinine 1 50 (H) 0 60 - 1 30 mg/dL    Glucose 110 65 - 140 mg/dL    Calcium 10 0 8 3 - 10 1 mg/dL    AST 52 (H) 5 - 45 U/L    ALT 62 12 - 78 U/L    Alkaline Phosphatase 294 (H) 46 - 116 U/L    Total Protein 9 1 (H) 6 4 - 8 2 g/dL    Albumin 3 5 3 5 - 5 0 g/dL    Total Bilirubin 0 85 0 20 - 1 00 mg/dL    eGFR 52 ml/min/1 73sq m   Lipase   Result Value Ref Range    Lipase 73 73 - 393 u/L   Urine Microscopic   Result Value Ref Range    RBC, UA 1-2 (A) None Seen, 0-5 /hpf    WBC, UA 1-2 (A) None Seen, 0-5, 5-55, 5-65 /hpf    Epithelial Cells Occasional None Seen, Occasional /hpf    Bacteria, UA Occasional None Seen, Occasional /hpf    MUCUS THREADS Occasional (A) None Seen   Basic metabolic panel   Result Value Ref Range    Sodium 139 136 - 145 mmol/L    Potassium 3 6 3 5 - 5 3 mmol/L    Chloride 105 100 - 108 mmol/L    CO2 26 21 - 32 mmol/L    ANION GAP 8 4 - 13 mmol/L    BUN 9 5 - 25 mg/dL    Creatinine 1 41 (H) 0 60 - 1 30 mg/dL    Glucose 106 65 - 140 mg/dL    Calcium 8 7 8 3 - 10 1 mg/dL    eGFR 56 ml/min/1 73sq m   CBC (With Platelets)   Result Value Ref Range    WBC 9 92 4 31 - 10 16 Thousand/uL    RBC 3 92 3 81 - 5 12 Million/uL    Hemoglobin 11 9 11 5 - 15 4 g/dL    Hematocrit 36 1 34 8 - 46 1 %    MCV 92 82 - 98 fL    MCH 30 4 26 8 - 34 3 pg    MCHC 33 0 31 4 - 37 4 g/dL    RDW 12 9 11 6 - 15 1 %    Platelets 702 271 - 215 Thousands/uL    MPV 8 9 8 9 - 12 7 fL   Basic metabolic panel   Result Value Ref Range    Sodium 139 136 - 145 mmol/L    Potassium 3 8 3 5 - 5 3 mmol/L    Chloride 105 100 - 108 mmol/L    CO2 25 21 - 32 mmol/L    ANION GAP 9 4 - 13 mmol/L    BUN 9 5 - 25 mg/dL    Creatinine 1 44 (H) 0 60 - 1 30 mg/dL    Glucose 186 (H) 65 - 140 mg/dL    Glucose, Fasting 186 (H) 65 - 99 mg/dL    Calcium 8 9 8 3 - 10 1 mg/dL    eGFR 54 ml/min/1 73sq m   POCT pregnancy, urine   Result Value Ref Range    EXT PREG TEST UR (Ref: Negative) NEGATIVE     Control VALID    Urine Macroscopic, POC   Result Value Ref Range    Color, UA Yellow     Clarity, UA Clear     pH, UA 5 5 4 5 - 8 0    Leukocytes, UA Negative Negative    Nitrite, UA Negative Negative    Protein, UA Negative Negative mg/dl    Glucose, UA Negative Negative mg/dl    Ketones, UA Trace (A) Negative mg/dl    Urobilinogen, UA 1 0 0 2, 1 0 E U /dl E U /dl    Bilirubin, UA Negative Negative    Blood, UA Small (A) Negative    Specific Gravity, UA 1 025 1 003 - 1 030       All labs reviewed and utilized in the medical decision making process    Radiology:    FL retrograde pyelogram   Final Result   Fluoroscopic guidance provided for left retrograde pyelogram  Please see procedure report for further details  Workstation performed: [id number]         CT renal stone study abdomen pelvis wo contrast   Final Result      Mild to moderate left-sided hydronephrosis and perinephric stranding, the cause of which appears to be a 7 mm calculus at the UPJ  Evaluation of the renal parenchyma was limited by the lack of intravenous contrast and therefore pyelonephritis cannot be excluded on this study  Correlation with a urinalysis is recommended  Multiple prominent but nonenlarged left para-aortic lymph nodes which may be reactive in nature  No prior studies are available for comparison  A repeat CT abdomen/pelvis in 1 month after the initiation of intravenous contrast is recommended for    further evaluation        Workstation performed: [id number]             All radiology studies independently viewed by me and interpreted by the radiologist     Procedure    Procedures      Assessment and Plan    MDM  Number of Diagnoses or Management Options  Lymphadenopathy: new, needed workup  Ureteral calculus, left: new, needed workup     Amount and/or Complexity of Data Reviewed  Clinical lab tests: ordered and reviewed  Tests in the radiology section of CPT®: ordered and reviewed  Tests in the medicine section of CPT®: reviewed and ordered  Review and summarize past medical records: yes  Discuss the patient with other providers: yes  Independent visualization of images, tracings, or specimens: yes    Risk of Complications, Morbidity, and/or Mortality  Presenting problems: moderate  Diagnostic procedures: moderate  Management options: moderate    Patient Progress  Patient progress: improved      Initial ED assessment:  Nancie Campos is a 27 y o  female with no significant PMH who presents with L flank pain  Vitals signs reviewed  Physical examination remarkable for LCVAT  Initial Ddx  includes but is not limited to:   renal colic, pyelonephritis, UTI, GI etiology, appendicitis, mesenteric adenitis, diverticulitis, pancreatitis, cholecystitis, biliary colic, AAA, rhabdomyolysis, tumor  Initial ED plan:   Plan will be to perform diagnostic testing of CBC, CMP, Lipase, CT renal stone study and treat symptomatically with IVF+analgesia  Final ED summary/disposition: Discussed course of care with patient, who is agreeable to admission for further eval, treatment  Called inpatient  PA-C/Hospitalist who is aware of the patient, case discussed including HPI, pertinent PMH, ED Course, and workup   Hospitalist agreed with plan and will accept for admission/observation under the medicine service      MDM  Reviewed: previous chart, nursing note and vitals  Interpretation: labs and CT scan        ED Course of Care and Re-Assessments    ED Course as of Jan 08 0034   Sat Jan 04, 2020   0203 Creatinine(!): 1 50   0123 Spoke with Dr Steve Riley, recommendations NPO, fluids, admit SLIM  Medications   ondansetron (ZOFRAN) injection 4 mg (4 mg Intravenous Given 1/4/20 0206)   ketorolac (TORADOL) injection 15 mg (15 mg Intravenous Given 1/4/20 0208)   tamsulosin (FLOMAX) capsule 0 4 mg (0 4 mg Oral Given 1/4/20 0402)   sodium chloride 0 9 % bolus 1,000 mL (1,000 mL Intravenous New Bag 1/4/20 0663)         FINAL IMPRESSION    Final diagnoses:   Ureteral calculus, left - mild to moderate hydro   Lymphadenopathy - Per CT read: Multiple prominent but nonenlarged left para-aortic lymph nodes which may be reactive in nature  DISPOSITION/PLAN  Time reflects when diagnosis was documented in both MDM as applicable and the Disposition within this note     Time User Action Codes Description Comment    1/4/2020  3:34 AM Guzmán Rast Add [N20 1] Ureteral calculus, left     1/4/2020  3:34 AM Guzmán Rast Modify [N20 1] Ureteral calculus, left mild to moderate hydro    1/4/2020  3:34 AM Toni Jones Add [R59 1] Lymphadenopathy     1/4/2020  3:35 AM Guzmán Rast Modify [R59 1] Lymphadenopathy Per CT read: Multiple prominent but nonenlarged left para-aortic lymph nodes which may be reactive in nature  1/4/2020  4:08 AM Mentone Medicus Add [Q62 11] Hydronephrosis with ureteropelvic junction (UPJ) obstruction     1/4/2020  7:49 AM Sofia Zhang Add [F41 8] Anxiety with depression     1/4/2020  2:09 PM Michelle Bougie A Add [N20 0] Kidney stone on left side       ED Disposition     ED Disposition Condition Date/Time Comment    Admit Stable Sat Jan 4, 2020  3:33 AM Case was discussed with [facility] Hospitalist and the patient's admission status was agreed to be Admission Status: observation status to the service of Dr Tenzin Parikh           Follow-up Information     Follow up With Specialties Details Why [address] [place] [address], DO Family Medicine Schedule an appointment as soon as possible for a visit in 1 week(s)  [address] [address]  [phone]      Divina Pate MD Urology Schedule an appointment as soon as possible for a visit in 1 week(s)  [facility] [address]  [phone]                PATIENT REFERRED TO:    Osvaldo Balderas DO  [address]  [facility] [place] [address] [facility] [address]  [phone]    Schedule an appointment as soon as possible for a visit in 1 week(s)      Divina Pate MD  [facility] [address] [address]  [phone]    Schedule an appointment as soon as possible for a visit in 1 week(s)        DISCHARGE MEDICATIONS:    Discharge Medication List as of 1/4/2020  4:36 PM      START taking these medications    Details   oxybutynin (DITROPAN-XL) 5 mg 24 hr tablet Take 1 tablet (5 mg total) by mouth daily, Starting Sun 1/5/2020, Normal      oxyCODONE-acetaminophen (PERCOCET) 5-325 mg per tablet Take 1 tablet by mouth every 4 (four) hours as needed for moderate painMax Daily Amount: 6 tablets, Starting Sat 1/4/2020, Normal      phenazopyridine (PYRIDIUM) 100 mg tablet Take 1 tablet (100 mg total) by mouth 3 (three) times a day with meals for 5 doses, Starting Sat 1/4/2020, Until Mon 1/6/2020, Normal         CONTINUE these medications which have NOT CHANGED    Details   phentermine (ADIPEX-P) 37 5 MG tablet Take 1/2 tablet daily in AM, Normal      ALPRAZolam (XANAX) 0 5 mg tablet Take 1 tablet (0 5 mg total) by mouth daily at bedtime as needed for anxiety, Starting Wed 8/7/2019, Normal      buPROPion (WELLBUTRIN XL) 150 mg 24 hr tablet Take 1 tablet (150 mg total) by mouth daily, Starting Wed 8/7/2019, Normal      varenicline (CHANTIX AYDE) 0 5 MG X 11 & 1 MG X 42 tablet Take one 0 5mg tab by mouth 1x daily for 3 days, then increase to one 0 5mg tab 2x daily for 3 days, then increase to one 1mg tab 2x daily, Normal             Outpatient Discharge Orders   Discharge Diet     Activity as tolerated            LINDA Barfield PA-C  01/08/20 [facility]

## 2020-01-08 NOTE — PRE-PROCEDURE INSTRUCTIONS
Pre-Surgery Instructions:   Medication Instructions    oxybutynin (DITROPAN-XL) 5 mg 24 hr tablet Instructed patient per Anesthesia Guidelines   oxyCODONE-acetaminophen (PERCOCET) 5-325 mg per tablet Instructed patient per Anesthesia Guidelines      Pre-op medication, and showering instructions with antibacteral soap reviewed

## 2020-01-09 ENCOUNTER — ANESTHESIA EVENT (OUTPATIENT)
Dept: PERIOP | Facility: AMBULARY SURGERY CENTER | Age: 35
End: 2020-01-09
Payer: COMMERCIAL

## 2020-01-10 ENCOUNTER — APPOINTMENT (OUTPATIENT)
Dept: LAB | Facility: CLINIC | Age: 35
End: 2020-01-10
Payer: COMMERCIAL

## 2020-01-10 DIAGNOSIS — N20.1 LEFT URETERAL STONE: ICD-10-CM

## 2020-01-10 PROCEDURE — 87086 URINE CULTURE/COLONY COUNT: CPT

## 2020-01-11 LAB — BACTERIA UR CULT: NORMAL

## 2020-01-20 ENCOUNTER — TELEPHONE (OUTPATIENT)
Dept: UROLOGY | Facility: HOSPITAL | Age: 35
End: 2020-01-20

## 2020-01-20 DIAGNOSIS — N20.0 NEPHROLITHIASIS: Primary | ICD-10-CM

## 2020-01-22 ENCOUNTER — HOSPITAL ENCOUNTER (OUTPATIENT)
Facility: AMBULARY SURGERY CENTER | Age: 35
Setting detail: OUTPATIENT SURGERY
Discharge: HOME | End: 2020-01-22
Attending: UROLOGY | Admitting: UROLOGY
Payer: COMMERCIAL

## 2020-01-22 ENCOUNTER — APPOINTMENT (OUTPATIENT)
Dept: RADIOLOGY | Facility: AMBULARY SURGERY CENTER | Age: 35
End: 2020-01-22
Payer: COMMERCIAL

## 2020-01-22 ENCOUNTER — ANESTHESIA (OUTPATIENT)
Dept: PERIOP | Facility: AMBULARY SURGERY CENTER | Age: 35
End: 2020-01-22
Payer: COMMERCIAL

## 2020-01-22 VITALS
HEART RATE: 77 BPM | HEIGHT: 61 IN | SYSTOLIC BLOOD PRESSURE: 103 MMHG | DIASTOLIC BLOOD PRESSURE: 61 MMHG | RESPIRATION RATE: 18 BRPM | WEIGHT: 181 LBS | TEMPERATURE: 98.2 F | BODY MASS INDEX: 34.17 KG/M2 | OXYGEN SATURATION: 95 %

## 2020-01-22 DIAGNOSIS — N20.1 LEFT URETERAL STONE: ICD-10-CM

## 2020-01-22 DIAGNOSIS — N20.0 KIDNEY STONE ON LEFT SIDE: ICD-10-CM

## 2020-01-22 DIAGNOSIS — E66.9 CLASS 1 OBESITY: ICD-10-CM

## 2020-01-22 PROCEDURE — C1758 CATHETER, URETERAL: HCPCS | Performed by: UROLOGY

## 2020-01-22 PROCEDURE — 52356 CYSTO/URETERO W/LITHOTRIPSY: CPT | Performed by: UROLOGY

## 2020-01-22 PROCEDURE — C1769 GUIDE WIRE: HCPCS | Performed by: UROLOGY

## 2020-01-22 PROCEDURE — 74420 UROGRAPHY RTRGR +-KUB: CPT

## 2020-01-22 PROCEDURE — 88300 SURGICAL PATH GROSS: CPT | Performed by: PATHOLOGY

## 2020-01-22 PROCEDURE — 82360 CALCULUS ASSAY QUANT: CPT | Performed by: UROLOGY

## 2020-01-22 PROCEDURE — C2617 STENT, NON-COR, TEM W/O DEL: HCPCS | Performed by: UROLOGY

## 2020-01-22 DEVICE — STENT URETERAL 6FR 24CML INLAY OPTIMA: Type: IMPLANTABLE DEVICE | Site: URETER | Status: FUNCTIONAL

## 2020-01-22 RX ORDER — FENTANYL CITRATE/PF 50 MCG/ML
50 SYRINGE (ML) INJECTION
Status: COMPLETED | OUTPATIENT
Start: 2020-01-22 | End: 2020-01-22

## 2020-01-22 RX ORDER — HYDROCODONE BITARTRATE AND ACETAMINOPHEN 5; 325 MG/1; MG/1
1 TABLET ORAL EVERY 4 HOURS PRN
Status: DISCONTINUED | OUTPATIENT
Start: 2020-01-22 | End: 2020-01-22 | Stop reason: HOSPADM

## 2020-01-22 RX ORDER — ONDANSETRON 2 MG/ML
4 INJECTION INTRAMUSCULAR; INTRAVENOUS ONCE AS NEEDED
Status: DISCONTINUED | OUTPATIENT
Start: 2020-01-22 | End: 2020-01-22 | Stop reason: HOSPADM

## 2020-01-22 RX ORDER — HYDROCODONE BITARTRATE AND ACETAMINOPHEN 5; 325 MG/1; MG/1
1 TABLET ORAL EVERY 4 HOURS PRN
Qty: 20 TABLET | Refills: 0 | Status: SHIPPED | OUTPATIENT
Start: 2020-01-22 | End: 2020-02-01

## 2020-01-22 RX ORDER — METOCLOPRAMIDE HYDROCHLORIDE 5 MG/ML
10 INJECTION INTRAMUSCULAR; INTRAVENOUS ONCE AS NEEDED
Status: DISCONTINUED | OUTPATIENT
Start: 2020-01-22 | End: 2020-01-22 | Stop reason: HOSPADM

## 2020-01-22 RX ORDER — KETOROLAC TROMETHAMINE 30 MG/ML
INJECTION, SOLUTION INTRAMUSCULAR; INTRAVENOUS AS NEEDED
Status: DISCONTINUED | OUTPATIENT
Start: 2020-01-22 | End: 2020-01-22 | Stop reason: SURG

## 2020-01-22 RX ORDER — MIDAZOLAM HYDROCHLORIDE 2 MG/2ML
INJECTION, SOLUTION INTRAMUSCULAR; INTRAVENOUS AS NEEDED
Status: DISCONTINUED | OUTPATIENT
Start: 2020-01-22 | End: 2020-01-22 | Stop reason: SURG

## 2020-01-22 RX ORDER — HYDROMORPHONE HCL/PF 1 MG/ML
0.5 SYRINGE (ML) INJECTION
Status: DISCONTINUED | OUTPATIENT
Start: 2020-01-22 | End: 2020-01-22 | Stop reason: HOSPADM

## 2020-01-22 RX ORDER — MAGNESIUM HYDROXIDE 1200 MG/15ML
LIQUID ORAL AS NEEDED
Status: DISCONTINUED | OUTPATIENT
Start: 2020-01-22 | End: 2020-01-22 | Stop reason: HOSPADM

## 2020-01-22 RX ORDER — DEXAMETHASONE SODIUM PHOSPHATE 4 MG/ML
INJECTION, SOLUTION INTRA-ARTICULAR; INTRALESIONAL; INTRAMUSCULAR; INTRAVENOUS; SOFT TISSUE AS NEEDED
Status: DISCONTINUED | OUTPATIENT
Start: 2020-01-22 | End: 2020-01-22 | Stop reason: SURG

## 2020-01-22 RX ORDER — CIPROFLOXACIN 500 MG/1
500 TABLET, FILM COATED ORAL 2 TIMES DAILY
Qty: 6 TABLET | Refills: 0 | Status: SHIPPED | OUTPATIENT
Start: 2020-01-22 | End: 2020-01-25

## 2020-01-22 RX ORDER — FENTANYL CITRATE 50 UG/ML
INJECTION, SOLUTION INTRAMUSCULAR; INTRAVENOUS AS NEEDED
Status: DISCONTINUED | OUTPATIENT
Start: 2020-01-22 | End: 2020-01-22 | Stop reason: SURG

## 2020-01-22 RX ORDER — DIPHENHYDRAMINE HYDROCHLORIDE 50 MG/ML
12.5 INJECTION INTRAMUSCULAR; INTRAVENOUS ONCE AS NEEDED
Status: DISCONTINUED | OUTPATIENT
Start: 2020-01-22 | End: 2020-01-22 | Stop reason: HOSPADM

## 2020-01-22 RX ORDER — ONDANSETRON 2 MG/ML
INJECTION INTRAMUSCULAR; INTRAVENOUS AS NEEDED
Status: DISCONTINUED | OUTPATIENT
Start: 2020-01-22 | End: 2020-01-22 | Stop reason: SURG

## 2020-01-22 RX ORDER — CEFAZOLIN SODIUM 2 G/50ML
2000 SOLUTION INTRAVENOUS ONCE
Status: DISCONTINUED | OUTPATIENT
Start: 2020-01-22 | End: 2020-01-22 | Stop reason: HOSPADM

## 2020-01-22 RX ORDER — EPHEDRINE SULFATE 50 MG/ML
INJECTION INTRAVENOUS AS NEEDED
Status: DISCONTINUED | OUTPATIENT
Start: 2020-01-22 | End: 2020-01-22 | Stop reason: SURG

## 2020-01-22 RX ORDER — HYDROMORPHONE HCL/PF 1 MG/ML
0.2 SYRINGE (ML) INJECTION
Status: DISCONTINUED | OUTPATIENT
Start: 2020-01-22 | End: 2020-01-22 | Stop reason: HOSPADM

## 2020-01-22 RX ORDER — SODIUM CHLORIDE, SODIUM LACTATE, POTASSIUM CHLORIDE, CALCIUM CHLORIDE 600; 310; 30; 20 MG/100ML; MG/100ML; MG/100ML; MG/100ML
125 INJECTION, SOLUTION INTRAVENOUS CONTINUOUS
Status: DISCONTINUED | OUTPATIENT
Start: 2020-01-22 | End: 2020-01-22 | Stop reason: HOSPADM

## 2020-01-22 RX ORDER — OXYBUTYNIN CHLORIDE 5 MG/1
5 TABLET ORAL 3 TIMES DAILY PRN
Qty: 90 TABLET | Refills: 0 | Status: SHIPPED | OUTPATIENT
Start: 2020-01-22 | End: 2020-04-22 | Stop reason: ALTCHOICE

## 2020-01-22 RX ORDER — CEFAZOLIN SODIUM 2 G/50ML
SOLUTION INTRAVENOUS AS NEEDED
Status: DISCONTINUED | OUTPATIENT
Start: 2020-01-22 | End: 2020-01-22 | Stop reason: SURG

## 2020-01-22 RX ORDER — LIDOCAINE HYDROCHLORIDE 10 MG/ML
INJECTION, SOLUTION EPIDURAL; INFILTRATION; INTRACAUDAL; PERINEURAL AS NEEDED
Status: DISCONTINUED | OUTPATIENT
Start: 2020-01-22 | End: 2020-01-22 | Stop reason: SURG

## 2020-01-22 RX ORDER — PROPOFOL 10 MG/ML
INJECTION, EMULSION INTRAVENOUS AS NEEDED
Status: DISCONTINUED | OUTPATIENT
Start: 2020-01-22 | End: 2020-01-22 | Stop reason: SURG

## 2020-01-22 RX ORDER — LIDOCAINE HYDROCHLORIDE 10 MG/ML
0.5 INJECTION, SOLUTION EPIDURAL; INFILTRATION; INTRACAUDAL; PERINEURAL ONCE AS NEEDED
Status: DISCONTINUED | OUTPATIENT
Start: 2020-01-22 | End: 2020-01-22 | Stop reason: HOSPADM

## 2020-01-22 RX ADMIN — ONDANSETRON 4 MG: 2 INJECTION INTRAMUSCULAR; INTRAVENOUS at 10:51

## 2020-01-22 RX ADMIN — FENTANYL CITRATE 50 MCG: 50 INJECTION, SOLUTION INTRAMUSCULAR; INTRAVENOUS at 11:41

## 2020-01-22 RX ADMIN — PHENYLEPHRINE HYDROCHLORIDE 100 MCG: 10 INJECTION INTRAVENOUS at 11:28

## 2020-01-22 RX ADMIN — DEXAMETHASONE SODIUM PHOSPHATE 4 MG: 4 INJECTION, SOLUTION INTRAMUSCULAR; INTRAVENOUS at 10:51

## 2020-01-22 RX ADMIN — FENTANYL CITRATE 50 MCG: 50 INJECTION, SOLUTION INTRAMUSCULAR; INTRAVENOUS at 10:55

## 2020-01-22 RX ADMIN — PHENYLEPHRINE HYDROCHLORIDE 100 MCG: 10 INJECTION INTRAVENOUS at 10:53

## 2020-01-22 RX ADMIN — PHENYLEPHRINE HYDROCHLORIDE 200 MCG: 10 INJECTION INTRAVENOUS at 11:21

## 2020-01-22 RX ADMIN — MIDAZOLAM HYDROCHLORIDE 2 MG: 1 INJECTION, SOLUTION INTRAMUSCULAR; INTRAVENOUS at 10:29

## 2020-01-22 RX ADMIN — HYDROCODONE BITARTRATE AND ACETAMINOPHEN 1 TABLET: 5; 325 TABLET ORAL at 13:09

## 2020-01-22 RX ADMIN — PHENYLEPHRINE HYDROCHLORIDE 100 MCG: 10 INJECTION INTRAVENOUS at 10:49

## 2020-01-22 RX ADMIN — FENTANYL CITRATE 50 MCG: 50 INJECTION, SOLUTION INTRAMUSCULAR; INTRAVENOUS at 10:43

## 2020-01-22 RX ADMIN — HYDROMORPHONE HYDROCHLORIDE 0.5 MG: 1 INJECTION, SOLUTION INTRAMUSCULAR; INTRAVENOUS; SUBCUTANEOUS at 12:19

## 2020-01-22 RX ADMIN — PHENYLEPHRINE HYDROCHLORIDE 200 MCG: 10 INJECTION INTRAVENOUS at 11:15

## 2020-01-22 RX ADMIN — SODIUM CHLORIDE, SODIUM LACTATE, POTASSIUM CHLORIDE, AND CALCIUM CHLORIDE: .6; .31; .03; .02 INJECTION, SOLUTION INTRAVENOUS at 10:39

## 2020-01-22 RX ADMIN — FENTANYL CITRATE 50 MCG: 50 INJECTION, SOLUTION INTRAMUSCULAR; INTRAVENOUS at 12:04

## 2020-01-22 RX ADMIN — PHENYLEPHRINE HYDROCHLORIDE 200 MCG: 10 INJECTION INTRAVENOUS at 11:35

## 2020-01-22 RX ADMIN — EPHEDRINE SULFATE 5 MG: 50 INJECTION, SOLUTION INTRAVENOUS at 10:53

## 2020-01-22 RX ADMIN — PHENYLEPHRINE HYDROCHLORIDE 100 MCG: 10 INJECTION INTRAVENOUS at 11:10

## 2020-01-22 RX ADMIN — PHENYLEPHRINE HYDROCHLORIDE 100 MCG: 10 INJECTION INTRAVENOUS at 11:12

## 2020-01-22 RX ADMIN — LIDOCAINE HYDROCHLORIDE 50 MG: 10 INJECTION, SOLUTION EPIDURAL; INFILTRATION; INTRACAUDAL; PERINEURAL at 10:43

## 2020-01-22 RX ADMIN — PROPOFOL 200 MG: 10 INJECTION, EMULSION INTRAVENOUS at 10:43

## 2020-01-22 RX ADMIN — FENTANYL CITRATE 50 MCG: 50 INJECTION, SOLUTION INTRAMUSCULAR; INTRAVENOUS at 11:57

## 2020-01-22 RX ADMIN — FENTANYL CITRATE 50 MCG: 50 INJECTION, SOLUTION INTRAMUSCULAR; INTRAVENOUS at 12:10

## 2020-01-22 RX ADMIN — KETOROLAC TROMETHAMINE 30 MG: 30 INJECTION, SOLUTION INTRAMUSCULAR at 11:39

## 2020-01-22 RX ADMIN — PROPOFOL 40 MG: 10 INJECTION, EMULSION INTRAVENOUS at 11:11

## 2020-01-22 RX ADMIN — FENTANYL CITRATE 50 MCG: 50 INJECTION, SOLUTION INTRAMUSCULAR; INTRAVENOUS at 10:48

## 2020-01-22 RX ADMIN — CEFAZOLIN SODIUM 2000 MG: 2 SOLUTION INTRAVENOUS at 10:30

## 2020-01-22 NOTE — ANESTHESIA PREPROCEDURE EVALUATION
Review of Systems/Medical History  Patient summary reviewed  Chart reviewed      Cardiovascular  Negative cardio ROS    Pulmonary  Smoker , Tobacco cessation counseling given ,        GI/Hepatic    GERD ,        Kidney stones,        Endo/Other    Obesity    GYN  Negative gynecology ROS          Hematology  Negative hematology ROS      Musculoskeletal  Negative musculoskeletal ROS        Neurology    Headaches,    Psychology   Anxiety, Depression ,              Physical Exam    Airway    Mallampati score: II  TM Distance: >3 FB  Neck ROM: full     Dental   No notable dental hx     Cardiovascular  Comment: Negative ROS, Rhythm: regular, Rate: normal, Cardiovascular exam normal    Pulmonary  Pulmonary exam normal Breath sounds clear to auscultation,     Other Findings        Anesthesia Plan  ASA Score- 2     Anesthesia Type- general with ASA Monitors  Additional Monitors:   Airway Plan: LMA  Plan Factors-    Induction- intravenous  Postoperative Plan- Plan for postoperative opioid use  Informed Consent- Anesthetic plan and risks discussed with patient  I personally reviewed this patient with the CRNA  Discussed and agreed on the Anesthesia Plan with the CRNA  Tapan Rivera Recent labs personally reviewed:  Lab Results   Component Value Date    WBC 9 92 01/04/2020    HGB 11 9 01/04/2020     01/04/2020     Lab Results   Component Value Date    K 3 9 01/07/2020    BUN 10 01/07/2020    CREATININE 0 94 01/07/2020     I, Heydi Steven MD, have personally seen and evaluated the patient prior to anesthetic care  I have reviewed the pre-anesthetic record, and other medical records if appropriate to the anesthetic care  If a CRNA is involved in the case, I have reviewed the CRNA assessment, if present, and agree  Risks/benefits and alternatives discussed with patient including possible PONV, sore throat, and possibility of rare anesthetic and surgical emergencies

## 2020-01-22 NOTE — ANESTHESIA POSTPROCEDURE EVALUATION
Post-Op Assessment Note    CV Status:  Stable  Pain Score: 0    Pain management: adequate     Mental Status:  Alert and awake   Hydration Status:  Euvolemic   PONV Controlled:  Controlled   Airway Patency:  Patent   Post Op Vitals Reviewed: Yes      Staff: CRNA, Anesthesiologist           BP (P) 144/84 (01/22/20 1148)    Temp (P) 97 8 °F (36 6 °C) (01/22/20 1148)    Pulse (P) 92 (01/22/20 1148)   Resp (P) 16 (01/22/20 1148)    SpO2 (P) 100 % (01/22/20 1148)

## 2020-01-22 NOTE — INTERVAL H&P NOTE
H&P reviewed  After examining the patient I find no changes in the patients condition since the H&P had been written      Vitals:    01/22/20 0957   BP: 111/58   Pulse: 72   Resp: 16   Temp: 97 6 °F (36 4 °C)   SpO2: 98%

## 2020-01-22 NOTE — OP NOTE
OPERATIVE REPORT  PATIENT NAME: Savanna Jarrett    :  1985  MRN: [id number]  Pt Location: [facility] OR [facility]OM 05    SURGERY DATE: 2020    Surgeon(s) and Role:     Reinier Nava MD - Primary    Preop Diagnosis:  Left ureteral stone [N20 1]    Post-Op Diagnosis Codes:     * Left ureteral stone [N20 1]    Procedure(s) (LRB):  CYSTOSCOPY URETEROSCOPY WITH LITHOTRIPSY HOLMIUM LASER, RETROGRADE PYELOGRAM AND INSERTION STENT URETERAL (Left)    Specimen(s):  ID Type Source Tests Collected by Time Destination   1 :  Calculus Kidney, Left STONE ANALYSIS, TISSUE EXAM Porter Wilburn MD 2020 [id number]        Estimated Blood Loss:   Minimal    Drains:  Ureteral Drain/Stent Left ureter 6 Fr  (Active)   Number of days: 0       Anesthesia Type:   General    Operative Indications:  Left ureteral stone [N20 1]      Operative Findings:  7 mm stone found in the lower pole  I was unable to find the other smaller intrarenal calculi that were seen on CT  They may be intraparenchymal     Complications:   None    Procedure and Technique:  After informed consent was obtained the patient was brought to the operating room  Preoperative antibiotics were given for infection prophylaxis  Bilateral sequential compressive devices were placed on the lower extremities for DVT prophylaxis  A timeout was performed to identify the patient, surgery to be performed, and correct laterality  The patient was then prepped and draped in standard sterile fashion in the dorsal lithotomy position  A 22 Frisian rigid cystoscope was inserted per urethra and into the bladder  A diagnostic cystoscopy was performed that demonstrated no urothelial lesions  The left Ureteral stent was identified and grasped with a grasper  The stent was then presented out through the meatus and a sensor guidewire was placed through the stent and into the renal pelvis under fluoroscopic guidance  The stent was removed and found to be completely intact      [address] dual-lumen catheter was then placed over the guidewire and a second guidewire was placed into the renal pelvis  A  12-14 ureteral access sheath was then placed over one of the guidewires under fluoroscopic guidance  An 8 5 Greek flexible ureteroscope was then placed through the access sheath  All calyces were inspected and a 7 mm stone was encountered in the lower pole  The stone was fragmented with a holmium laser  A retrograde pyelogram was performed and all calyces were reinspected to ensure no large fragments were left  All of the fragments were grasped with a 0 tip nitinol basket and removed    The ureteroscope and access sheath were backed out of the ureter and the entire length of the ureter was examined on our way out  No large stone fragments were noted  There was some fat seen at the area of the UPJ which was tight  Because of this we decided to leave a stent without the string to let this area heal     A 5 Greek open-ended ureteral catheter was then placed over the remaining guidewire and a retrograde pyelogram was performed that demonstrated no hydronephrosis and no filling defects  The wire was then replaced and the open-ended ureteral catheter was removed  A 6X24 stent was placed under both visual and fluoroscopic guidance over the wire  Image of the stent coiled within the renal pelvis and bladder were saved for the medical record  No string was left on the stent  The patient was awoken and taken to the postanesthesia care unit in stable condition           I was present for the entire procedure    Patient Disposition:  PACU     SIGNATURE: Kalin Rivera MD  DATE: January 22, 2020  TIME: 11:41 AM

## 2020-01-22 NOTE — DISCHARGE INSTRUCTIONS
CYSTOSCOPY, TURBT, PROSTATE BIOPSY, BLADDER BIOPSY   DISCHARGE INSTRUCTIONS    Care after your surgery:  1  You may have burning or red colored urine the first 24 hours  Your burning should  stop in 24 hours and your urine should clear in 24-48 hours  2  You should drink more fluids unless Dr Bassam Adams advises you not to    3  You should return to normal activities when your urine is clear again  4  You may have a small amount of red drainage from your rectum if you had a prostate  biopsy performed  This should stop in 24 hours  5  Take pain medication as prescribed by your doctor  Call Dr Bassam Adams if you have any of the followin  You can not urinate or you have active or persistent bleeding, clots, back pain, or  pain when you urinate  2  You have chills, fever above 101 degrees, or feel sick  3  You have persistent nausea or vomiting, persistent dizziness, or lightheadedness  4  Call Dr Bassam Adams if you have any questions or concerns about your procedure at:  [phone]      The office will contact you to schedule cystoscopy and stent removal in the office

## 2020-01-23 ENCOUNTER — TELEPHONE (OUTPATIENT)
Dept: UROLOGY | Facility: HOSPITAL | Age: 35
End: 2020-01-23

## 2020-01-23 DIAGNOSIS — N20.0 NEPHROLITHIASIS: Primary | ICD-10-CM

## 2020-01-23 NOTE — TELEPHONE ENCOUNTER
Post Op Note    Christa Cao is a 27 y o  female s/p Cysto/ Stent performed 02/22/2020  Christa Cao is a patient of Dr Brittny Dowling and is seen at the [facility] office  How would you rate your pain on a scale from 1 to 10, 10 being the worst pain ever? 8  Have you had a fever? No  Have your bowel movements been regular? No  Do you have any difficulty urinating? Yes has some bleeding and burning  Do you have any other questions or concerns that I can address at this time?  Discussed Cysto stent removal and scheduled follow up appointments

## 2020-02-05 LAB
CA PHOS MFR STONE: 10 %
COLOR STONE: NORMAL
COM MFR STONE: 90 %
COMMENT-STONE3: NORMAL
COMPOSITION: NORMAL
LABORATORY COMMENT REPORT: NORMAL
NIDUS STONE QL: NORMAL
PHOTO: NORMAL
SIZE STONE: NORMAL MM
STONE ANALYSIS-IMP: NORMAL
STONE ANALYSIS-IMP: NORMAL
WT STONE: 94.1 MG

## 2020-02-06 DIAGNOSIS — F17.200 CURRENT EVERY DAY SMOKER: ICD-10-CM

## 2020-02-11 RX ORDER — VARENICLINE TARTRATE 25 MG
KIT ORAL
Qty: 53 TABLET | Refills: 0 | Status: SHIPPED | OUTPATIENT
Start: 2020-02-11 | End: 2021-03-31

## 2020-02-24 NOTE — PROGRESS NOTES
Cystoscopy  Date/Time: 2/26/2020 11:57 AM  Performed by: Verlie Nissen, PA-C  Authorized by: Verlie Nissen, PA-C     Procedure details: unilateral ureteral stent    Patient tolerance: Patient tolerated the procedure well with no immediate complications    Additional Procedure Details: The patient returns to the office today to undergo cystoscopy with left stent removal  Risk and benefits of the procedure were discussed and informed consent was obtained  The patient was placed in the modified supine position  The genitalia were prepped and draped in a sterile fashion  Viscous lidocaine was used for local anesthesia  The flexible cystoscope was passed  The bladder was inspected  The stent was identified coming from the left ureteral orifice  The stent was grasped with a flexible grasper and was then removed in its entirety without complications  Overall the patient tolerated the procedure  The patient was provided with a 3 day prescription of ciprofloxacin for infection prophylaxis following the procedure  They were made aware to advise our office of any fevers greater than 101 degrees Fahrenheit, malaise, or chills  They were advised that it is normal to have cramping pain on the ipsilateral side for a day or so after ureteral stent removal   They are to remain well hydrated in the coming days

## 2020-02-26 ENCOUNTER — PROCEDURE VISIT (OUTPATIENT)
Dept: UROLOGY | Facility: CLINIC | Age: 35
End: 2020-02-26
Payer: COMMERCIAL

## 2020-02-26 VITALS
WEIGHT: 178 LBS | HEIGHT: 61 IN | HEART RATE: 78 BPM | SYSTOLIC BLOOD PRESSURE: 118 MMHG | DIASTOLIC BLOOD PRESSURE: 80 MMHG | BODY MASS INDEX: 33.61 KG/M2

## 2020-02-26 DIAGNOSIS — N20.0 NEPHROLITHIASIS: Primary | ICD-10-CM

## 2020-02-26 LAB
SL AMB  POCT GLUCOSE, UA: NORMAL
SL AMB LEUKOCYTE ESTERASE,UA: NORMAL
SL AMB POCT BILIRUBIN,UA: NORMAL
SL AMB POCT BLOOD,UA: NORMAL
SL AMB POCT CLARITY,UA: CLEAR
SL AMB POCT COLOR,UA: YELLOW
SL AMB POCT KETONES,UA: NORMAL
SL AMB POCT NITRITE,UA: NORMAL
SL AMB POCT PH,UA: 6
SL AMB POCT SPECIFIC GRAVITY,UA: 1.01
SL AMB POCT URINE PROTEIN: NORMAL
SL AMB POCT UROBILINOGEN: NORMAL

## 2020-02-26 PROCEDURE — 81002 URINALYSIS NONAUTO W/O SCOPE: CPT | Performed by: PHYSICIAN ASSISTANT

## 2020-02-26 PROCEDURE — 52310 CYSTOSCOPY AND TREATMENT: CPT | Performed by: PHYSICIAN ASSISTANT

## 2020-02-26 RX ORDER — CIPROFLOXACIN 500 MG/1
500 TABLET, FILM COATED ORAL EVERY 12 HOURS SCHEDULED
Qty: 6 TABLET | Refills: 0 | Status: SHIPPED | OUTPATIENT
Start: 2020-02-26 | End: 2020-02-29

## 2020-03-19 ENCOUNTER — TELEPHONE (OUTPATIENT)
Dept: UROLOGY | Facility: CLINIC | Age: 35
End: 2020-03-19

## 2020-03-19 NOTE — TELEPHONE ENCOUNTER
Patient has not yet had follow-up imaging performed in preparation for scheduled appointment 03/25/2020  I am happy to review the imaging and let her know the results over the phone due to COVID-19 concerns  This appointment can be canceled if patient plans to have imaging performed  She should then plan to return in 1 year with ultrasound prior    Orders have been entered for this test

## 2020-03-19 NOTE — TELEPHONE ENCOUNTER
CALLED PT AND SHE SAID SHE WILL R/S US AND LET US KNOW WHEN THAT WAS DONE SO PROVIDER CAN REVIEW AND CALL PT WITH RESULTS AS NOTED BELOW BY EULALIO

## 2020-04-17 DIAGNOSIS — E66.9 CLASS 1 OBESITY: ICD-10-CM

## 2020-04-21 RX ORDER — PHENTERMINE HYDROCHLORIDE 37.5 MG/1
TABLET ORAL
Qty: 15 TABLET | Refills: 1 | OUTPATIENT
Start: 2020-04-21

## 2020-04-22 ENCOUNTER — TELEPHONE (OUTPATIENT)
Dept: BARIATRICS | Facility: CLINIC | Age: 35
End: 2020-04-22

## 2020-04-22 ENCOUNTER — TELEMEDICINE (OUTPATIENT)
Dept: BARIATRICS | Facility: CLINIC | Age: 35
End: 2020-04-22
Payer: COMMERCIAL

## 2020-04-22 VITALS — WEIGHT: 178.4 LBS | HEIGHT: 61 IN | BODY MASS INDEX: 33.68 KG/M2

## 2020-04-22 DIAGNOSIS — E66.9 CLASS 1 OBESITY: ICD-10-CM

## 2020-04-22 DIAGNOSIS — R63.5 ABNORMAL WEIGHT GAIN: Primary | ICD-10-CM

## 2020-04-22 DIAGNOSIS — F41.1 GAD (GENERALIZED ANXIETY DISORDER): ICD-10-CM

## 2020-04-22 DIAGNOSIS — F17.200 CURRENT EVERY DAY SMOKER: ICD-10-CM

## 2020-04-22 PROCEDURE — 99214 OFFICE O/P EST MOD 30 MIN: CPT | Performed by: PHYSICIAN ASSISTANT

## 2020-04-22 RX ORDER — PHENTERMINE HYDROCHLORIDE 37.5 MG/1
TABLET ORAL
Qty: 30 TABLET | Refills: 1 | Status: SHIPPED | OUTPATIENT
Start: 2020-04-22 | End: 2020-10-21 | Stop reason: ALTCHOICE

## 2020-04-30 ENCOUNTER — TELEPHONE (OUTPATIENT)
Dept: BARIATRICS | Facility: CLINIC | Age: 35
End: 2020-04-30

## 2020-05-15 ENCOUNTER — TELEPHONE (OUTPATIENT)
Dept: BARIATRICS | Facility: CLINIC | Age: 35
End: 2020-05-15

## 2020-10-21 ENCOUNTER — OFFICE VISIT (OUTPATIENT)
Dept: FAMILY MEDICINE CLINIC | Facility: CLINIC | Age: 35
End: 2020-10-21
Payer: COMMERCIAL

## 2020-10-21 VITALS
BODY MASS INDEX: 33.61 KG/M2 | RESPIRATION RATE: 16 BRPM | DIASTOLIC BLOOD PRESSURE: 82 MMHG | WEIGHT: 178 LBS | HEIGHT: 61 IN | OXYGEN SATURATION: 98 % | HEART RATE: 81 BPM | SYSTOLIC BLOOD PRESSURE: 118 MMHG | TEMPERATURE: 98.4 F

## 2020-10-21 DIAGNOSIS — F17.200 CURRENT EVERY DAY SMOKER: ICD-10-CM

## 2020-10-21 DIAGNOSIS — Z28.21 INFLUENZA VACCINATION DECLINED BY PATIENT: ICD-10-CM

## 2020-10-21 DIAGNOSIS — Q15.9 EYE ABNORMALITY: ICD-10-CM

## 2020-10-21 DIAGNOSIS — F41.8 ANXIETY WITH DEPRESSION: ICD-10-CM

## 2020-10-21 DIAGNOSIS — Z13.220 SCREENING CHOLESTEROL LEVEL: ICD-10-CM

## 2020-10-21 DIAGNOSIS — Z12.4 CERVICAL CANCER SCREENING: ICD-10-CM

## 2020-10-21 DIAGNOSIS — Z00.00 ANNUAL PHYSICAL EXAM: Primary | ICD-10-CM

## 2020-10-21 DIAGNOSIS — Z13.1 SCREENING FOR DIABETES MELLITUS: ICD-10-CM

## 2020-10-21 PROCEDURE — 99214 OFFICE O/P EST MOD 30 MIN: CPT | Performed by: FAMILY MEDICINE

## 2020-10-21 PROCEDURE — 99395 PREV VISIT EST AGE 18-39: CPT | Performed by: FAMILY MEDICINE

## 2020-10-21 RX ORDER — BUPROPION HYDROCHLORIDE 150 MG/1
150 TABLET ORAL DAILY
Qty: 30 TABLET | Refills: 1 | Status: SHIPPED | OUTPATIENT
Start: 2020-10-21 | End: 2021-03-31

## 2021-02-22 ENCOUNTER — OFFICE VISIT (OUTPATIENT)
Dept: OBGYN CLINIC | Facility: CLINIC | Age: 36
End: 2021-02-22
Payer: COMMERCIAL

## 2021-02-22 VITALS
WEIGHT: 185 LBS | BODY MASS INDEX: 34.93 KG/M2 | HEIGHT: 61 IN | DIASTOLIC BLOOD PRESSURE: 80 MMHG | SYSTOLIC BLOOD PRESSURE: 120 MMHG

## 2021-02-22 DIAGNOSIS — N97.0 ANOVULATION: ICD-10-CM

## 2021-02-22 DIAGNOSIS — Z31.69 PRE-CONCEPTION COUNSELING: ICD-10-CM

## 2021-02-22 DIAGNOSIS — Z12.4 SCREENING FOR MALIGNANT NEOPLASM OF THE CERVIX: ICD-10-CM

## 2021-02-22 DIAGNOSIS — Z01.419 ENCOUNTER FOR GYNECOLOGICAL EXAMINATION WITHOUT ABNORMAL FINDING: Primary | ICD-10-CM

## 2021-02-22 PROCEDURE — 87624 HPV HI-RISK TYP POOLED RSLT: CPT | Performed by: OBSTETRICS & GYNECOLOGY

## 2021-02-22 PROCEDURE — G0145 SCR C/V CYTO,THINLAYER,RESCR: HCPCS | Performed by: OBSTETRICS & GYNECOLOGY

## 2021-02-22 PROCEDURE — 99385 PREV VISIT NEW AGE 18-39: CPT | Performed by: OBSTETRICS & GYNECOLOGY

## 2021-02-22 NOTE — PROGRESS NOTES
Assessment/Plan:       Diagnoses and all orders for this visit:    Encounter for gynecological examination without abnormal finding    Screening for malignant neoplasm of the cervix  -     Liquid-based pap, screening    Pre-conception counseling    Anovulation  -     Follicle stimulating hormone; Future  -     Anti-Mullerian Hormone(AMH), Female; Future  -     Estradiol; Future  -     TSH, 3rd generation; Future  -     Testosterone, free, total; Future  -     Androstenedione; Future  -     DHEA-sulfate; Future          Subjective:      Patient ID: Amanda Little is a 38 y o  female  The patient is a 39-year-old  3 para 3 who presents for annual exam and pre conceptual counseling  She is having regular periods every 29-30 days, without menorrhagia, metromenorrhagia, dysmenorrhea or any other unusual symptoms  She has had 3 spontaneous vaginal deliveries, all viable females, who are now 6 15 15years old  She  all 3 children  She has used no type of contraception since the birth of her [de-identified] year old  She is  to the father of her 3 children, and he has not had any medical changes  She does smoke cigarettes and has been advised to quit if possible prior to conception  She has no history of abnormal Pap smears  She denies any episodes of known pelvic inflammatory disease  She is not taking any medications consistently  We discussed beginning years with infertility workup, and we will begin by measuring some hormone levels  She appears to be having ovulatory cycles, but this will be tested for after the initial blood work is returned  We also discussed the possibility of a hysterosalpingogram and a semen analysis in the near future  She is following proper precautions in the pandemic  Her 3 daughters are attending school virtually  We will contact her with results of the blood work, and plan on scheduling some additional tests        The following portions of the patient's history were reviewed and updated as appropriate: allergies, current medications, past family history, past medical history, past social history, past surgical history and problem list     Review of Systems   Constitutional: Negative for chills, diaphoresis, fatigue, fever and unexpected weight change  HENT: Negative for congestion, sinus pressure, sinus pain, tinnitus and trouble swallowing  Eyes: Negative for visual disturbance  Respiratory: Negative for cough, chest tightness and shortness of breath  Cardiovascular: Negative for chest pain, palpitations and leg swelling  Gastrointestinal: Negative for abdominal distention, abdominal pain, anal bleeding, constipation, diarrhea, nausea, rectal pain and vomiting  Endocrine: Negative for heat intolerance  Genitourinary: Negative for difficulty urinating, dysuria, flank pain, frequency, genital sores, hematuria and urgency  Musculoskeletal: Negative for arthralgias, back pain and joint swelling  Skin: Negative for rash  Allergic/Immunologic: Negative for environmental allergies and food allergies  Neurological: Negative for headaches  Hematological: Negative for adenopathy  Does not bruise/bleed easily  Psychiatric/Behavioral: Negative for decreased concentration and dysphoric mood  The patient is not nervous/anxious  Objective:      /80 (BP Location: Left arm, Patient Position: Sitting, Cuff Size: Standard)   Ht 5' 1" (1 549 m)   Wt 83 9 kg (185 lb)   LMP 01/29/2021 (Exact Date)   BMI 34 96 kg/m²          Physical Exam  Vitals signs and nursing note reviewed  Exam conducted with a chaperone present  Constitutional:       General: She is not in acute distress  Appearance: Normal appearance  She is normal weight  She is not ill-appearing  HENT:      Head: Normocephalic  Nose: Nose normal       Mouth/Throat:      Mouth: Mucous membranes are moist       Pharynx: Oropharynx is clear     Eyes: Conjunctiva/sclera: Conjunctivae normal       Pupils: Pupils are equal, round, and reactive to light  Neck:      Musculoskeletal: Neck supple  Cardiovascular:      Rate and Rhythm: Normal rate and regular rhythm  Pulses: Normal pulses  Pulmonary:      Effort: Pulmonary effort is normal       Breath sounds: Normal breath sounds  Abdominal:      General: Abdomen is flat  Bowel sounds are normal       Palpations: Abdomen is soft  Genitourinary:     General: Normal vulva  Exam position: Lithotomy position  Darrell stage (genital): 5       Vagina: Normal       Cervix: Normal       Uterus: Normal        Adnexa: Right adnexa normal and left adnexa normal       Rectum: Normal    Musculoskeletal: Normal range of motion  Skin:     General: Skin is warm and dry  Neurological:      General: No focal deficit present  Mental Status: She is alert     Psychiatric:         Mood and Affect: Mood normal

## 2021-02-23 LAB
HPV HR 12 DNA CVX QL NAA+PROBE: NEGATIVE
HPV16 DNA CVX QL NAA+PROBE: NEGATIVE
HPV18 DNA CVX QL NAA+PROBE: NEGATIVE

## 2021-02-24 LAB
LAB AP GYN PRIMARY INTERPRETATION: NORMAL
Lab: NORMAL

## 2021-03-31 ENCOUNTER — OFFICE VISIT (OUTPATIENT)
Dept: FAMILY MEDICINE CLINIC | Facility: CLINIC | Age: 36
End: 2021-03-31
Payer: COMMERCIAL

## 2021-03-31 ENCOUNTER — TELEPHONE (OUTPATIENT)
Dept: OBGYN CLINIC | Facility: CLINIC | Age: 36
End: 2021-03-31

## 2021-03-31 VITALS
TEMPERATURE: 97.9 F | WEIGHT: 168 LBS | RESPIRATION RATE: 16 BRPM | HEIGHT: 61 IN | DIASTOLIC BLOOD PRESSURE: 78 MMHG | SYSTOLIC BLOOD PRESSURE: 110 MMHG | OXYGEN SATURATION: 98 % | BODY MASS INDEX: 31.72 KG/M2 | HEART RATE: 106 BPM

## 2021-03-31 DIAGNOSIS — N92.0 MENORRHAGIA WITH REGULAR CYCLE: ICD-10-CM

## 2021-03-31 DIAGNOSIS — N76.0 ACUTE VAGINITIS: Primary | ICD-10-CM

## 2021-03-31 DIAGNOSIS — Z83.3 FAMILY HISTORY OF DIABETES MELLITUS IN GRANDMOTHER: ICD-10-CM

## 2021-03-31 DIAGNOSIS — E66.09 CLASS 1 OBESITY DUE TO EXCESS CALORIES WITH BODY MASS INDEX (BMI) OF 31.0 TO 31.9 IN ADULT, UNSPECIFIED WHETHER SERIOUS COMORBIDITY PRESENT: ICD-10-CM

## 2021-03-31 DIAGNOSIS — R63.1 EXCESSIVE THIRST: Primary | ICD-10-CM

## 2021-03-31 DIAGNOSIS — B37.0 THRUSH: ICD-10-CM

## 2021-03-31 DIAGNOSIS — F17.200 CURRENT EVERY DAY SMOKER: ICD-10-CM

## 2021-03-31 PROCEDURE — 99214 OFFICE O/P EST MOD 30 MIN: CPT | Performed by: FAMILY MEDICINE

## 2021-03-31 RX ORDER — TRANEXAMIC ACID 650 1/1
1300 TABLET ORAL 3 TIMES DAILY PRN
Qty: 30 TABLET | Refills: 6 | Status: SHIPPED | OUTPATIENT
Start: 2021-03-31 | End: 2021-04-05

## 2021-03-31 RX ORDER — FLUCONAZOLE 150 MG/1
150 TABLET ORAL ONCE
Qty: 1 TABLET | Refills: 0 | Status: SHIPPED | OUTPATIENT
Start: 2021-03-31 | End: 2021-03-31

## 2021-03-31 NOTE — PROGRESS NOTES
Assessment/Plan:   Diagnoses and all orders for this visit:    Excessive thirst  -     HEMOGLOBIN A1C W/ EAG ESTIMATION; Future  - has script for labs (Lipids, CBC, CMP, TSH, Vit D) that was ordered in 10/2020 and another from her specialist (2/22/2021) - plans to go tmrw (4/1/2021) to get labs drawn  - will add A1c given polydipsia and polyuria  - (+) FHx of DM in both grandmothers   - RTO next week for f/u - pt aware and agreeable   Family history of diabetes mellitus in grandmother    Thrush  -     nystatin (MYCOSTATIN) 500,000 units/5 mL suspension; Apply 5 mL (500,000 Units total) to the mouth or throat 4 (four) times a day    Current every day smoker  - advised smoking cessation     Class 1 obesity due to excess calories with body mass index (BMI) of 31 0 to 31 9 in adult, unspecified whether serious comorbidity present  - BMI 31 7  - discussed diet and encouraged exercise  - educated that it takes 3500 nuria to lose 1lb  - advised to cut down on carbs, 5 servings of fruits/veggies/day, increase water intake (65-80oz/water/day)   - appropriate weight loss goal for women = 0 5-1lb/week  - per the [employer] Association - 150mins/exercise/week, but to lose and maintain weight 200-300mins/exercise/week   - t/c referral to [facility] at next OV           Subjective:    Patient ID: Carmelita Waldron is a 38 y o  female    HPI   - (+) oral pain which started last week   - unable to eat anything   - feels like mouth is dry   - (+) nausea  - has been drinking "a lot of water" - drinks 2-3 gallons of water/day - and also with increased urination   - denies F/C/V/earache/sinus pressure/runny nose/congestion   - (+) FHx of DM in both grandmothers   - still smoking     The following portions of the patient's history were reviewed and updated as appropriate: allergies, current medications, past family history, past medical history, past social history, past surgical history and problem list     Review of Systems as per HPI    Objective:  /78 (BP Location: Left arm, Patient Position: Sitting, Cuff Size: Standard)   Pulse (!) 106   Temp 97 9 °F (36 6 °C) (Tympanic)   Resp 16   Ht 5' 1" (1 549 m)   Wt 76 2 kg (168 lb)   SpO2 98% Comment: 98  BMI 31 74 kg/m²    Physical Exam  Vitals signs reviewed  Constitutional:       General: She is not in acute distress  Appearance: She is obese  She is not ill-appearing, toxic-appearing or diaphoretic  HENT:      Head: Normocephalic and atraumatic  Right Ear: External ear normal       Left Ear: External ear normal       Mouth/Throat:      Mouth: Mucous membranes are dry  Tongue: Lesions present  Pharynx: Oropharynx is clear  Tonsils: No tonsillar exudate  Comments: White lesions coating sides of tongue   Eyes:      General:         Right eye: No discharge  Left eye: No discharge  Extraocular Movements: Extraocular movements intact  Conjunctiva/sclera: Conjunctivae normal    Neck:      Musculoskeletal: Normal range of motion and neck supple  Pulmonary:      Effort: Pulmonary effort is normal    Musculoskeletal: Normal range of motion  Neurological:      General: No focal deficit present  Mental Status: She is alert and oriented to person, place, and time  BMI Counseling: Body mass index is 31 74 kg/m²  The BMI is above normal  Nutrition recommendations include reducing portion sizes and decreasing overall calorie intake  Exercise recommendations include moderate aerobic physical activity for 150 minutes/week, exercising 3-5 times per week, joining a gym and strength training exercises

## 2021-03-31 NOTE — TELEPHONE ENCOUNTER
Pt called stated she is on her 8th day of her period and it is very heavy  Also c/o yeast infection      Would like to know if medication can be called into pharmacy or should she come in?

## 2021-04-01 ENCOUNTER — TRANSCRIBE ORDERS (OUTPATIENT)
Dept: LAB | Facility: CLINIC | Age: 36
End: 2021-04-01

## 2021-04-01 ENCOUNTER — APPOINTMENT (OUTPATIENT)
Dept: LAB | Facility: CLINIC | Age: 36
End: 2021-04-01
Payer: COMMERCIAL

## 2021-04-01 DIAGNOSIS — F41.8 ANXIETY WITH DEPRESSION: ICD-10-CM

## 2021-04-01 DIAGNOSIS — R63.1 EXCESSIVE THIRST: ICD-10-CM

## 2021-04-01 DIAGNOSIS — Z13.220 SCREENING CHOLESTEROL LEVEL: ICD-10-CM

## 2021-04-01 DIAGNOSIS — Z13.1 SCREENING FOR DIABETES MELLITUS: ICD-10-CM

## 2021-04-01 DIAGNOSIS — N97.0 ANOVULATION: ICD-10-CM

## 2021-04-01 LAB
25(OH)D3 SERPL-MCNC: 8.9 NG/ML (ref 30–100)
ALBUMIN SERPL BCP-MCNC: 3.5 G/DL (ref 3.5–5)
ALP SERPL-CCNC: 362 U/L (ref 46–116)
ALT SERPL W P-5'-P-CCNC: 54 U/L (ref 12–78)
ANION GAP SERPL CALCULATED.3IONS-SCNC: 10 MMOL/L (ref 4–13)
AST SERPL W P-5'-P-CCNC: 36 U/L (ref 5–45)
BASOPHILS # BLD AUTO: 0.05 THOUSANDS/ΜL (ref 0–0.1)
BASOPHILS NFR BLD AUTO: 1 % (ref 0–1)
BILIRUB SERPL-MCNC: 0.69 MG/DL (ref 0.2–1)
BUN SERPL-MCNC: 9 MG/DL (ref 5–25)
CALCIUM SERPL-MCNC: 10.7 MG/DL (ref 8.3–10.1)
CHLORIDE SERPL-SCNC: 95 MMOL/L (ref 100–108)
CHOLEST SERPL-MCNC: 242 MG/DL (ref 50–200)
CO2 SERPL-SCNC: 28 MMOL/L (ref 21–32)
CREAT SERPL-MCNC: 0.92 MG/DL (ref 0.6–1.3)
EOSINOPHIL # BLD AUTO: 0.23 THOUSAND/ΜL (ref 0–0.61)
EOSINOPHIL NFR BLD AUTO: 3 % (ref 0–6)
ERYTHROCYTE [DISTWIDTH] IN BLOOD BY AUTOMATED COUNT: 11.9 % (ref 11.6–15.1)
EST. AVERAGE GLUCOSE BLD GHB EST-MCNC: 289 MG/DL
ESTRADIOL SERPL-MCNC: 26 PG/ML
FSH SERPL-ACNC: 7 MIU/ML
GFR SERPL CREATININE-BSD FRML MDRD: 93 ML/MIN/1.73SQ M
GLUCOSE P FAST SERPL-MCNC: 479 MG/DL (ref 65–99)
HBA1C MFR BLD: 11.7 %
HCT VFR BLD AUTO: 39.2 % (ref 34.8–46.1)
HDLC SERPL-MCNC: 82 MG/DL
HGB BLD-MCNC: 13.5 G/DL (ref 11.5–15.4)
IMM GRANULOCYTES # BLD AUTO: 0.09 THOUSAND/UL (ref 0–0.2)
IMM GRANULOCYTES NFR BLD AUTO: 1 % (ref 0–2)
LDLC SERPL CALC-MCNC: 108 MG/DL (ref 0–100)
LYMPHOCYTES # BLD AUTO: 2.94 THOUSANDS/ΜL (ref 0.6–4.47)
LYMPHOCYTES NFR BLD AUTO: 38 % (ref 14–44)
MCH RBC QN AUTO: 30.9 PG (ref 26.8–34.3)
MCHC RBC AUTO-ENTMCNC: 34.4 G/DL (ref 31.4–37.4)
MCV RBC AUTO: 90 FL (ref 82–98)
MONOCYTES # BLD AUTO: 0.59 THOUSAND/ΜL (ref 0.17–1.22)
MONOCYTES NFR BLD AUTO: 8 % (ref 4–12)
NEUTROPHILS # BLD AUTO: 3.86 THOUSANDS/ΜL (ref 1.85–7.62)
NEUTS SEG NFR BLD AUTO: 49 % (ref 43–75)
NONHDLC SERPL-MCNC: 160 MG/DL
NRBC BLD AUTO-RTO: 0 /100 WBCS
PLATELET # BLD AUTO: 410 THOUSANDS/UL (ref 149–390)
PMV BLD AUTO: 10.2 FL (ref 8.9–12.7)
POTASSIUM SERPL-SCNC: 4 MMOL/L (ref 3.5–5.3)
PROT SERPL-MCNC: 9.1 G/DL (ref 6.4–8.2)
RBC # BLD AUTO: 4.37 MILLION/UL (ref 3.81–5.12)
SODIUM SERPL-SCNC: 133 MMOL/L (ref 136–145)
TRIGL SERPL-MCNC: 258 MG/DL
TSH SERPL DL<=0.05 MIU/L-ACNC: 2.8 UIU/ML (ref 0.36–3.74)
WBC # BLD AUTO: 7.76 THOUSAND/UL (ref 4.31–10.16)

## 2021-04-01 PROCEDURE — 80061 LIPID PANEL: CPT

## 2021-04-01 PROCEDURE — 84402 ASSAY OF FREE TESTOSTERONE: CPT

## 2021-04-01 PROCEDURE — 36415 COLL VENOUS BLD VENIPUNCTURE: CPT

## 2021-04-01 PROCEDURE — 83001 ASSAY OF GONADOTROPIN (FSH): CPT

## 2021-04-01 PROCEDURE — 83036 HEMOGLOBIN GLYCOSYLATED A1C: CPT

## 2021-04-01 PROCEDURE — 85025 COMPLETE CBC W/AUTO DIFF WBC: CPT

## 2021-04-01 PROCEDURE — 83516 IMMUNOASSAY NONANTIBODY: CPT

## 2021-04-01 PROCEDURE — 82157 ASSAY OF ANDROSTENEDIONE: CPT

## 2021-04-01 PROCEDURE — 82670 ASSAY OF TOTAL ESTRADIOL: CPT

## 2021-04-01 PROCEDURE — 82306 VITAMIN D 25 HYDROXY: CPT

## 2021-04-01 PROCEDURE — 80053 COMPREHEN METABOLIC PANEL: CPT

## 2021-04-01 PROCEDURE — 84443 ASSAY THYROID STIM HORMONE: CPT | Performed by: FAMILY MEDICINE

## 2021-04-01 PROCEDURE — 84403 ASSAY OF TOTAL TESTOSTERONE: CPT

## 2021-04-01 PROCEDURE — 82627 DEHYDROEPIANDROSTERONE: CPT

## 2021-04-02 DIAGNOSIS — E11.9 NEWLY DIAGNOSED DIABETES (HCC): Primary | ICD-10-CM

## 2021-04-02 LAB
DHEA-S SERPL-MCNC: 369 UG/DL (ref 57.3–279.2)
TESTOST FREE SERPL-MCNC: 4.3 PG/ML (ref 0–4.2)
TESTOST SERPL-MCNC: 59 NG/DL (ref 8–48)

## 2021-04-05 ENCOUNTER — TELEPHONE (OUTPATIENT)
Dept: FAMILY MEDICINE CLINIC | Facility: CLINIC | Age: 36
End: 2021-04-05

## 2021-04-05 NOTE — TELEPHONE ENCOUNTER
PT CALLED   PER DR BURROUGHS PT IS TO DOUBLE METFORMIN TO 1000 BID UNTIL APT ON WED WITH PCP, MAY NEED TO CHANGE TO A DIF MED    PT WAS INFORMED

## 2021-04-06 LAB — MIS SERPL-MCNC: 5.1 NG/ML

## 2021-04-07 ENCOUNTER — OFFICE VISIT (OUTPATIENT)
Dept: FAMILY MEDICINE CLINIC | Facility: CLINIC | Age: 36
End: 2021-04-07
Payer: COMMERCIAL

## 2021-04-07 VITALS
BODY MASS INDEX: 31.72 KG/M2 | WEIGHT: 168 LBS | DIASTOLIC BLOOD PRESSURE: 70 MMHG | SYSTOLIC BLOOD PRESSURE: 110 MMHG | OXYGEN SATURATION: 98 % | HEIGHT: 61 IN | HEART RATE: 91 BPM | RESPIRATION RATE: 16 BRPM

## 2021-04-07 DIAGNOSIS — E11.65 TYPE 2 DIABETES MELLITUS WITH HYPERGLYCEMIA, UNSPECIFIED WHETHER LONG TERM INSULIN USE (HCC): ICD-10-CM

## 2021-04-07 DIAGNOSIS — E66.09 CLASS 1 OBESITY DUE TO EXCESS CALORIES WITH BODY MASS INDEX (BMI) OF 31.0 TO 31.9 IN ADULT, UNSPECIFIED WHETHER SERIOUS COMORBIDITY PRESENT: ICD-10-CM

## 2021-04-07 DIAGNOSIS — F17.200 CURRENT EVERY DAY SMOKER: ICD-10-CM

## 2021-04-07 DIAGNOSIS — E78.5 HYPERLIPIDEMIA LDL GOAL <70: ICD-10-CM

## 2021-04-07 DIAGNOSIS — M25.551 HIP PAIN, ACUTE, RIGHT: ICD-10-CM

## 2021-04-07 DIAGNOSIS — E11.9 NEWLY DIAGNOSED DIABETES (HCC): Primary | ICD-10-CM

## 2021-04-07 PROCEDURE — 90732 PPSV23 VACC 2 YRS+ SUBQ/IM: CPT | Performed by: FAMILY MEDICINE

## 2021-04-07 PROCEDURE — 90471 IMMUNIZATION ADMIN: CPT | Performed by: FAMILY MEDICINE

## 2021-04-07 PROCEDURE — 99215 OFFICE O/P EST HI 40 MIN: CPT | Performed by: FAMILY MEDICINE

## 2021-04-07 RX ORDER — INSULIN GLARGINE 100 [IU]/ML
10 INJECTION, SOLUTION SUBCUTANEOUS
Qty: 3 ML | Refills: 0 | Status: SHIPPED | OUTPATIENT
Start: 2021-04-07 | End: 2021-04-16

## 2021-04-07 RX ORDER — ROSUVASTATIN CALCIUM 5 MG/1
5 TABLET, COATED ORAL DAILY
Qty: 30 TABLET | Refills: 0 | Status: SHIPPED | OUTPATIENT
Start: 2021-04-07 | End: 2021-05-19 | Stop reason: SDUPTHER

## 2021-04-07 RX ORDER — LANCETS 33 GAUGE
EACH MISCELLANEOUS
Qty: 100 EACH | Refills: 11 | Status: SHIPPED | OUTPATIENT
Start: 2021-04-07

## 2021-04-07 RX ORDER — BLOOD SUGAR DIAGNOSTIC
STRIP MISCELLANEOUS
Qty: 100 EACH | Refills: 2 | Status: CANCELLED | OUTPATIENT
Start: 2021-04-07

## 2021-04-07 RX ORDER — LISINOPRIL 2.5 MG/1
2.5 TABLET ORAL DAILY
Qty: 30 TABLET | Refills: 0 | Status: SHIPPED | OUTPATIENT
Start: 2021-04-07 | End: 2021-05-19 | Stop reason: SDUPTHER

## 2021-04-07 RX ORDER — BLOOD SUGAR DIAGNOSTIC
STRIP MISCELLANEOUS
Qty: 100 EACH | Refills: 11 | Status: SHIPPED | OUTPATIENT
Start: 2021-04-07 | End: 2021-05-19 | Stop reason: SDUPTHER

## 2021-04-07 RX ORDER — BLOOD-GLUCOSE METER
1 KIT MISCELLANEOUS
Qty: 1 KIT | Refills: 0 | Status: SHIPPED | OUTPATIENT
Start: 2021-04-07

## 2021-04-07 NOTE — PATIENT INSTRUCTIONS
Type 2 Diabetes in Adults: New Diagnosis   AMBULATORY CARE:   Type 2 diabetes  is a disease that affects how your body uses glucose (sugar)  Normally, when the blood sugar level increases, the pancreas makes more insulin  Insulin helps move sugar out of the blood so it can be used for energy  Type 2 diabetes develops because either the body cannot make enough insulin, or it cannot use the insulin correctly  Type 2 diabetes can be controlled to prevent damage to your heart, blood vessels, and other organs  Common symptoms include the following:   · Numbness in your fingers or toes    · Blurred vision     · Frequent urination     · More hunger or thirst than usual    Have someone call your local emergency number (911 in the [facility]) if:   · You have any of the following signs of a stroke:      ? Numbness or drooping on one side of your face     ? Weakness in an arm or leg    ? Confusion or difficulty speaking    ? Dizziness, a severe headache, or vision loss    · You have any of the following signs of a heart attack:      ? Squeezing, pressure, or pain in your chest    ? You may  also have any of the following:     § Discomfort or pain in your back, neck, jaw, stomach, or arm    § Shortness of breath    § Nausea or vomiting    § Lightheadedness or a sudden cold sweat    · You have trouble breathing  Call your doctor or care team if:   · You have severe abdominal pain  · You vomit for more than 2 hours  · You have trouble staying awake or focusing  · You are shaking or sweating  · You feel weak or more tired than usual      · You have blurred or double vision  · Your breath has a fruity, sweet smell  · Your arms and legs are swollen  · You have an upset stomach and cannot eat the foods on your meal plan  · You feel dizzy, have headaches, or are easily irritated  · Your skin is red, warm, dry, or swollen       · You have a wound that does not heal      · You have numbness in your arms or legs  · You have trouble coping with your illness, or you feel anxious or depressed  · You have questions or concerns about your condition or care  Treatment for type 2 diabetes  helps prevent or delay complications, including heart and kidney disease  You must eat healthy meals and do regular physical activity  You may  need any of the following:  · Diabetes medicines or insulin  may be given to decrease the amount of sugar in your blood  · Blood pressure medicine  may be given to lower your blood pressure  · Cholesterol-lowering medicine  may be given to prevent heart disease  · Antiplatelets , such as aspirin, help prevent blood clots  Take your antiplatelet medicine exactly as directed  These medicines make it more likely for you to bleed or bruise  If you are told to take aspirin, do not take acetaminophen or ibuprofen instead  · Take your medicine as directed  Contact your healthcare provider if you think your medicine is not helping or if you have side effects  Tell him or her if you are allergic to any medicine  Keep a list of the medicines, vitamins, and herbs you take  Include the amounts, and when and why you take them  Bring the list or the pill bottles to follow-up visits  Carry your medicine list with you in case of an emergency  Diabetes education:  You may have providers come to your house and teach you more about diabetes  You may instead attend classes with others who have diabetes  You will be taught the following:  · About nutrition:  A dietitian will help you make a meal plan to keep your blood sugar level steady  You will learn how food affects your blood sugar levels  You will also learn to keep track of sugar and starchy foods (carbohydrates)  Do not skip meals  Your blood sugar level may drop too low if you have taken diabetes medicine and do not eat  You may be taught to use the plate method when eating  The plate method will help with portion control   With the plate method, ½ of your plate contains vegetables  The other half is divided so that ¼ of your plate contains protein or meat, and ¼ contains starches, such as potatoes  · Physical activity and diabetes: You will learn why physical activity, such as walking, is important  You and your care team provider will make a plan for your activity  Your care team provider will tell you what a healthy weight will be for you  He or she will help you make a plan to get to that weight and stay there  Maintain a healthy weight to help delay or prevent complications of diabetes  · How to check your blood sugar level: You will learn what your blood sugar level should be  You will be given information on when to check your blood sugar level  You will learn what to do if your level is too high or too low  Write down the times of your checks and your levels  Take them to all follow-up appointments  · If you need insulin:  You and your family members will be taught how to draw up and give insulin  You will learn how much insulin you need and what time to inject insulin  You will be taught when to not give insulin  Your education team will also teach you how to dispose of needles and syringes  Other ways to help manage type 2 diabetes:   · Check your feet each day for sores  Wear shoes and socks that fit correctly  Do not trim your toenails  Ask your care team provider for more information about foot care  · Know the risks if you choose to drink alcohol  Alcohol can cause your blood sugar levels to be low if you use insulin  Alcohol can cause high blood sugar levels and weight gain if you drink too much  Women 21 years or older and men 72 years or older should limit alcohol to 1 drink a day  Men aged 24 to 59 years should limit alcohol to 2 drinks a day  A drink of alcohol is 12 ounces of beer, 5 ounces of wine, or 1½ ounces of liquor  · Do not smoke    Nicotine and other chemicals in cigarettes and cigars can cause lung damage and make diabetes harder to manage  Ask your care team provider for information if you currently smoke and need help to quit  E-cigarettes or smokeless tobacco still contain nicotine  Talk to your care team provider before you use these products  · Check your blood pressure as directed  You may have high blood pressure when you have type 2 diabetes  Talk to your care team provider about your blood pressure goals  Together you can create a plan to lower your blood pressure if needed and keep it in a healthy range  The plan may include lifestyle changes or medicines  A normal blood pressure is 119/79 or lower  A normal blood pressure can help prevent or delay certain complications from diabetes  Examples include retinopathy (eye damage) and kidney damage  · Wear medical alert identification  Wear medical alert jewelry or carry a card that says you have diabetes  Ask your care team provider where to get these items  · Ask about vaccines  You have a higher risk for serious illness if you get the flu, pneumonia, or hepatitis  Ask your care team provider if you should get a flu, pneumonia, or hepatitis B vaccine, and when to get the vaccine  Follow up with your diabetes care team providers as directed: You may need to return to have your A1c checked every 3 months  You will need to return at least once each year to have your feet checked  You will need an eye exam once a year to check for retinopathy  You will also need urine tests every year to check for kidney problems  You may need tests to monitor for heart disease, such as an EKG, stress test, blood pressure monitoring, and blood tests  Write down your questions so you remember to ask them during your visits  © Copyright [address] Information is for End User's use only and may not be sold, redistributed or otherwise used for commercial purposes   All illustrations and images included in CareNotes® are the copyrighted property of A D A M , Inc  or [facility] Bhargavi Sorto   The above information is an  only  It is not intended as medical advice for individual conditions or treatments  Talk to your doctor, nurse or pharmacist before following any medical regimen to see if it is safe and effective for you

## 2021-04-07 NOTE — PROGRESS NOTES
Assessment/Plan:   Diagnoses and all orders for this visit:    Newly diagnosed diabetes ([address] )  -     insulin glargine (Lantus SoloStar) 100 units/mL injection pen; Inject 10 Units under the skin daily at bedtime  -     Ambulatory referral to Diabetic Education; Future  -     Ambulatory referral to Endocrinology; Future  -     Ambulatory referral to Ophthalmology; Future  -     metFORMIN (GLUCOPHAGE) 1000 MG tablet; Take 1 tablet (1,000 mg total) by mouth 2 (two) times a day with meals  -     Microalbumin / creatinine urine ratio  - CMP (4/1/2021): , nml renal function   - A1c 11 7 (4/1/2021)   - cont Meformin 1000mg BID   - will start on Lantus 10U QHS   - received PCV23 in the office today  - will start on ACEI 2 5mg QD for yvonne-protection   - referred to DM education, Endocrine and Optho   - advised to RTO in 2wks for f/u - pt aware and agreeable   Type 2 diabetes mellitus with hyperglycemia, unspecified whether long term insulin use (HCC)  -     PNEUMOVAX (PNEUMOCOCCAL POLYSACCHARIDE VACCINE 23-VALENT =>3YO)  -     Blood Glucose Monitoring Suppl (OneTouch Verio Reflect) w/Device KIT; Use 1 kit daily at bedtime E11 65, Please substitute with appropriate alternative as covered by patient's insurance  -     glucose blood (OneTouch Verio) test strip; E11 65, Please substitute with appropriate alternative as covered by patient's insurance  -     OneTouch Delica Lancets 36L MISC; Use daily at bedtime E11 65, Please substitute with appropriate alternative as covered by patient's insurance  -     lisinopril (ZESTRIL) 2 5 mg tablet; Take 1 tablet (2 5 mg total) by mouth daily    Hyperlipidemia LDL goal <70  -     rosuvastatin (CRESTOR) 5 mg tablet;  Take 1 tablet (5 mg total) by mouth daily  - Lipids: , , HDL 82,    - will start on Crestor 5mg QHS for better LDL control     Current every day smoker  - advised smoking cessation     Class 1 obesity due to excess calories with body mass index (BMI) of 31 0 to 31 9 in adult, unspecified whether serious comorbidity present  - BMI 31 7  - discussed diet and encouraged exercise  - educated that it takes 3500 nuria to lose 1lb  - advised to cut down on carbs, 5 servings of fruits/veggies/day, increase water intake (65-80oz/water/day)   - appropriate weight loss goal for women = 0 5-1lb/week  - per the Heart Association - 150mins/exercise/week, but to lose and maintain weight 200-300mins/exercise/week   - t/c referral to [address] [place] [address] [facility] at next OV     Hip pain, acute, right  -     Ambulatory referral to Orthopedic Surgery; Future    Other orders  -     Cancel: Insulin Syringe-Needle U-100 31G X 5/16" 1 ML MISC; Use daily at bedtime          Subjective:    Patient ID: Edouard Neal is a 39 y o  female  43yo F presents to the office for f/u of abnml labs  1) Labs (4/1/2021)   - nml TSH, CBC  - Lipids: , , HDL 82,    - Vit D: 9   - CMP: , nml renal function   - A1c 11 7   2) DM2/HL/Obesity   - currently on Metformin 1000mg BID (was started on 500mg BID on 4/2/2021 but then noted her sugars were >500 on 4/5/2021, called the office and was advised to increase to 1000mg BID)   - (+) stomach burning earlier and unable to eat  - (+) polydipsia and polyuria  - (+) FHx of DM in both grandmothers   - PMHx of GDM   - Lipids: , , HDL 82,    - no Pneumococcal vaccine - will get in the office today   - UTD with Tdap       The following portions of the patient's history were reviewed and updated as appropriate: allergies, current medications, past family history, past medical history, past social history, past surgical history and problem list     Review of Systems  as per HPI    Objective:  /70   Pulse 91   Resp 16   Ht 5' 1" (1 549 m)   Wt 76 2 kg (168 lb)   SpO2 98%   BMI 31 74 kg/m²    Physical Exam  Vitals signs reviewed  Constitutional:       General: She is not in acute distress       Appearance: She is obese  She is not ill-appearing, toxic-appearing or diaphoretic  HENT:      Head: Normocephalic and atraumatic  Right Ear: External ear normal       Left Ear: External ear normal    Eyes:      General: No scleral icterus  Right eye: No discharge  Left eye: No discharge  Extraocular Movements: Extraocular movements intact  Conjunctiva/sclera: Conjunctivae normal    Neck:      Musculoskeletal: Normal range of motion and neck supple  Cardiovascular:      Rate and Rhythm: Normal rate and regular rhythm  Heart sounds: Normal heart sounds  No murmur  No friction rub  No gallop  Pulmonary:      Effort: Pulmonary effort is normal       Breath sounds: Normal breath sounds  Abdominal:      General: Bowel sounds are normal       Palpations: Abdomen is soft  Musculoskeletal: Normal range of motion  Right lower leg: No edema  Left lower leg: No edema  Skin:     General: Skin is warm  Neurological:      General: No focal deficit present  Mental Status: She is alert and oriented to person, place, and time  Psychiatric:         Attention and Perception: Attention normal          Mood and Affect: Mood is anxious  Affect is tearful  Speech: Speech normal  She is communicative  Behavior: Behavior normal  Behavior is cooperative  BMI Counseling: Body mass index is 31 74 kg/m²  The BMI is above normal  Nutrition recommendations include reducing portion sizes and decreasing overall calorie intake  Exercise recommendations include moderate aerobic physical activity for 150 minutes/week, exercising 3-5 times per week, joining a gym and strength training exercises

## 2021-04-08 ENCOUNTER — TELEPHONE (OUTPATIENT)
Dept: FAMILY MEDICINE CLINIC | Facility: CLINIC | Age: 36
End: 2021-04-08

## 2021-04-09 DIAGNOSIS — E11.65 TYPE 2 DIABETES MELLITUS WITH HYPERGLYCEMIA, UNSPECIFIED WHETHER LONG TERM INSULIN USE (HCC): ICD-10-CM

## 2021-04-09 DIAGNOSIS — E11.9 NEWLY DIAGNOSED DIABETES (HCC): Primary | ICD-10-CM

## 2021-04-09 RX ORDER — PEN NEEDLE, DIABETIC 31 GX5/16"
NEEDLE, DISPOSABLE MISCELLANEOUS
Qty: 100 EACH | Refills: 2 | Status: SHIPPED | OUTPATIENT
Start: 2021-04-09 | End: 2021-05-19 | Stop reason: SDUPTHER

## 2021-04-11 LAB — ANDROST SERPL-MCNC: 149 NG/DL (ref 41–262)

## 2021-04-15 ENCOUNTER — APPOINTMENT (OUTPATIENT)
Dept: LAB | Facility: CLINIC | Age: 36
End: 2021-04-15
Payer: COMMERCIAL

## 2021-04-15 LAB
CREAT UR-MCNC: 118 MG/DL
MICROALBUMIN UR-MCNC: 5.2 MG/L (ref 0–20)
MICROALBUMIN/CREAT 24H UR: 4 MG/G CREATININE (ref 0–30)

## 2021-04-15 PROCEDURE — 82570 ASSAY OF URINE CREATININE: CPT | Performed by: FAMILY MEDICINE

## 2021-04-15 PROCEDURE — 82043 UR ALBUMIN QUANTITATIVE: CPT | Performed by: FAMILY MEDICINE

## 2021-04-16 ENCOUNTER — OFFICE VISIT (OUTPATIENT)
Dept: FAMILY MEDICINE CLINIC | Facility: CLINIC | Age: 36
End: 2021-04-16
Payer: COMMERCIAL

## 2021-04-16 VITALS
WEIGHT: 175 LBS | HEART RATE: 81 BPM | SYSTOLIC BLOOD PRESSURE: 110 MMHG | DIASTOLIC BLOOD PRESSURE: 70 MMHG | OXYGEN SATURATION: 99 % | RESPIRATION RATE: 16 BRPM | BODY MASS INDEX: 33.04 KG/M2 | HEIGHT: 61 IN

## 2021-04-16 DIAGNOSIS — E11.65 TYPE 2 DIABETES MELLITUS WITH HYPERGLYCEMIA, UNSPECIFIED WHETHER LONG TERM INSULIN USE (HCC): ICD-10-CM

## 2021-04-16 DIAGNOSIS — E78.5 HYPERLIPIDEMIA LDL GOAL <70: ICD-10-CM

## 2021-04-16 DIAGNOSIS — E11.9 NEWLY DIAGNOSED DIABETES (HCC): Primary | ICD-10-CM

## 2021-04-16 DIAGNOSIS — Z87.891 FORMER SMOKER: ICD-10-CM

## 2021-04-16 DIAGNOSIS — H53.8 BLURRY VISION, BILATERAL: ICD-10-CM

## 2021-04-16 DIAGNOSIS — G62.9 NEUROPATHY: ICD-10-CM

## 2021-04-16 DIAGNOSIS — F41.8 ANXIETY WITH DEPRESSION: ICD-10-CM

## 2021-04-16 DIAGNOSIS — Q15.9 EYE ABNORMALITY: ICD-10-CM

## 2021-04-16 PROCEDURE — 99215 OFFICE O/P EST HI 40 MIN: CPT | Performed by: FAMILY MEDICINE

## 2021-04-16 RX ORDER — GABAPENTIN 100 MG/1
100 CAPSULE ORAL
Qty: 30 CAPSULE | Refills: 2 | Status: SHIPPED | OUTPATIENT
Start: 2021-04-16 | End: 2021-08-20

## 2021-04-16 RX ORDER — BUPROPION HYDROCHLORIDE 150 MG/1
150 TABLET ORAL DAILY
Qty: 30 TABLET | Refills: 1 | Status: SHIPPED | OUTPATIENT
Start: 2021-04-16 | End: 2021-08-20

## 2021-04-16 RX ORDER — INSULIN GLARGINE 100 [IU]/ML
20 INJECTION, SOLUTION SUBCUTANEOUS
Qty: 3 ML | Refills: 0
Start: 2021-04-16 | End: 2021-05-19 | Stop reason: SDUPTHER

## 2021-04-16 NOTE — PROGRESS NOTES
Assessment/Plan:   Diagnoses and all orders for this visit:    Newly diagnosed diabetes ([address] )  -     insulin glargine (Lantus SoloStar) 100 units/mL injection pen; Inject 20 Units under the skin daily at bedtime  Type 2 diabetes mellitus with hyperglycemia, unspecified whether long term insulin use (HCC)  - CMP (4/1/2021): , nml renal function   - A1c 11 7 (4/1/2021)   - nml urine microalbumin   - -300s, AM sugar 270   - still with blurry vision - needs better glycemic control, of note, pt has an appt scheduled with [facility] for 5/5/2021   - has been walking QD   - Mom is a nutritionist   - cont Meformin 1000mg BID   - will increase Lantus from 10U QHS to 20U QHS    - received PCV23 in the office 4/7/2021   - DM foot exam with slightly diminished sensation - will start on Gabapentin 100mg QHS   - cont ACEI 2 5mg QD for yvonne-protection   - has an appt with DM education on 5/5/2021   - has an appt with Endocrine on 8/18/2021  - advised to RTO in 4wks for f/u - pt aware and agreeable     BMI 33 0-33 9,adult  - discussed diet and encouraged exercise  - educated that it takes 3500 nuria to lose 1lb  - advised to cut down on carbs, 5 servings of fruits/veggies/day, increase water intake (65-80oz/water/day)   - appropriate weight loss goal for women = 0 5-1lb/week  - per the Heart Association - 150mins/exercise/week, but to lose and maintain weight 200-300mins/exercise/week     Hyperlipidemia LDL goal <70  - Lipids (4/1/2021): , , HDL 82,    - cont Crestor 5mg QHS for better LDL control     Neuropathy  -     gabapentin (NEURONTIN) 100 mg capsule; Take 1 capsule (100 mg total) by mouth daily at bedtime    Blurry vision, bilateral  Eye abnormality  - still with blurry vision   - needs better glycemic control  - of note, pt has an appt scheduled with [facility] for 5/5/2021     Former smoker  - quit last week   Anxiety with depression  -     buPROPion (WELLBUTRIN XL) 150 mg 24 hr tablet;  Take 1 tablet (150 mg total) by mouth daily  -     Ambulatory referral to behavioral health therapists; Future  - STELLA-7 score of 11  - PHQ-9 score of 13  - denies SI/HI  - tearful in the office today   - discussed medication options, and pt interested in restarting Wellbutrin XL 150mg QD - discussed the importance of medication compliance   - referred to interim in-office therapist  - Kiera Lentz in 4wks for f/u - pt aware and agreeable           Subjective:    Patient ID: Roger Agudelo is a 39 y o  female  HPI   45yo F presents to the office for f/u   - newly dx diabetic   - A1c (4/1/2021): 11 7  - currently on Metformin 1000mg BID and Lantus 10U QHS   - still with blurry vision - follows with Dr Jorje Gray - last OV was "a while ago", has an appt scheduled for 5/5/2021   - -300s, AM sugar 270   - has been walking QD   - Mom is a nutritionist   - denies F/N/V/HA   - (+) chills  - not sleeping - has maybe slept for 2hrs QHS  - has not smoked for the past week   - STELLA-7 score of 11  - PHQ-9 score of 13  - denies SI/HI  - (+) tingling sensation in her b/l legs       The following portions of the patient's history were reviewed and updated as appropriate: allergies, current medications, past family history, past medical history, past social history, past surgical history and problem list     Review of Systems  as per HPI    Objective:  /70   Pulse 81   Resp 16   Ht 5' 1" (1 549 m)   Wt 79 4 kg (175 lb)   SpO2 99%   BMI 33 07 kg/m²    Physical Exam  Vitals signs reviewed  Constitutional:       General: She is not in acute distress  Appearance: Normal appearance  She is obese  She is not ill-appearing, toxic-appearing or diaphoretic  HENT:      Head: Normocephalic and atraumatic  Right Ear: External ear normal       Left Ear: External ear normal    Eyes:      General: No scleral icterus  Right eye: No discharge  Left eye: No discharge  Extraocular Movements: Extraocular movements intact  Conjunctiva/sclera: Conjunctivae normal    Neck:      Musculoskeletal: Normal range of motion  Cardiovascular:      Rate and Rhythm: Normal rate and regular rhythm  Pulses: no weak pulses          Dorsalis pedis pulses are 2+ on the right side and 2+ on the left side  Heart sounds: Normal heart sounds  No murmur  No friction rub  No gallop  Pulmonary:      Effort: Pulmonary effort is normal  No respiratory distress  Breath sounds: Normal breath sounds  No stridor  No wheezing, rhonchi or rales  Abdominal:      General: Bowel sounds are normal       Palpations: Abdomen is soft  Musculoskeletal: Normal range of motion  General: No swelling, tenderness, deformity or signs of injury  Right lower leg: No edema  Left lower leg: No edema  Feet:      Right foot:      Skin integrity: No ulcer, skin breakdown, erythema, warmth, callus or dry skin  Left foot:      Skin integrity: No ulcer, skin breakdown, erythema, warmth, callus or dry skin  Skin:     General: Skin is warm  Neurological:      General: No focal deficit present  Mental Status: She is alert and oriented to person, place, and time  Psychiatric:         Attention and Perception: Attention normal          Mood and Affect: Mood is anxious and depressed  Affect is tearful  Speech: Speech normal  She is communicative  Behavior: Behavior normal  Behavior is cooperative  Thought Content: Thought content normal  Thought content does not include homicidal or suicidal ideation  Thought content does not include homicidal or suicidal plan  Cognition and Memory: Cognition normal          Judgment: Judgment normal       Comments: - STELLA-7 score of 11  - PHQ-9 score of 13  - denies SI/HI         Patient's shoes and socks removed  Right Foot/Ankle   Right Foot Inspection  Skin Exam: skin normal and skin intact no dry skin, no warmth, no callus, no erythema, no maceration, no abnormal color, no pre-ulcer, no ulcer and no callus                          Toe Exam: ROM and strength within normal limitsno swelling, no tenderness, erythema and  no right toe deformity  Sensory       Monofilament testing: diminished  Vascular    The right DP pulse is 2+  Left Foot/Ankle  Left Foot Inspection  Skin Exam: skin normal and skin intactno dry skin, no warmth, no erythema, no maceration, normal color, no pre-ulcer, no ulcer and no callus                         Toe Exam: ROM and strength within normal limitsno swelling, no tenderness, no erythema and no left toe deformity                   Sensory       Monofilament: diminished  Vascular    The left DP pulse is 2+  Assign Risk Category:  No deformity present; No loss of protective sensation; No weak pulses       Risk: 0        BMI Counseling: Body mass index is 33 07 kg/m²  The BMI is above normal  Nutrition recommendations include reducing portion sizes and decreasing overall calorie intake  Exercise recommendations include moderate aerobic physical activity for 150 minutes/week, exercising 3-5 times per week, joining a gym and strength training exercises  Depression Screening Follow-up Plan: Patient's depression screening was positive with a PHQ-2 score of 1  Their PHQ-9 score was 13  Patient assessed for underlying major depression  They have no active suicidal ideations  Brief counseling provided and recommend additional follow-up/re-evaluation next office visit  Continue regular follow-up with their psychologist/therapist/psychiatrist who is managing their mental health condition(s)

## 2021-04-30 ENCOUNTER — APPOINTMENT (OUTPATIENT)
Dept: RADIOLOGY | Facility: AMBULARY SURGERY CENTER | Age: 36
End: 2021-04-30
Attending: ORTHOPAEDIC SURGERY
Payer: COMMERCIAL

## 2021-04-30 ENCOUNTER — CONSULT (OUTPATIENT)
Dept: OBGYN CLINIC | Facility: CLINIC | Age: 36
End: 2021-04-30
Payer: COMMERCIAL

## 2021-04-30 VITALS
HEART RATE: 79 BPM | BODY MASS INDEX: 33.04 KG/M2 | HEIGHT: 61 IN | DIASTOLIC BLOOD PRESSURE: 72 MMHG | WEIGHT: 175 LBS | SYSTOLIC BLOOD PRESSURE: 106 MMHG

## 2021-04-30 DIAGNOSIS — M70.61 TROCHANTERIC BURSITIS OF RIGHT HIP: Primary | ICD-10-CM

## 2021-04-30 DIAGNOSIS — M54.16 LUMBAR RADICULOPATHY: ICD-10-CM

## 2021-04-30 DIAGNOSIS — M25.551 HIP PAIN, ACUTE, RIGHT: ICD-10-CM

## 2021-04-30 PROCEDURE — 72110 X-RAY EXAM L-2 SPINE 4/>VWS: CPT

## 2021-04-30 PROCEDURE — 99244 OFF/OP CNSLTJ NEW/EST MOD 40: CPT | Performed by: ORTHOPAEDIC SURGERY

## 2021-04-30 PROCEDURE — 73502 X-RAY EXAM HIP UNI 2-3 VIEWS: CPT

## 2021-04-30 RX ORDER — METHYLPREDNISOLONE 4 MG/1
TABLET ORAL
Qty: 21 TABLET | Refills: 1 | Status: SHIPPED | OUTPATIENT
Start: 2021-04-30 | End: 2021-04-30

## 2021-04-30 RX ORDER — METHYLPREDNISOLONE 4 MG/1
TABLET ORAL
Qty: 21 TABLET | Refills: 1 | Status: SHIPPED | OUTPATIENT
Start: 2021-04-30 | End: 2021-08-20

## 2021-04-30 NOTE — PROGRESS NOTES
Assessment:  1  Trochanteric bursitis of right hip  Ambulatory referral to Pain Management    Ambulatory referral to Physical Therapy    methylPREDNISolone 4 MG tablet therapy pack   2  Hip pain, acute, right  Ambulatory referral to Orthopedic Surgery    XR hip/pelv 2-3 vws right if performed    Ambulatory referral to Pain Management    Ambulatory referral to Physical Therapy    methylPREDNISolone 4 MG tablet therapy pack   3  Lumbar radiculopathy  XR spine lumbar minimum 4 views non injury     Patient Active Problem List   Diagnosis    Dependence on nicotine from cigarettes    STELLA (generalized anxiety disorder)    Panic attacks    Class 1 obesity    Anxiety with depression    Elevated alkaline phosphatase level    Current every day smoker    GERD (gastroesophageal reflux disease)    Hydronephrosis with ureteropelvic junction (UPJ) obstruction    Kidney stone on left side    Ureteral calculus, left    Left ureteral stone           Plan       offer cortisone injection into the greater trochanteric bursa patient declined   Referral to physical therapy to work on low back and hip issues   Referral to Pain Management for possible injection versus the other further workup regarding lumbar radiculopathy specifically lower extremity weakness in the EHL and dorsiflexion of the foot and decreased sensation in the anterior medial aspect of leg  no further orthopedic intervention            Subjective:     Patient ID:    Chief Olivergermaine Arrieta 39 y o  female      HPI    Patient comes in today with regards to Right hip pain patient points to her greater trochanteric region  It does go down the leg and sometimes to the knee and sometimes in going to the foot  The patient reports that the pain is in the   Lateral aspect of the hip down the lateral aspect of the leg and sometimes into the foot and has been going on for  12 years    The pain is rated at0 at its best and10 at its worst   The pain is described as  throbbing  It is worsened with  walking, and is made better with  sitting  The patient has taken  Tylenol for treatment        The following portions of the patient's history were reviewed and updated as appropriate: allergies, current medications, past family history, past social history, past surgical history and problem list         Social History     Socioeconomic History    Marital status: /Civil Union     Spouse name: Not on file    Number of children: Not on file    Years of education: Not on file    Highest education level: Not on file   Occupational History    Occupation: Housewife or [address] resource strain: Not on file    Food insecurity     Worry: Not on file     Inability: Not on file   [employer] needs     Medical: Not on file     Non-medical: Not on file   Tobacco Use    Smoking status: Former Smoker     Packs/day: 0 25     Years: 20 00     Pack years: 5 00     Types: Cigarettes     Quit date: 2021     Years since quittin 0    Smokeless tobacco: Never Used    Tobacco comment: currently 7cigs/day   Substance and Sexual Activity    Alcohol use: Yes     Frequency: Monthly or less     Drinks per session: 1 or 2     Comment: monthly    Drug use: Never    Sexual activity: Yes     Partners: Male     Comment: declined STD screening    Lifestyle    Physical activity     Days per week: Not on file     Minutes per session: Not on file    Stress: Not on file   Relationships    Social connections     Talks on phone: Not on file     Gets together: Not on file     Attends Holiness service: Not on file     Active member of club or organization: Not on file     Attends meetings of clubs or organizations: Not on file     Relationship status: Not on file    Intimate partner violence     Fear of current or ex partner: Not on file     Emotionally abused: Not on file     Physically abused: Not on file     Forced sexual activity: Not on file   Other Topics Concern    Not on file   Social History Narrative    Always uses seat belt    Caffeine use    [employer] (disciples of [employer])     Past Medical History:   Diagnosis Date    Acid reflux     Allergic     seasonal    Anxiety     Blurred vision     Fainting     Headache     Irregular heart beat     Palpitations    Loss of appetite     Obesity     Spontaneous vaginal delivery     x3    Weakness      Past Surgical History:   Procedure Laterality Date    FL RETROGRADE PYELOGRAM  1/4/2020    FL RETROGRADE PYELOGRAM  1/22/2020    NO PAST SURGERIES      MA CYSTO/URETERO W/LITHOTRIPSY &INDWELL STENT INSRT Left 1/4/2020    Procedure: CYSTOSCOPY URETEROSCOPY , RETROGRADE PYELOGRAM AND INSERTION STENT URETERAL;  Surgeon: Lorraine Mai MD;  [facility];  Service: Urology    MA CYSTO/URETERO W/LITHOTRIPSY &INDWELL STENT INSRT Left 1/22/2020    Procedure: CYSTOSCOPY URETEROSCOPY WITH LITHOTRIPSY HOLMIUM LASER, RETROGRADE PYELOGRAM AND INSERTION STENT URETERAL;  Surgeon: Annamarie Mtz MD;  [facility];  Service: Urology     Allergies   Allergen Reactions    Other Itching     apples     Current Outpatient Medications on File Prior to Visit   Medication Sig Dispense Refill    Blood Glucose Monitoring Suppl (OneTouch Verio Reflect) w/Device KIT Use 1 kit daily at bedtime E11 65, Please substitute with appropriate alternative as covered by patient's insurance  1 kit 0    buPROPion (WELLBUTRIN XL) 150 mg 24 hr tablet Take 1 tablet (150 mg total) by mouth daily 30 tablet 1    gabapentin (NEURONTIN) 100 mg capsule Take 1 capsule (100 mg total) by mouth daily at bedtime 30 capsule 2    glucose blood (OneTouch Verio) test strip E11 65, Please substitute with appropriate alternative as covered by patient's insurance   100 each 11    insulin glargine (Lantus SoloStar) 100 units/mL injection pen Inject 20 Units under the skin daily at bedtime 3 mL 0    Insulin Pen Needle (Easy Touch Pen Needles) 31G X 8 MM MISC Use daily at bedtime 100 each 2    lisinopril (ZESTRIL) 2 5 mg tablet Take 1 tablet (2 5 mg total) by mouth daily 30 tablet 0    metFORMIN (GLUCOPHAGE) 1000 MG tablet Take 1 tablet (1,000 mg total) by mouth 2 (two) times a day with meals 60 tablet 0    OneTouch Delica Lancets 12V MISC Use daily at bedtime E11 65, Please substitute with appropriate alternative as covered by patient's insurance  100 each 11    rosuvastatin (CRESTOR) 5 mg tablet Take 1 tablet (5 mg total) by mouth daily 30 tablet 0     No current facility-administered medications on file prior to visit  Objective:    Review of Systems   Constitutional: Negative  HENT: Negative  Eyes: Negative  Respiratory: Negative  Cardiovascular: Negative  Gastrointestinal: Negative  Endocrine: Negative  Genitourinary: Negative  Musculoskeletal:        See hpi   Skin: Negative  Allergic/Immunologic: Negative  Neurological: Negative  Hematological: Negative  Psychiatric/Behavioral: Negative  Back Exam     Tenderness   The patient is experiencing tenderness in the lumbar  Range of Motion   The patient has normal back ROM  Muscle Strength   Right Quadriceps:  4/5   Left Quadriceps:  5/5     Tests   Straight leg raise right: positive at 80 deg    Other   Sensation: decreased    Comments:  Decreased sensation L3-4 decreased strength in extensor hallucis as well as dorsiflexion the foot at 4/5 compared to 5/5 no tenderness over the sacroiliac joint sciatic notch but does have tenderness over the greater trochanter lumbosacral junction L4-L5            Physical Exam  Vitals signs and nursing note reviewed  Constitutional:       Appearance: She is well-developed  HENT:      Head: Normocephalic  Eyes:      Pupils: Pupils are equal, round, and reactive to light  Neck:      Musculoskeletal: Normal range of motion  Cardiovascular:      Rate and Rhythm: Normal rate  Pulmonary:      Effort: Pulmonary effort is normal    Abdominal:      General: There is no distension  Skin:     General: Skin is warm  Neurological:      Mental Status: She is alert and oriented to person, place, and time  Procedures  No Procedures performed today    I have personally reviewed pertinent films in PACS and my interpretation is Patient does have some scoliosis in the lumbar spine some degenerative change but we are going to get devoted x-rays of the lumbar spine x-rays of the pelvis looks normal no arthritis in the hips  x-ray of the lumbar spine show a mild scoliosis no anterior listhesis and good disc space height      Portions of the record may have been created with voice recognition software   Occasional wrong word or "sound a like" substitutions may have occurred due to the inherent limitations of voice recognition software   Read the chart carefully and recognize, using context, where substitutions have occurred

## 2021-05-05 ENCOUNTER — OFFICE VISIT (OUTPATIENT)
Dept: DIABETES SERVICES | Facility: CLINIC | Age: 36
End: 2021-05-05
Payer: COMMERCIAL

## 2021-05-05 VITALS — BODY MASS INDEX: 33 KG/M2 | WEIGHT: 174.8 LBS | HEIGHT: 61 IN

## 2021-05-05 DIAGNOSIS — E11.9 NEWLY DIAGNOSED DIABETES (HCC): ICD-10-CM

## 2021-05-05 PROCEDURE — 97802 MEDICAL NUTRITION INDIV IN: CPT | Performed by: DIETITIAN, REGISTERED

## 2021-05-05 NOTE — PATIENT INSTRUCTIONS
30 grams of carbohydrate per meal  15 grams of carbohydrate per snack    3 meals per day, 4-5 hours apart  3 snacks per day, at least 2 hours apart from meals    Use the Portion Book and label reading to determine carbohydrate amounts in food and beverages  Please complete 3 day food record prior to follow-up appointment in 6 weeks

## 2021-05-05 NOTE — PROGRESS NOTES
Medical Nutrition Therapy        Assessment    Visit Type: Initial visit    Chief complaint T2DM    HPI: 39year old female newly diagnosed with diabetes  Most recent HbA1c 11 7%  Glucometer was downloaded from last 2 weeks and scanned under media  [employer] download reveals average  mg/dL with  mg/dL, range 102-484 mg/dL  [facility] diet history reveals inconsistent carbohydrate intake  Currently meals reported during diet recall range from 15 to 30 grams of carbohydrate  However, her meals and snacks reported during the recall do not align with the BG readings seen on her [employer]  She is currently on a steroid which should be taken into account, however, the [facility] download reveals frequent BG readings in the 300-500 mg/dL range  Explained basic pathophysiology of diabetes and impact of diet on blood glucose levels  Together we discussed what foods contain CHO, reading a food label, timing of meals and snacks, serving sizes, the role of fiber in glycemic control, and the role of consistent carbohydrate intake in achieving and maintaining target BG levels  Used the portion booklet to teach Felicity Dumont more about food groups and basic carbohydrate counting  Created an individualized meal plan for Gilma with 3 meals and 3 snacks providing 30 g carb per meal and 15 g carb per snack  Recommended 5-6 oz of protein per day and 4-5 servings of dietary fats per day  This plan will help promote weight loss  Put together sample meals for Torie's reference  Felicity Dumont demonstrated good understanding and will call with any questions prior to follow-up appointment in 6 weeks      Ht Readings from Last 1 Encounters:   04/30/21 5' 1" (1 549 m)     Wt Readings from Last 2 Encounters:   04/30/21 79 4 kg (175 lb)   04/16/21 79 4 kg (175 lb)     Weight Change: No    Medical Diagnosis/ICD10: E11 9 (ICD-10-CM) - Newly diagnosed diabetes    Barriers to Learning: no barriers    Do you follow any special diet presently?: Yes - avoiding carbohydrate and has stopped drinking juice  Who shops: patient  Who cooks: patient    Food Log: Completed via the method of food recall    Breakfast:wakes 5:30 am  Eats 8 am slim fast shake diabetic weight loss (1 scoop and 1 cup of silk mil unsweetened) OR a boiled egg and an oatmeal packet (flavored apple cinnamon)  Water  Morning Snack10 am :sugar free chocolate pudding   Lunch:12-1 pm 1 cup beef stew (homemade with carrots green beans, corn, no potatoes) Water (flavored seltezer)  Afternoon Snack: 3-4 pm dole mixed fruit cup and kettle corn (single serving bag)  Dinner:5:30 pm  Cold cuts sandwich (salami and ham on honey whole wheat) with 1 slice pepperjack cheese, 1 tsp han, 1/2 tsp mustard  Salad (lettuce, tomato, onion, 1/4 avocado) with 1 tbsp Lao or blue cheese dressing  Flvored or sparkling water  Evening Snack:sometimes  8 pm 1/4 cup salted mixed nuts (pretzel and nut mix)  Flavored seltzer water  Beverages: flavored seltzer, sparkling water    Eating out/Take out:none  Exercise trying to get to 10,000 steps per day, usually gets 1 hour of walking in the morning most days    Calorie needs 1,200 - 1,450 kcals/day Carbs: 30 g/meal, 15 g/snack         Nutrition Diagnosis:  Inconsistent carbohydrate intake  intake related to Food and nutrition related knowledge deficit concerning appropriate amount and timing of carbohydrate intake as evidenced by  Estimated carbohydrate intake that is different from recommended types or ingested on an irregular basis    Intervention: plate method, increased fiber intake, label reading, carbohydrate counting, increased plant based foods, meal timing, meal planning, individualized meal plan, monitoring portion control and food diary     Treatment Goals: Patient understands education and recommendations, Patient will monitor fat intake, Patient will monitor portion control, Patient will consume 25-35 grams of fiber a day, Patient will increase their intake of plant based foods and Patient will count carbohydrates    Monitoring and evaluation:    Term code indicator  FH 1 6 3 Carbohydrate Intake Criteria: 30 g of carbohydrate per meal, 15 g of carbohydrae per snack  Term code indicator  FH 4 4 Mealtime Behavior Criteria: 3 meals per day, 4-5 hours apart  3 snacks per day, at least 2 hours apart from meals  Patients Response to Instruction:  Rhonda Brambila  Expected Compliancegood    Thank you for coming to the [facility] for education today  Please feel free to call with any questions or concerns      Start: 9:25 am  Stop: 10:43 am  Referred by: MD Lalita Owens, [address] [place] [phone]

## 2021-05-11 ENCOUNTER — OFFICE VISIT (OUTPATIENT)
Dept: OBGYN CLINIC | Facility: CLINIC | Age: 36
End: 2021-05-11
Payer: COMMERCIAL

## 2021-05-11 DIAGNOSIS — N97.0 ANOVULATION: Primary | ICD-10-CM

## 2021-05-11 PROCEDURE — 99213 OFFICE O/P EST LOW 20 MIN: CPT | Performed by: OBSTETRICS & GYNECOLOGY

## 2021-05-11 NOTE — PROGRESS NOTES
Assessment/Plan:         Diagnoses and all orders for this visit:    Anovulation  -     clomiPHENE (CLOMID) 50 mg tablet; Take 1 tablet (50 mg total) by mouth daily for 5 days          Subjective:      Patient ID: Danielle Schrader is a 39 y o  female  Patient is a 50-year-old  3 para 1 female who presents for review of her PCOS blood work  She has had abnormalities in her DHEA-S and testosterone levels  She has recently been diagnosed as a diabetic and has been taking metformin although she had insulin added to her regimen after having a blood sugar as high as 600  She also had an episode of menorrhagia, which resolved with  Tranexamic acid  Her periods have been slightly irregular and it is likely that she is not ovulating consistently  We discussed options risks and benefits, and she would like a trial of Clomid  She will start on the 3rd day of her next menstrual cycle  Will see her back for an ultrasound to assess follicle development on days 14-17  The following portions of the patient's history were reviewed and updated as appropriate: allergies, current medications, past family history, past medical history, past social history, past surgical history and problem list     Review of Systems   Constitutional: Negative for chills, diaphoresis, fatigue, fever and unexpected weight change  HENT: Negative for congestion, sinus pressure, sinus pain, tinnitus and trouble swallowing  Eyes: Negative for visual disturbance  Respiratory: Negative for cough, chest tightness and shortness of breath  Cardiovascular: Negative for chest pain, palpitations and leg swelling  Gastrointestinal: Negative for abdominal distention, abdominal pain, anal bleeding, constipation, diarrhea, nausea, rectal pain and vomiting  Endocrine: Negative for heat intolerance  Genitourinary: Negative for difficulty urinating, dysuria, flank pain, frequency, genital sores, hematuria and urgency     Musculoskeletal: Negative for arthralgias, back pain and joint swelling  Skin: Negative for rash  Allergic/Immunologic: Negative for environmental allergies and food allergies  Neurological: Negative for headaches  Hematological: Negative for adenopathy  Does not bruise/bleed easily  Psychiatric/Behavioral: Negative for decreased concentration and dysphoric mood  The patient is not nervous/anxious  Objective: There were no vitals taken for this visit  Physical Exam  Vitals signs and nursing note reviewed  Exam conducted with a chaperone present  Constitutional:       Appearance: Normal appearance  She is normal weight  HENT:      Head: Normocephalic and atraumatic  Nose: Nose normal    Eyes:      Conjunctiva/sclera: Conjunctivae normal    Pulmonary:      Effort: Pulmonary effort is normal    Abdominal:      General: Abdomen is flat  Palpations: Abdomen is soft  Musculoskeletal: Normal range of motion  Skin:     General: Skin is warm and dry  Neurological:      General: No focal deficit present  Mental Status: She is alert  Mental status is at baseline  Psychiatric:         Mood and Affect: Mood normal          Behavior: Behavior normal          Thought Content:  Thought content normal          Judgment: Judgment normal

## 2021-05-19 ENCOUNTER — OFFICE VISIT (OUTPATIENT)
Dept: FAMILY MEDICINE CLINIC | Facility: CLINIC | Age: 36
End: 2021-05-19
Payer: COMMERCIAL

## 2021-05-19 VITALS
OXYGEN SATURATION: 99 % | BODY MASS INDEX: 32.47 KG/M2 | SYSTOLIC BLOOD PRESSURE: 102 MMHG | RESPIRATION RATE: 16 BRPM | WEIGHT: 172 LBS | HEART RATE: 87 BPM | HEIGHT: 61 IN | DIASTOLIC BLOOD PRESSURE: 70 MMHG

## 2021-05-19 DIAGNOSIS — H00.014 HORDEOLUM EXTERNUM OF LEFT UPPER EYELID: ICD-10-CM

## 2021-05-19 DIAGNOSIS — E11.9 NEWLY DIAGNOSED DIABETES (HCC): Primary | ICD-10-CM

## 2021-05-19 DIAGNOSIS — E55.9 VITAMIN D DEFICIENCY: ICD-10-CM

## 2021-05-19 DIAGNOSIS — E78.5 HYPERLIPIDEMIA LDL GOAL <70: ICD-10-CM

## 2021-05-19 DIAGNOSIS — E11.65 TYPE 2 DIABETES MELLITUS WITH HYPERGLYCEMIA, UNSPECIFIED WHETHER LONG TERM INSULIN USE (HCC): ICD-10-CM

## 2021-05-19 LAB — SL AMB POCT GLUCOSE BLD: 125

## 2021-05-19 PROCEDURE — 99215 OFFICE O/P EST HI 40 MIN: CPT | Performed by: FAMILY MEDICINE

## 2021-05-19 PROCEDURE — 82948 REAGENT STRIP/BLOOD GLUCOSE: CPT | Performed by: FAMILY MEDICINE

## 2021-05-19 RX ORDER — PEN NEEDLE, DIABETIC 31 GX5/16"
NEEDLE, DISPOSABLE MISCELLANEOUS
Qty: 100 EACH | Refills: 2 | Status: SHIPPED | OUTPATIENT
Start: 2021-05-19 | End: 2021-11-19 | Stop reason: SDUPTHER

## 2021-05-19 RX ORDER — LISINOPRIL 2.5 MG/1
2.5 TABLET ORAL DAILY
Qty: 90 TABLET | Refills: 0 | Status: SHIPPED | OUTPATIENT
Start: 2021-05-19 | End: 2021-08-20

## 2021-05-19 RX ORDER — BLOOD SUGAR DIAGNOSTIC
STRIP MISCELLANEOUS
Qty: 100 EACH | Refills: 11 | Status: SHIPPED | OUTPATIENT
Start: 2021-05-19

## 2021-05-19 RX ORDER — ROSUVASTATIN CALCIUM 5 MG/1
5 TABLET, COATED ORAL DAILY
Qty: 90 TABLET | Refills: 0 | Status: SHIPPED | OUTPATIENT
Start: 2021-05-19 | End: 2021-08-20

## 2021-05-19 RX ORDER — INSULIN GLARGINE 100 [IU]/ML
20 INJECTION, SOLUTION SUBCUTANEOUS
Qty: 3 ML | Refills: 0 | Status: SHIPPED | OUTPATIENT
Start: 2021-05-19 | End: 2021-08-18

## 2021-05-19 NOTE — PROGRESS NOTES
Assessment/Plan:   Diagnoses and all orders for this visit:    Newly diagnosed diabetes ([address] )  -     insulin glargine (Lantus SoloStar) 100 units/mL injection pen; Inject 20 Units under the skin daily at bedtime  -     Insulin Pen Needle (Easy Touch Pen Needles) 31G X 8 MM MISC; Use daily at bedtime  -     metFORMIN (GLUCOPHAGE) 1000 MG tablet; Take 1 tablet (1,000 mg total) by mouth 2 (two) times a day with meals  Type 2 diabetes mellitus with hyperglycemia, unspecified whether long term insulin use (HCC)  -     POCT blood glucose  -     glucose blood (OneTouch Verio) test strip; E11 65, Please substitute with appropriate alternative as covered by patient's insurance  Check sugars TID  -     lisinopril (ZESTRIL) 2 5 mg tablet; Take 1 tablet (2 5 mg total) by mouth daily  -     HEMOGLOBIN A1C W/ EAG ESTIMATION; Future  -     Comprehensive metabolic panel; Future  -     Microalbumin / creatinine urine ratio; Future  - A1c 11 7 (4/1/2021)   - nml urine microalbumin   - random sugar in office = 124  - vision no longer blurry - did see Opthho 5/5/2021   - has been walking QD   - Mom is a nutritionist   - cont Meformin 1000mg BID and Lantus 20U QHS    - received PCV23 in the office 4/7/2021   - DM foot exam with slightly diminished sensation on 4/16/2021 - cont Gabapentin 100mg QHS   - cont ACEI 2 5mg QD for yvonne-protection   - had an appt with DM education on 5/5/2021 and has f/u scheduled   - has an appt with Endocrine on 8/18/2021  - advised to RTO with labs in 3months for f/u - pt aware and agreeable     Hyperlipidemia LDL goal <70  -     rosuvastatin (CRESTOR) 5 mg tablet; Take 1 tablet (5 mg total) by mouth daily  -     Lipid panel; Future  - Lipids (4/1/2021): , , HDL 82,    - cont Crestor 5mg QHS for better LDL control   - will repeat labs in 3months     BMI 32 0-32 9,adult  -     TSH, 3rd generation with Free T4 reflex;  Future  - BMI 32 5  - discussed diet and encouraged exercise  - educated that it takes 3500 nuria to lose 1lb  - advised to cut down on carbs, 5 servings of fruits/veggies/day, increase water intake (65-80oz/water/day)   - appropriate weight loss goal for women = 0 5-1lb/week  - per the Heart Association - 150mins/exercise/week, but to lose and maintain weight 200-300mins/exercise/week     Vitamin D deficiency  -     Cholecalciferol 1 25 MG (24786 UT) TABS; Take 1 tablet (50,000 Units total) by mouth once a week x12wks and then switch to OTC Vit D 2000IU QD  -     Vitamin D 25 hydroxy; Future    Hordeolum externum of left upper eyelid  - advised warm compresses QID         Subjective:    Patient ID: Ramiro Ferrari is a 39 y o  female  HPI   43yo F presents to the office for f/u   - did see Ortho on 4/30/2021 for eval of hip pain - was started on Medrol-dose pack but pt did not start - has appt with PM on 5/25/2021   - has been checking her sugars before and after meals   - random sugar in the office today 124   - per pt - her AM sugars have been under 125  - currently on Metformin 1000mg BID and Insulin 20U QHS and tolerating it well   - did see DM education on 5/5/2021 and has f/u scheduled for 6/16/2021  - trying to eat Q2-3hrs, better at carb counting   - "eyesight is a lot better" - no longer with blurry vision, but does have stye on her L-upper eyelid   - (+) Vit D deficiency   - denies F/C/N/V/CP/palpitations/SOB/wheezing/cough/abd pain/D/LE edema       The following portions of the patient's history were reviewed and updated as appropriate: allergies, current medications, past family history, past medical history, past social history, past surgical history and problem list     Review of Systems  as per HPI    Objective:  /70   Pulse 87   Resp 16   Ht 5' 1" (1 549 m)   Wt 78 kg (172 lb)   SpO2 99%   BMI 32 50 kg/m²    Physical Exam  Vitals signs reviewed  Constitutional:       General: She is not in acute distress  Appearance: She is obese   She is not ill-appearing, toxic-appearing or diaphoretic  HENT:      Head: Normocephalic and atraumatic  Right Ear: External ear normal       Left Ear: External ear normal    Eyes:      General: No scleral icterus  Right eye: No discharge  Left eye: No discharge  Extraocular Movements: Extraocular movements intact  Conjunctiva/sclera: Conjunctivae normal       Comments: Stye on L-upper eyelid    Neck:      Musculoskeletal: Normal range of motion and neck supple  Cardiovascular:      Rate and Rhythm: Normal rate and regular rhythm  Heart sounds: Normal heart sounds  No murmur  No friction rub  No gallop  Pulmonary:      Effort: Pulmonary effort is normal    Abdominal:      General: Bowel sounds are normal       Palpations: Abdomen is soft  Musculoskeletal: Normal range of motion  General: No swelling, tenderness, deformity or signs of injury  Right lower leg: No edema  Left lower leg: No edema  Skin:     General: Skin is warm  Neurological:      General: No focal deficit present  Mental Status: She is alert and oriented to person, place, and time  Psychiatric:         Mood and Affect: Mood normal          Behavior: Behavior normal        BMI Counseling: Body mass index is 32 5 kg/m²  The BMI is above normal  Nutrition recommendations include reducing portion sizes and decreasing overall calorie intake  Exercise recommendations include moderate aerobic physical activity for 150 minutes/week, exercising 3-5 times per week, joining a gym and strength training exercises  I have spent 30 minutes with Patient  today in which greater than 50% of this time was spent in counseling/coordination of care regarding Diagnostic results, Prognosis, Risks and benefits of tx options, Intructions for management, Patient and family education, Importance of tx compliance, Risk factor reductions and Impressions    - spent 10mins reviewing labs and c/s notes

## 2021-06-07 ENCOUNTER — OFFICE VISIT (OUTPATIENT)
Dept: OBGYN CLINIC | Facility: CLINIC | Age: 36
End: 2021-06-07
Payer: COMMERCIAL

## 2021-06-07 VITALS — SYSTOLIC BLOOD PRESSURE: 112 MMHG | DIASTOLIC BLOOD PRESSURE: 66 MMHG

## 2021-06-07 DIAGNOSIS — N97.0 ANOVULATION: Primary | ICD-10-CM

## 2021-06-07 PROCEDURE — 99213 OFFICE O/P EST LOW 20 MIN: CPT | Performed by: OBSTETRICS & GYNECOLOGY

## 2021-06-07 NOTE — PROGRESS NOTES
Assessment/Plan:    No problem-specific Assessment & Plan notes found for this encounter  There are no diagnoses linked to this encounter  Subjective:      Patient ID: Stefan Jeffery is a 39 y o  female  The patient is a 34-year-old  2 para 2 who presents following 50 mg dose of clomiphene citrate on day 15 of the menstrual cycle  This is her 1st cycle of Clomid and she will be evaluated for ovulation  And cultures and she has adequate follicular development  There is no free fluid visible in the cul-de-sac  There is a dominant 2 cm follicle on the left ovary  She feels fine and has no complaints, she denies pelvic pain, pressure, abnormal bleeding or any other symptoms  The following portions of the patient's history were reviewed and updated as appropriate: allergies, current medications, past family history, past medical history, past social history, past surgical history and problem list     Review of Systems   Constitutional: Negative for chills, diaphoresis, fatigue, fever and unexpected weight change  HENT: Negative for congestion, sinus pressure, sinus pain, tinnitus and trouble swallowing  Eyes: Negative for visual disturbance  Respiratory: Negative for cough, chest tightness and shortness of breath  Cardiovascular: Negative for chest pain, palpitations and leg swelling  Gastrointestinal: Negative for abdominal distention, abdominal pain, anal bleeding, constipation, diarrhea, nausea, rectal pain and vomiting  Endocrine: Negative for heat intolerance  Genitourinary: Negative for difficulty urinating, dysuria, flank pain, frequency, genital sores, hematuria and urgency  Musculoskeletal: Negative for arthralgias, back pain and joint swelling  Skin: Negative for rash  Allergic/Immunologic: Negative for environmental allergies and food allergies  Neurological: Negative for headaches  Hematological: Negative for adenopathy  Does not bruise/bleed easily  Psychiatric/Behavioral: Negative for decreased concentration and dysphoric mood  The patient is not nervous/anxious  Objective:      /66 (BP Location: Left arm)   LMP 05/22/2021          Physical Exam  Vitals signs and nursing note reviewed  Exam conducted with a chaperone present  Constitutional:       Appearance: Normal appearance  She is normal weight  HENT:      Head: Normocephalic and atraumatic  Nose: Nose normal    Eyes:      Conjunctiva/sclera: Conjunctivae normal    Pulmonary:      Effort: Pulmonary effort is normal    Abdominal:      General: Abdomen is flat  Palpations: Abdomen is soft  Musculoskeletal: Normal range of motion  Skin:     General: Skin is warm and dry  Neurological:      General: No focal deficit present  Mental Status: She is alert  Mental status is at baseline  Psychiatric:         Mood and Affect: Mood normal          Behavior: Behavior normal          Thought Content:  Thought content normal          Judgment: Judgment normal

## 2021-08-16 ENCOUNTER — APPOINTMENT (OUTPATIENT)
Dept: LAB | Facility: CLINIC | Age: 36
End: 2021-08-16
Payer: COMMERCIAL

## 2021-08-16 DIAGNOSIS — E78.5 HYPERLIPIDEMIA LDL GOAL <70: ICD-10-CM

## 2021-08-16 DIAGNOSIS — E55.9 VITAMIN D DEFICIENCY: ICD-10-CM

## 2021-08-16 DIAGNOSIS — E11.65 TYPE 2 DIABETES MELLITUS WITH HYPERGLYCEMIA, UNSPECIFIED WHETHER LONG TERM INSULIN USE (HCC): ICD-10-CM

## 2021-08-16 LAB
25(OH)D3 SERPL-MCNC: 21.2 NG/ML (ref 30–100)
ALBUMIN SERPL BCP-MCNC: 3.2 G/DL (ref 3.5–5)
ALP SERPL-CCNC: 333 U/L (ref 46–116)
ALT SERPL W P-5'-P-CCNC: 75 U/L (ref 12–78)
ANION GAP SERPL CALCULATED.3IONS-SCNC: 10 MMOL/L (ref 4–13)
AST SERPL W P-5'-P-CCNC: 40 U/L (ref 5–45)
BILIRUB SERPL-MCNC: 0.48 MG/DL (ref 0.2–1)
BUN SERPL-MCNC: 9 MG/DL (ref 5–25)
CALCIUM ALBUM COR SERPL-MCNC: 10.5 MG/DL (ref 8.3–10.1)
CALCIUM SERPL-MCNC: 9.9 MG/DL (ref 8.3–10.1)
CHLORIDE SERPL-SCNC: 103 MMOL/L (ref 100–108)
CHOLEST SERPL-MCNC: 244 MG/DL (ref 50–200)
CO2 SERPL-SCNC: 26 MMOL/L (ref 21–32)
CREAT SERPL-MCNC: 0.76 MG/DL (ref 0.6–1.3)
CREAT UR-MCNC: 146 MG/DL
EST. AVERAGE GLUCOSE BLD GHB EST-MCNC: 134 MG/DL
GFR SERPL CREATININE-BSD FRML MDRD: 116 ML/MIN/1.73SQ M
GLUCOSE P FAST SERPL-MCNC: 129 MG/DL (ref 65–99)
HBA1C MFR BLD: 6.3 %
HDLC SERPL-MCNC: 70 MG/DL
LDLC SERPL CALC-MCNC: 133 MG/DL (ref 0–100)
MICROALBUMIN UR-MCNC: 16.3 MG/L (ref 0–20)
MICROALBUMIN/CREAT 24H UR: 11 MG/G CREATININE (ref 0–30)
NONHDLC SERPL-MCNC: 174 MG/DL
POTASSIUM SERPL-SCNC: 4 MMOL/L (ref 3.5–5.3)
PROT SERPL-MCNC: 8.6 G/DL (ref 6.4–8.2)
SODIUM SERPL-SCNC: 139 MMOL/L (ref 136–145)
TRIGL SERPL-MCNC: 206 MG/DL
TSH SERPL DL<=0.05 MIU/L-ACNC: 2.48 UIU/ML (ref 0.36–3.74)

## 2021-08-16 PROCEDURE — 82306 VITAMIN D 25 HYDROXY: CPT

## 2021-08-16 PROCEDURE — 82043 UR ALBUMIN QUANTITATIVE: CPT

## 2021-08-16 PROCEDURE — 80061 LIPID PANEL: CPT

## 2021-08-16 PROCEDURE — 84443 ASSAY THYROID STIM HORMONE: CPT

## 2021-08-16 PROCEDURE — 82570 ASSAY OF URINE CREATININE: CPT

## 2021-08-16 PROCEDURE — 80053 COMPREHEN METABOLIC PANEL: CPT

## 2021-08-16 PROCEDURE — 83036 HEMOGLOBIN GLYCOSYLATED A1C: CPT

## 2021-08-16 PROCEDURE — 36415 COLL VENOUS BLD VENIPUNCTURE: CPT

## 2021-08-18 ENCOUNTER — APPOINTMENT (OUTPATIENT)
Dept: LAB | Facility: CLINIC | Age: 36
End: 2021-08-18
Payer: COMMERCIAL

## 2021-08-18 ENCOUNTER — CONSULT (OUTPATIENT)
Dept: ENDOCRINOLOGY | Facility: CLINIC | Age: 36
End: 2021-08-18
Payer: COMMERCIAL

## 2021-08-18 VITALS
HEART RATE: 89 BPM | TEMPERATURE: 98.7 F | BODY MASS INDEX: 32.54 KG/M2 | SYSTOLIC BLOOD PRESSURE: 122 MMHG | DIASTOLIC BLOOD PRESSURE: 82 MMHG | WEIGHT: 172.2 LBS

## 2021-08-18 DIAGNOSIS — E11.9 NEWLY DIAGNOSED DIABETES (HCC): ICD-10-CM

## 2021-08-18 PROCEDURE — 83519 RIA NONANTIBODY: CPT

## 2021-08-18 PROCEDURE — 99204 OFFICE O/P NEW MOD 45 MIN: CPT | Performed by: INTERNAL MEDICINE

## 2021-08-18 PROCEDURE — 84681 ASSAY OF C-PEPTIDE: CPT

## 2021-08-18 PROCEDURE — 86341 ISLET CELL ANTIBODY: CPT

## 2021-08-18 PROCEDURE — 36415 COLL VENOUS BLD VENIPUNCTURE: CPT

## 2021-08-18 RX ORDER — INSULIN GLARGINE 100 [IU]/ML
10 INJECTION, SOLUTION SUBCUTANEOUS
Qty: 3 ML | Refills: 0
Start: 2021-08-18 | End: 2021-11-12 | Stop reason: SDUPTHER

## 2021-08-18 NOTE — PROGRESS NOTES
Pepe Braswell 39 y o  female MRN: [id number]    Encounter: [id number]      Assessment/Plan     Assessment: This is a 39y o -year-old female with diabetes without hyperglycemia  Plan:  1  Diabetes   · Lantus from 20 to 10 units  · Continue Metformin 500 BID  · Will likely need to continue insulin as she is trying to become pregnant, will continue insulin  Avoid GLP analogs  · Has been asked to inform us if she misses a period or become pregnant  · Call if sugars continues to be <70  · Labs ordered to rule out Type 1 diabetes: C peptide, Glutamine decaroxylase, Islet Ab  · Labs for next visit to be done in 3 months: micro/creat ratio, HBa1c, Lipid Panel, BMP    2  Anovulation  · Continue Clomid as patient is trying to get pregnant  3  Increasing ALP   · Asked to follow up with PCP  Nutrition Assessment and Intervention:     Reviewed food recall journal    Reviewed and updated Nutrition Prescription      Physical Activity Assessment and Intervention:    Activity journal reviewed      CC: Diabetes    History of Present Illness     HPI:    · 79-year-old female was diagnosed with diabetes Type 2 on 04/01/2021 without HbA1c of 11 7  Improved to 6 3 on 08/16/2021 with insulin and metformin  · Orally reports  · Fasting sugars less than 125  · Before lunch- 67-85  · 2 hours after lunch-135s  · Before dinner-120s  · 2 hours after dinners-120- 175  · No bedtime checking     · Continues to feel fatigued, had initially lost weight then regained it, headache,   Has polyuria, No frequency of urination  · Currently onL Lantus 20 units q h s , metformin 500 BID  No mealtime insulin  Uses pens  · Check sugars 5 times daily  · Glucometer-Unknown  · Rotate sites-right side of abdomen is painful therefore uses was side abdomen or outer thigh  · Hypoglycemia-5 events since April of 2021  Lowest was 47  Improved with food intake  Lightheaded, tired, tremors, blurry vision, no syncope  · Hyperglycemia- initially sugars were 600s, then with insulin drip to adduction this decreased to now highest is around 180s, once up to 214       · Exercise-4 times a week gym, largely cardio based  · Diet-would have large portion based meals though infrequent, plenty of juice  Currently eat salads, vegetables, chicken/ Fish/steak  Mostly home cooked food  Has plenty significantly  Drink plenty of water  · Diabetes Education-completed  · Weight management referral provided by primary-did not find it helpful  · DHEA on 04/01/2021 was 369  Regular menses  · Prolactin on 09/07/2019 was 27 5  · History: No abdominal surgery  Both grandmothers have diabetes  Parents and sibling do not have diabetes  No history of gallbladder issues  Anovulation, trying to get pregnancy  · Is on clomid for ovulation as she is anovulatory  Is trying to get pregnant  · Has child    Rising ALP  · Is concerned about her increasing ALP  Review of Systems   Constitutional: Negative for chills and fever  HENT: Negative for ear pain and sore throat  Eyes: Negative for pain and visual disturbance  Respiratory: Negative for cough and shortness of breath  Cardiovascular: Negative for chest pain and palpitations  Gastrointestinal: Negative for abdominal pain and vomiting  Genitourinary: Negative for dysuria and hematuria  Musculoskeletal: Negative for arthralgias and back pain  Skin: Negative for color change and rash  Neurological: Positive for light-headedness  Negative for syncope  Psychiatric/Behavioral: Negative for confusion  All other systems reviewed and are negative        Historical Information   Past Medical History:   Diagnosis Date    Acid reflux     Allergic     seasonal    Anxiety     Blurred vision     Fainting     Headache     Irregular heart beat     Palpitations    Loss of appetite     Obesity     Spontaneous vaginal delivery     x3    Weakness      Past Surgical History:   Procedure Laterality Date    FL RETROGRADE PYELOGRAM  2020    FL RETROGRADE PYELOGRAM  2020    NO PAST SURGERIES      RI CYSTO/URETERO W/LITHOTRIPSY &INDWELL STENT INSRT Left 2020    Procedure: CYSTOSCOPY URETEROSCOPY , RETROGRADE PYELOGRAM AND INSERTION STENT URETERAL;  Surgeon: Ash Gaviria MD;  [facility];  Service: Urology    RI CYSTO/URETERO W/LITHOTRIPSY &INDWELL STENT INSRT Left 2020    Procedure: CYSTOSCOPY URETEROSCOPY WITH LITHOTRIPSY HOLMIUM LASER, RETROGRADE PYELOGRAM AND INSERTION STENT URETERAL;  Surgeon: Soledad Cruz MD;  [facility];  Service: Urology     Social History   Social History     Substance and Sexual Activity   Alcohol Use Yes    Comment: monthly     Social History     Substance and Sexual Activity   Drug Use Never     Social History     Tobacco Use   Smoking Status Former Smoker    Packs/day: 0 25    Years: 20 00    Pack years: 5 00    Types: Cigarettes    Quit date: 2021    Years since quittin 3   Smokeless Tobacco Never Used   Tobacco Comment    currently 7cigs/day     Family History:   Family History   Problem Relation Age of Onset    Hypertension Mother     Breast cancer Maternal Grandmother     Diabetes Maternal Grandmother     Thyroid disease Other     Autism Other     Anemia Father     Leukemia Sister     Diabetes Paternal Grandmother     Fibroids Sister     No Known Problems Brother     No Known Problems Sister     No Known Problems Sister     Multiple sclerosis Cousin     Colon cancer Neg Hx     Cervical cancer Neg Hx     Stroke Neg Hx        Meds/Allergies   Current Outpatient Medications   Medication Sig Dispense Refill    Blood Glucose Monitoring Suppl (OneTouch Verio Reflect) w/Device KIT Use 1 kit daily at bedtime E11 65, Please substitute with appropriate alternative as covered by patient's insurance   1 kit 0    Cholecalciferol 1 25 MG (75477 UT) TABS Take 1 tablet (50,000 Units total) by mouth once a week x12wks and then switch to OTC Vit D 2000IU QD 12 tablet 0    clomiPHENE (CLOMID) 50 mg tablet Take 1 tablet (50 mg total) by mouth daily for 5 days 5 tablet 3    glucose blood (OneTouch Verio) test strip E11 65, Please substitute with appropriate alternative as covered by patient's insurance  Check sugars  each 11    insulin glargine (Lantus SoloStar) 100 units/mL injection pen Inject 20 Units under the skin daily at bedtime 3 mL 0    Insulin Pen Needle (Easy Touch Pen Needles) 31G X 8 MM MISC Use daily at bedtime 100 each 2    lisinopril (ZESTRIL) 2 5 mg tablet Take 1 tablet (2 5 mg total) by mouth daily 90 tablet 0    metFORMIN (GLUCOPHAGE) 1000 MG tablet Take 1 tablet (1,000 mg total) by mouth 2 (two) times a day with meals (Patient taking differently: Take 500 mg by mouth 2 (two) times a day with meals ) 180 tablet 0    OneTouch Delica Lancets 01E MISC Use daily at bedtime E11 65, Please substitute with appropriate alternative as covered by patient's insurance  100 each 11    rosuvastatin (CRESTOR) 5 mg tablet Take 1 tablet (5 mg total) by mouth daily 90 tablet 0    buPROPion (WELLBUTRIN XL) 150 mg 24 hr tablet Take 1 tablet (150 mg total) by mouth daily (Patient not taking: Reported on 8/18/2021) 30 tablet 1    gabapentin (NEURONTIN) 100 mg capsule Take 1 capsule (100 mg total) by mouth daily at bedtime (Patient not taking: Reported on 8/18/2021) 30 capsule 2    methylPREDNISolone 4 MG tablet therapy pack Use as directed on package (Patient not taking: Reported on 6/7/2021) 21 tablet 1     No current facility-administered medications for this visit  Allergies   Allergen Reactions    Other Itching     apples       Objective   Vitals: Blood pressure 122/82, pulse 89, temperature 98 7 °F (37 1 °C), weight 78 1 kg (172 lb 3 2 oz)  Physical Exam  Vitals and nursing note reviewed  Constitutional:       General: She is not in acute distress       Appearance: Normal appearance  She is obese  She is not ill-appearing, toxic-appearing or diaphoretic  Comments: Acanthosis Nigracans   HENT:      Head: Normocephalic and atraumatic  Eyes:      General:         Right eye: No discharge  Left eye: No discharge  Extraocular Movements: Extraocular movements intact  Cardiovascular:      Rate and Rhythm: Normal rate and regular rhythm  Pulses: Normal pulses  no weak pulses     Heart sounds: Normal heart sounds  Pulmonary:      Effort: Pulmonary effort is normal       Breath sounds: Normal breath sounds  Abdominal:      General: Abdomen is flat  There is no distension  Tenderness: There is no abdominal tenderness  There is no guarding  Musculoskeletal:      Cervical back: Neck supple  Right lower leg: No edema  Left lower leg: No edema  Feet:      Right foot:      Skin integrity: No ulcer, skin breakdown, erythema, warmth, callus or dry skin  Left foot:      Skin integrity: No ulcer, skin breakdown, erythema, warmth, callus or dry skin  Skin:     General: Skin is warm  Findings: No bruising  Comments: straie from pregnancy, no purple straie   Neurological:      General: No focal deficit present  Mental Status: She is alert and oriented to person, place, and time  Motor: No weakness  Psychiatric:         Mood and Affect: Mood normal           Patient's shoes and socks removed  Right Foot/Ankle   Right Foot Inspection  Skin Exam: skin normal and skin intact no dry skin, no warmth, no callus, no erythema, no maceration, no abnormal color, no pre-ulcer, no ulcer and no callus                          Toe Exam: ROM and strength within normal limits  Sensory   Vibration: intact  Proprioception: intact   Monofilament testing: intact      Left Foot/Ankle  Left Foot Inspection  Skin Exam: skin normal and skin intactno dry skin, no warmth, no erythema, no maceration, normal color, no pre-ulcer, no ulcer and no callus                         Toe Exam: ROM and strength within normal limits                   Sensory   Vibration: intact  Proprioception: intact  Monofilament: intact    Assign Risk Category:  No deformity present; No loss of protective sensation; No weak pulses       Risk: 0      The history was obtained from the review of the chart, patient  Lab Results:   Lab Results   Component Value Date/Time    Hemoglobin A1C 6 3 (H) 08/16/2021 06:09 AM    Hemoglobin A1C 11 7 (H) 04/01/2021 06:29 AM    WBC 7 76 04/01/2021 06:29 AM    Hemoglobin 13 5 04/01/2021 06:29 AM    Hematocrit 39 2 04/01/2021 06:29 AM    MCV 90 04/01/2021 06:29 AM    Platelets 053 (H) 90/49/8493 06:29 AM    BUN 9 08/16/2021 06:09 AM    BUN 9 04/01/2021 06:29 AM    Potassium 4 0 08/16/2021 06:09 AM    Potassium 4 0 04/01/2021 06:29 AM    Chloride 103 08/16/2021 06:09 AM    Chloride 95 (L) 04/01/2021 06:29 AM    CO2 26 08/16/2021 06:09 AM    CO2 28 04/01/2021 06:29 AM    Creatinine 0 76 08/16/2021 06:09 AM    Creatinine 0 92 04/01/2021 06:29 AM    AST 40 08/16/2021 06:09 AM    AST 36 04/01/2021 06:29 AM    ALT 75 08/16/2021 06:09 AM    ALT 54 04/01/2021 06:29 AM    Albumin 3 2 (L) 08/16/2021 06:09 AM    Albumin 3 5 04/01/2021 06:29 AM    HDL, Direct 70 08/16/2021 06:09 AM    HDL, Direct 82 04/01/2021 06:29 AM    Triglycerides 206 (H) 08/16/2021 06:09 AM    Triglycerides 258 (H) 04/01/2021 06:29 AM           Imaging Studies: I have personally reviewed pertinent reports  Portions of the record may have been created with voice recognition software  Occasional wrong word or "sound a like" substitutions may have occurred due to the inherent limitations of voice recognition software  Read the chart carefully and recognize, using context, where substitutions have occurred

## 2021-08-18 NOTE — PATIENT INSTRUCTIONS
Cut down Lantus from 20 units to 10 units at night  Please provide sugar log to office every 2 weeks   Please do labs now since you have been fasting  Call if sugars continues to be <70, do not wait 2 weeks

## 2021-08-19 ENCOUNTER — TELEPHONE (OUTPATIENT)
Dept: ENDOCRINOLOGY | Facility: CLINIC | Age: 36
End: 2021-08-19

## 2021-08-19 LAB — C PEPTIDE SERPL-MCNC: 5.2 NG/ML (ref 1.1–4.4)

## 2021-08-19 NOTE — TELEPHONE ENCOUNTER
Pt would like results of labs and want to go on the weight loss medication you were discussing yesterday

## 2021-08-19 NOTE — TELEPHONE ENCOUNTER
Please inform pt that she does not Qualify for that medication as she is trying to get pregnant     Soraida Ahuja MD

## 2021-08-19 NOTE — TELEPHONE ENCOUNTER
Harley Eric, she should send us the sugar log in 1 week, and will decide based on that   Thanks     Cesar Rey MD

## 2021-08-20 ENCOUNTER — OFFICE VISIT (OUTPATIENT)
Dept: FAMILY MEDICINE CLINIC | Facility: CLINIC | Age: 36
End: 2021-08-20
Payer: COMMERCIAL

## 2021-08-20 VITALS
OXYGEN SATURATION: 98 % | BODY MASS INDEX: 32.47 KG/M2 | SYSTOLIC BLOOD PRESSURE: 110 MMHG | HEIGHT: 61 IN | RESPIRATION RATE: 16 BRPM | HEART RATE: 102 BPM | DIASTOLIC BLOOD PRESSURE: 68 MMHG | WEIGHT: 172 LBS

## 2021-08-20 DIAGNOSIS — N97.0 ANOVULATION: ICD-10-CM

## 2021-08-20 DIAGNOSIS — Z87.891 FORMER SMOKER: ICD-10-CM

## 2021-08-20 DIAGNOSIS — E78.5 HYPERLIPIDEMIA LDL GOAL <70: ICD-10-CM

## 2021-08-20 DIAGNOSIS — E11.9 TYPE 2 DIABETES MELLITUS WITHOUT COMPLICATION, UNSPECIFIED WHETHER LONG TERM INSULIN USE (HCC): Primary | ICD-10-CM

## 2021-08-20 DIAGNOSIS — E55.9 VITAMIN D DEFICIENCY: ICD-10-CM

## 2021-08-20 LAB — GAD65 AB SER-ACNC: <5 U/ML (ref 0–5)

## 2021-08-20 PROCEDURE — 99214 OFFICE O/P EST MOD 30 MIN: CPT | Performed by: FAMILY MEDICINE

## 2021-08-20 NOTE — PROGRESS NOTES
Assessment/Plan:   Diagnoses and all orders for this visit:    Type 2 diabetes mellitus without complication, unspecified whether long term insulin use (HCC)  - A1c 6 3 <-- 11 7 (4/1/2021)   - did see Endo 8/18/2021 - currently on Metformin 500mg BID (decreased from 1000mg BID) and Insulin 10U QHS (down from 20U QHS)   - nml urine microalbumin (8/16/2021)   - no longer with blurry vision - did see Optho 5/5/2021   - has been walking QD   - Mom is a nutritionist   - did see DM education on 5/5/2021 and no f/u was advised   - UTD with DWAINE Crawford) at [facility] - received #2/2 on 8/8/2021   - UTD with PCV23 in the office 4/7/2021   - stopped taking Gabapentin  - following with Ob/Gyn and on Clomid as trying to conceive - advised to STOP ACEI and Statin   - RTO in 3months for f/u   Former smoker    Hyperlipidemia LDL goal <70  - Lipids: , , HLD 70,    - was started on Statin but advised to STOP as pt on Clomid and trying to conceive  - discussed diet and encouraged exercise    BMI 32 0-32 9,adult  - BMI 32 5  - discussed diet and encouraged exercise  - educated that it takes 3500 nuria to lose 1lb  - advised to cut down on carbs, 5 servings of fruits/veggies/day, increase water intake (65-80oz/water/day)   - appropriate weight loss goal for women = 0 5-1lb/week  - per the Heart Association - 150mins/exercise/week, but to lose and maintain weight 200-300mins/exercise/week    Vitamin D deficiency  -     Cholecalciferol 1 25 MG (21589 UT) TABS; Take 1 tablet (50,000 Units total) by mouth once a week x12wks and then switch to OTC Vit D 2000IU QD    Anovulation  - following with Ob/Gyn and on Clomid as trying to conceive        Subjective:    Patient ID: Ciara Gong is a 39 y o  female    HPI   45yo F presents to the office for f/u   1) Labs (8/16/2021)  - A1c 6 3 <-- 11 7 (4/1/2021)   - Lipids: , , HLD 70,    - Alk Phos: 333 <-- 362   - nml urine microalbumin   - Vit D 21 2 <-- 8 9   - nml TSH   2) DM2   - newly dx DM (was noted to have A1c of 11 7 on labs done 4/1/2021)   - did see Endo 8/18/2021   - currently on Metformin 500mg BID, Insulin 10U QHS, Crestor 5mg QHS, ACEI 2 5mg QD   - received DWAINE Crawford) at [facility] - received #2/2 on 8/8/2021   - FDLMP: 8/9/2021 - following with Ob/Gyn and on Clomid as trying to conceive  - last night her sugar spiked to 300 before bed (took Insulin and Metformin) - this    - did see DM education on 5/5/2021 and no f/u was advised   - eyes blurry yesterday but generally OK   - "I'm always nauseous"   - denies F/C/V/CP/palpitations/SOB/wheezing/cough/abd pain/D/LE edema       The following portions of the patient's history were reviewed and updated as appropriate: allergies, current medications, past family history, past medical history, past social history, past surgical history and problem list     Review of Systems  as per HPI    Objective:  /68 (BP Location: Left arm, Patient Position: Sitting, Cuff Size: Large)   Pulse 102   Resp 16   Ht 5' 1" (1 549 m)   Wt 78 kg (172 lb)   SpO2 98%   BMI 32 50 kg/m²    Physical Exam  Vitals reviewed  Constitutional:       General: She is not in acute distress  Appearance: She is obese  She is not ill-appearing, toxic-appearing or diaphoretic  HENT:      Head: Normocephalic and atraumatic  Right Ear: External ear normal       Left Ear: External ear normal    Eyes:      General: No scleral icterus  Right eye: No discharge  Left eye: No discharge  Extraocular Movements: Extraocular movements intact  Conjunctiva/sclera: Conjunctivae normal    Cardiovascular:      Rate and Rhythm: Normal rate and regular rhythm  Heart sounds: Normal heart sounds  No murmur heard  No friction rub  No gallop  Pulmonary:      Effort: Pulmonary effort is normal  No respiratory distress  Breath sounds: Normal breath sounds  No stridor   No wheezing, rhonchi or rales    Abdominal:      General: Bowel sounds are normal       Palpations: Abdomen is soft  Musculoskeletal:         General: Normal range of motion  Cervical back: Normal range of motion and neck supple  Right lower leg: No edema  Left lower leg: No edema  Skin:     General: Skin is warm  Neurological:      General: No focal deficit present  Mental Status: She is alert and oriented to person, place, and time  Psychiatric:         Attention and Perception: Attention and perception normal          Mood and Affect: Mood and affect normal  Mood is not anxious or depressed  Speech: Speech normal  She is communicative  Speech is not rapid and pressured  Behavior: Behavior normal  Behavior is cooperative  Thought Content: Thought content normal  Thought content does not include homicidal or suicidal ideation  Thought content does not include homicidal or suicidal plan  Cognition and Memory: Cognition normal          Judgment: Judgment normal        BMI Counseling: Body mass index is 32 5 kg/m²  The BMI is above normal  Nutrition recommendations include reducing portion sizes and decreasing overall calorie intake  Exercise recommendations include moderate aerobic physical activity for 150 minutes/week, exercising 3-5 times per week, joining a gym and strength training exercises  Depression Screening Follow-up Plan: Patient's depression screening was positive with a PHQ-2 score of 0  Their PHQ-9 score was 2  Clinically patient does not have depression  No treatment is required

## 2021-08-23 LAB — PANC ISLET CELL AB TITR SER: NEGATIVE {TITER}

## 2021-08-31 ENCOUNTER — TELEPHONE (OUTPATIENT)
Dept: OBGYN CLINIC | Facility: CLINIC | Age: 36
End: 2021-08-31

## 2021-08-31 DIAGNOSIS — N97.0 ANOVULATION: Primary | ICD-10-CM

## 2021-10-09 NOTE — PROGRESS NOTES
Spoke to patient  Informed her cardiology cleared her to start phentermine  Reviewed the potential side effects of phentermine, which may include: increased heart rate, blood pressure, palpitations, headache, dizziness, insomnia (trouble sleeping), abdominal upset, and dry mouth  Advised phentermine is contraindicated with pregnancy  She will call the office with any questions or concerns  She will f/u in the office in 1 month   PDMP queried, no red flags normal... Well appearing, awake, alert, oriented to person, place, time/situation and in no apparent distress.

## 2021-11-12 ENCOUNTER — TELEPHONE (OUTPATIENT)
Dept: PSYCHIATRY | Facility: CLINIC | Age: 36
End: 2021-11-12

## 2021-11-12 ENCOUNTER — OFFICE VISIT (OUTPATIENT)
Dept: FAMILY MEDICINE CLINIC | Facility: CLINIC | Age: 36
End: 2021-11-12
Payer: COMMERCIAL

## 2021-11-12 VITALS
HEART RATE: 88 BPM | BODY MASS INDEX: 33.04 KG/M2 | WEIGHT: 175 LBS | SYSTOLIC BLOOD PRESSURE: 112 MMHG | DIASTOLIC BLOOD PRESSURE: 70 MMHG | HEIGHT: 61 IN | OXYGEN SATURATION: 98 % | RESPIRATION RATE: 16 BRPM

## 2021-11-12 DIAGNOSIS — E11.9 TYPE 2 DIABETES MELLITUS WITHOUT COMPLICATION, UNSPECIFIED WHETHER LONG TERM INSULIN USE (HCC): Primary | ICD-10-CM

## 2021-11-12 DIAGNOSIS — R09.82 PND (POST-NASAL DRIP): ICD-10-CM

## 2021-11-12 DIAGNOSIS — Z87.891 FORMER SMOKER: ICD-10-CM

## 2021-11-12 DIAGNOSIS — E11.9 NEWLY DIAGNOSED DIABETES (HCC): ICD-10-CM

## 2021-11-12 DIAGNOSIS — E55.9 VITAMIN D DEFICIENCY: ICD-10-CM

## 2021-11-12 DIAGNOSIS — E78.5 HYPERLIPIDEMIA LDL GOAL <70: ICD-10-CM

## 2021-11-12 DIAGNOSIS — N97.0 ANOVULATION: ICD-10-CM

## 2021-11-12 DIAGNOSIS — Z23 NEED FOR INFLUENZA VACCINATION: ICD-10-CM

## 2021-11-12 PROCEDURE — 90471 IMMUNIZATION ADMIN: CPT | Performed by: FAMILY MEDICINE

## 2021-11-12 PROCEDURE — 90682 RIV4 VACC RECOMBINANT DNA IM: CPT | Performed by: FAMILY MEDICINE

## 2021-11-12 PROCEDURE — 99214 OFFICE O/P EST MOD 30 MIN: CPT | Performed by: FAMILY MEDICINE

## 2021-11-12 RX ORDER — LISINOPRIL 2.5 MG/1
2.5 TABLET ORAL DAILY
Qty: 90 TABLET | Refills: 0 | Status: SHIPPED | OUTPATIENT
Start: 2021-11-12

## 2021-11-12 RX ORDER — CHOLECALCIFEROL (VITAMIN D3) 1250 MCG
CAPSULE ORAL
COMMUNITY
Start: 2021-08-20 | End: 2021-11-12

## 2021-11-12 RX ORDER — INSULIN GLARGINE 100 [IU]/ML
10 INJECTION, SOLUTION SUBCUTANEOUS
Qty: 15 ML | Refills: 0 | Status: SHIPPED | OUTPATIENT
Start: 2021-11-12 | End: 2021-12-21

## 2021-11-12 RX ORDER — ROSUVASTATIN CALCIUM 5 MG/1
5 TABLET, COATED ORAL DAILY
Qty: 90 TABLET | Refills: 0 | Status: SHIPPED | OUTPATIENT
Start: 2021-11-12 | End: 2021-11-24

## 2021-11-19 ENCOUNTER — LAB (OUTPATIENT)
Dept: LAB | Facility: CLINIC | Age: 36
End: 2021-11-19
Payer: COMMERCIAL

## 2021-11-19 DIAGNOSIS — E11.9 NEWLY DIAGNOSED DIABETES (HCC): ICD-10-CM

## 2021-11-19 DIAGNOSIS — E55.9 VITAMIN D DEFICIENCY: ICD-10-CM

## 2021-11-19 LAB
25(OH)D3 SERPL-MCNC: 17 NG/ML (ref 30–100)
ANION GAP SERPL CALCULATED.3IONS-SCNC: 12 MMOL/L (ref 4–13)
BUN SERPL-MCNC: 10 MG/DL (ref 5–25)
CALCIUM SERPL-MCNC: 9.6 MG/DL (ref 8.3–10.1)
CHLORIDE SERPL-SCNC: 100 MMOL/L (ref 100–108)
CHOLEST SERPL-MCNC: 252 MG/DL (ref 50–200)
CO2 SERPL-SCNC: 24 MMOL/L (ref 21–32)
CREAT SERPL-MCNC: 0.77 MG/DL (ref 0.6–1.3)
CREAT UR-MCNC: 222 MG/DL
EST. AVERAGE GLUCOSE BLD GHB EST-MCNC: 148 MG/DL
GFR SERPL CREATININE-BSD FRML MDRD: 114 ML/MIN/1.73SQ M
GLUCOSE P FAST SERPL-MCNC: 119 MG/DL (ref 65–99)
HBA1C MFR BLD: 6.8 %
HDLC SERPL-MCNC: 87 MG/DL
LDLC SERPL CALC-MCNC: 146 MG/DL (ref 0–100)
MICROALBUMIN UR-MCNC: 15.6 MG/L (ref 0–20)
MICROALBUMIN/CREAT 24H UR: 7 MG/G CREATININE (ref 0–30)
NONHDLC SERPL-MCNC: 165 MG/DL
POTASSIUM SERPL-SCNC: 3.9 MMOL/L (ref 3.5–5.3)
SODIUM SERPL-SCNC: 136 MMOL/L (ref 136–145)
TRIGL SERPL-MCNC: 95 MG/DL

## 2021-11-19 PROCEDURE — 82306 VITAMIN D 25 HYDROXY: CPT

## 2021-11-19 PROCEDURE — 83036 HEMOGLOBIN GLYCOSYLATED A1C: CPT

## 2021-11-19 PROCEDURE — 82043 UR ALBUMIN QUANTITATIVE: CPT

## 2021-11-19 PROCEDURE — 36415 COLL VENOUS BLD VENIPUNCTURE: CPT

## 2021-11-19 PROCEDURE — 82570 ASSAY OF URINE CREATININE: CPT

## 2021-11-19 PROCEDURE — 80061 LIPID PANEL: CPT

## 2021-11-19 PROCEDURE — 80048 BASIC METABOLIC PNL TOTAL CA: CPT

## 2021-11-19 RX ORDER — PEN NEEDLE, DIABETIC 31 GX5/16"
NEEDLE, DISPOSABLE MISCELLANEOUS
Qty: 100 EACH | Refills: 0 | Status: SHIPPED | OUTPATIENT
Start: 2021-11-19

## 2021-11-24 DIAGNOSIS — E78.5 HYPERLIPIDEMIA LDL GOAL <70: Primary | ICD-10-CM

## 2021-11-24 DIAGNOSIS — E55.9 VITAMIN D DEFICIENCY: ICD-10-CM

## 2021-11-24 RX ORDER — ROSUVASTATIN CALCIUM 10 MG/1
10 TABLET, COATED ORAL DAILY
Qty: 90 TABLET | Refills: 0 | Status: SHIPPED | OUTPATIENT
Start: 2021-11-24 | End: 2022-08-09

## 2021-12-21 ENCOUNTER — OFFICE VISIT (OUTPATIENT)
Dept: ENDOCRINOLOGY | Facility: CLINIC | Age: 36
End: 2021-12-21
Payer: COMMERCIAL

## 2021-12-21 VITALS
OXYGEN SATURATION: 99 % | DIASTOLIC BLOOD PRESSURE: 74 MMHG | HEART RATE: 97 BPM | BODY MASS INDEX: 33.07 KG/M2 | SYSTOLIC BLOOD PRESSURE: 110 MMHG | HEIGHT: 61 IN

## 2021-12-21 DIAGNOSIS — E11.65 TYPE 2 DIABETES MELLITUS WITH HYPERGLYCEMIA, WITH LONG-TERM CURRENT USE OF INSULIN (HCC): Primary | ICD-10-CM

## 2021-12-21 DIAGNOSIS — Z79.4 TYPE 2 DIABETES MELLITUS WITH HYPERGLYCEMIA, WITH LONG-TERM CURRENT USE OF INSULIN (HCC): Primary | ICD-10-CM

## 2021-12-21 DIAGNOSIS — E55.9 VITAMIN D DEFICIENCY: ICD-10-CM

## 2021-12-21 DIAGNOSIS — E78.2 MIXED HYPERLIPIDEMIA: ICD-10-CM

## 2021-12-21 PROCEDURE — 99214 OFFICE O/P EST MOD 30 MIN: CPT | Performed by: PHYSICIAN ASSISTANT

## 2021-12-21 RX ORDER — INSULIN GLARGINE 100 [IU]/ML
6 INJECTION, SOLUTION SUBCUTANEOUS
Qty: 15 ML | Refills: 0
Start: 2021-12-21 | End: 2022-04-26

## 2021-12-21 RX ORDER — SEMAGLUTIDE 1.34 MG/ML
INJECTION, SOLUTION SUBCUTANEOUS
Qty: 3 ML | Refills: 1 | Status: SHIPPED | OUTPATIENT
Start: 2021-12-21 | End: 2022-04-26

## 2022-01-10 DIAGNOSIS — B34.9 VIRAL INFECTION, UNSPECIFIED: Primary | ICD-10-CM

## 2022-01-10 PROCEDURE — U0003 INFECTIOUS AGENT DETECTION BY NUCLEIC ACID (DNA OR RNA); SEVERE ACUTE RESPIRATORY SYNDROME CORONAVIRUS 2 (SARS-COV-2) (CORONAVIRUS DISEASE [COVID-19]), AMPLIFIED PROBE TECHNIQUE, MAKING USE OF HIGH THROUGHPUT TECHNOLOGIES AS DESCRIBED BY CMS-2020-01-R: HCPCS | Performed by: FAMILY MEDICINE

## 2022-01-10 PROCEDURE — U0005 INFEC AGEN DETEC AMPLI PROBE: HCPCS | Performed by: FAMILY MEDICINE

## 2022-03-09 ENCOUNTER — SOCIAL WORK (OUTPATIENT)
Dept: BEHAVIORAL/MENTAL HEALTH CLINIC | Facility: CLINIC | Age: 37
End: 2022-03-09
Payer: COMMERCIAL

## 2022-03-09 DIAGNOSIS — F41.1 GAD (GENERALIZED ANXIETY DISORDER): Primary | ICD-10-CM

## 2022-03-09 DIAGNOSIS — F41.8 ANXIETY WITH DEPRESSION: ICD-10-CM

## 2022-03-09 PROCEDURE — 90791 PSYCH DIAGNOSTIC EVALUATION: CPT | Performed by: COUNSELOR
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
## 2022-03-23 ENCOUNTER — SOCIAL WORK (OUTPATIENT)
Dept: BEHAVIORAL/MENTAL HEALTH CLINIC | Facility: CLINIC | Age: 37
End: 2022-03-23
Payer: COMMERCIAL

## 2022-03-23 DIAGNOSIS — F41.1 GAD (GENERALIZED ANXIETY DISORDER): Primary | ICD-10-CM

## 2022-03-23 PROCEDURE — 90834 PSYTX W PT 45 MINUTES: CPT | Performed by: COUNSELOR

## 2022-03-23 NOTE — BH TREATMENT PLAN
Christina Arms  1985       Date of Initial Treatment Plan: 3/23/2022   Date of Current Treatment Plan: 03/23/22    Treatment Plan Number 1     Strengths/Personal Resources for Self Care: Jessica Disla is resilient, prides herself on her mothering  Diagnosis:   1  STELLA (generalized anxiety disorder)         Area of Needs: Depression/Mood Management, Anxiety Management      Long Term Goal 1: Depression and Mood Management    Target Date: 9/23/2022  Completion Date: N/A         Short Term Objectives for Goal 1: Jessica Disla will work on learning about depression management and behavioral activation strategies to implement in order to manage her mood  Long Term Goal 2: Anxiety Management    Target Date: 9/23/2022  Completion Date: N/A    Short Term Objectives for Goal 2: Jessica Disla will work on learning the cognitive behavioral model of anxiety as well as strategies to help her cope with her anxiety  She will learn how to recognize her irrational thoughts and how to restructure them  She will also start to journal to identify her thought patterns that exacerbate her anxiety  Long Term Goal # 3: N/A     Target Date: N/A  Completion Date: N/A    Short Term Objectives for Goal 3: N/A    GOAL 1: Modality: The person(s) responsible for carrying out the plan is  maddie Dillard therapist     GOAL 2: Modality: The person(s) responsible for carrying out the plan is  maddie Dillard therapist      GOAL 3: Modality: The person(s) responsible for carrying out the plan is        Behavioral Health Treatment Plan [address] [place] [address] [address]: Diagnosis and Treatment Plan explained to Raquel Francis relates understanding diagnosis and is agreeable to Treatment Plan  Client Comments : Please share your thoughts, feelings, need and/or experiences regarding your treatment plan: No questions or concerns were reported

## 2022-03-23 NOTE — PSYCH
Psychotherapy Provided: Individual Psychotherapy 45 minutes     Length of time in session: 45 minutes, follow up in 2 week    No diagnosis found  Goals addressed in session:Created treatment plan    Pain:      moderate to severe    8    Current suicide risk : Low     D: Gilma presented in session with heightened anxiety and depressed mood  Focused session on creating a treatment plan as well as discussing the cognitive model of anxiety  Will continue to meet biweekly  A: Darell Albarran continues to present as highly anxious  She denies SI/Hi/SIB  She is struggling with significant thoughts of the future and wondering "what if" something happens to her  She struggles with finding methods of coping  Her treatment will be focused on managing her anxiety and mood   : Darell Albarran will continue to meet weekly with this therapist      [address]: Diagnosis and Treatment Plan explained to Maranda Watson relates understanding diagnosis and is agreeable to Treatment Plan   Yes

## 2022-04-06 ENCOUNTER — SOCIAL WORK (OUTPATIENT)
Dept: BEHAVIORAL/MENTAL HEALTH CLINIC | Facility: CLINIC | Age: 37
End: 2022-04-06
Payer: COMMERCIAL

## 2022-04-06 DIAGNOSIS — F41.1 GAD (GENERALIZED ANXIETY DISORDER): Primary | ICD-10-CM

## 2022-04-06 PROCEDURE — 90834 PSYTX W PT 45 MINUTES: CPT | Performed by: COUNSELOR

## 2022-04-06 NOTE — PSYCH
Psychotherapy Provided: Individual Psychotherapy 45 minutes     Length of time in session: 45 minutes, follow up in 2 week    No diagnosis found  Goals addressed in session: Goal 1     Pain:      moderate to severe    6    Current suicide risk : Low     D: Benang presented in session with ongoing depression mood and anxiety  She reports feeling that she wants more for herself and is interested in exploring getting into the work force  Discussed what she would need to do and set an agenda with starting to apply for jobs  Focused session as well on review of behavioral activation strategies  Will continue to meet biweekly  A: Russell Ahuja presented as oriented x3  She denies SI/HI/SIB  It is evident that she is overwhelmed with being at home and taking care of the house hold  Russell Ahuja does seem to struggle with advocating for her needs and wants which in turn impacts her mood  P: Russell Ahuja will continue to meet biweekly with this therapist      [address]: Diagnosis and Treatment Plan explained to Sabi Stern relates understanding diagnosis and is agreeable to Treatment Plan   Yes

## 2022-04-21 ENCOUNTER — LAB (OUTPATIENT)
Dept: LAB | Facility: CLINIC | Age: 37
End: 2022-04-21
Payer: COMMERCIAL

## 2022-04-21 DIAGNOSIS — E11.65 TYPE 2 DIABETES MELLITUS WITH HYPERGLYCEMIA, WITH LONG-TERM CURRENT USE OF INSULIN (HCC): ICD-10-CM

## 2022-04-21 DIAGNOSIS — Z79.4 TYPE 2 DIABETES MELLITUS WITH HYPERGLYCEMIA, WITH LONG-TERM CURRENT USE OF INSULIN (HCC): ICD-10-CM

## 2022-04-21 LAB
ANION GAP SERPL CALCULATED.3IONS-SCNC: 11 MMOL/L (ref 4–13)
BUN SERPL-MCNC: 9 MG/DL (ref 5–25)
CALCIUM SERPL-MCNC: 9.2 MG/DL (ref 8.3–10.1)
CHLORIDE SERPL-SCNC: 104 MMOL/L (ref 100–108)
CO2 SERPL-SCNC: 25 MMOL/L (ref 21–32)
CREAT SERPL-MCNC: 0.71 MG/DL (ref 0.6–1.3)
EST. AVERAGE GLUCOSE BLD GHB EST-MCNC: 111 MG/DL
GFR SERPL CREATININE-BSD FRML MDRD: 108 ML/MIN/1.73SQ M
GLUCOSE P FAST SERPL-MCNC: 100 MG/DL (ref 65–99)
HBA1C MFR BLD: 5.5 %
POTASSIUM SERPL-SCNC: 3.9 MMOL/L (ref 3.5–5.3)
SODIUM SERPL-SCNC: 140 MMOL/L (ref 136–145)

## 2022-04-21 PROCEDURE — 83036 HEMOGLOBIN GLYCOSYLATED A1C: CPT

## 2022-04-21 PROCEDURE — 36415 COLL VENOUS BLD VENIPUNCTURE: CPT

## 2022-04-21 PROCEDURE — 80048 BASIC METABOLIC PNL TOTAL CA: CPT

## 2022-04-26 ENCOUNTER — OFFICE VISIT (OUTPATIENT)
Dept: ENDOCRINOLOGY | Facility: CLINIC | Age: 37
End: 2022-04-26
Payer: COMMERCIAL

## 2022-04-26 VITALS
DIASTOLIC BLOOD PRESSURE: 68 MMHG | TEMPERATURE: 97.3 F | HEART RATE: 80 BPM | BODY MASS INDEX: 31.1 KG/M2 | WEIGHT: 164.6 LBS | SYSTOLIC BLOOD PRESSURE: 114 MMHG

## 2022-04-26 DIAGNOSIS — Z79.4 TYPE 2 DIABETES MELLITUS WITH HYPERGLYCEMIA, WITH LONG-TERM CURRENT USE OF INSULIN (HCC): Primary | ICD-10-CM

## 2022-04-26 DIAGNOSIS — E78.2 MIXED HYPERLIPIDEMIA: ICD-10-CM

## 2022-04-26 DIAGNOSIS — E11.65 TYPE 2 DIABETES MELLITUS WITH HYPERGLYCEMIA, WITH LONG-TERM CURRENT USE OF INSULIN (HCC): Primary | ICD-10-CM

## 2022-04-26 DIAGNOSIS — E55.9 VITAMIN D DEFICIENCY: ICD-10-CM

## 2022-04-26 PROCEDURE — 99214 OFFICE O/P EST MOD 30 MIN: CPT | Performed by: PHYSICIAN ASSISTANT

## 2022-04-26 RX ORDER — SEMAGLUTIDE 1.34 MG/ML
1 INJECTION, SOLUTION SUBCUTANEOUS WEEKLY
Qty: 9 ML | Refills: 1 | Status: SHIPPED | OUTPATIENT
Start: 2022-04-26 | End: 2022-07-27 | Stop reason: SDUPTHER

## 2022-04-26 NOTE — PROGRESS NOTES
Patient Progress Note      CC: DM      Referring Provider  Boaz Barnard Md  [address],  [address] [place] [address]      History of Present Illness:   Emeli Buchanan is a 44 y o  female with a history of type 2 diabetes with long term use of insulin  Diabetes course has been stable  Diagnosed in April 2021 at which time A1C was 11 7%  Denies recent severe hypoglycemic or severe hyperglycemic episodes  Denies any issues with her current regimen  Home glucose monitoring: are performed regularly    Home blood glucose readings: 90-130s mg/dl     Current regimen: Lantus 6 units QHS (not taking for 2 months) , metformin 500 mg BID with meals, Ozempic 0 5 mg weekly  compliant most of the time, denies any side effects from medications  Injects in: abdomen, thigh  Rotates sites: Yes  Hypoglycemic episodes: No, rare  H/o of hypoglycemia causing hospitalization or Intervention such as glucagon injection or ambulance call : No  Hypoglycemia symptoms: jitteriness and sweating  Treatment of hypoglycemia: orange juice     Medic alert tag: recommended: Yes     Diabetes education: Yes  Diet: 3 meals per day, 1-2 snacks per day  Timing of meals is predictable  Diabetic diet compliance: compliant some of the time  Activity: Daily activity is predictable: Yes  She exercises at the gym 2 times a week  Ophthamology: Dr Jacqualine Sicard  Podiatry: 8/2021     Has hypertension: on ACE inhibitor/ARB, compliant most of the time  Has hyperlipidemia: on statin - tolerating well, no myalgias  compliant most of the time, denies any side effects from medications  Thyroid disorders: No  History of pancreatitis: No     Vitamin D deficiency: vitamin D low at 17 0  PCP prescribed vitamin D2 02160 units weekly      Patient Active Problem List   Diagnosis    Dependence on nicotine from cigarettes    STELLA (generalized anxiety disorder)    Panic attacks    Class 1 obesity    Anxiety with depression    Elevated alkaline phosphatase level    Current every day smoker    GERD (gastroesophageal reflux disease)    Hydronephrosis with ureteropelvic junction (UPJ) obstruction    Kidney stone on left side    Ureteral calculus, left    Left ureteral stone    Anovulation    Type 2 diabetes mellitus with hyperglycemia, with long-term current use of insulin ([facility])    Mixed hyperlipidemia    Vitamin D deficiency      Past Medical History:   Diagnosis Date    Acid reflux     Allergic     seasonal    Anxiety     Blurred vision     Fainting     Headache     Irregular heart beat     Palpitations    Loss of appetite     Obesity     Spontaneous vaginal delivery     x3    Weakness       Past Surgical History:   Procedure Laterality Date    FL RETROGRADE PYELOGRAM  1/4/2020    FL RETROGRADE PYELOGRAM  1/22/2020    NO PAST SURGERIES      MN CYSTO/URETERO W/LITHOTRIPSY &INDWELL STENT INSRT Left 1/4/2020    Procedure: CYSTOSCOPY URETEROSCOPY , RETROGRADE PYELOGRAM AND INSERTION STENT URETERAL;  Surgeon: Trinidad Pitt MD;  [facility];  Service: Urology    MN CYSTO/URETERO W/LITHOTRIPSY &INDWELL STENT INSRT Left 1/22/2020    Procedure: CYSTOSCOPY URETEROSCOPY WITH LITHOTRIPSY HOLMIUM LASER, RETROGRADE PYELOGRAM AND INSERTION STENT URETERAL;  Surgeon: Deny Guzman MD;  [facility];  Service: Urology      Family History   Problem Relation Age of Onset    Hypertension Mother     Breast cancer Maternal Grandmother     Diabetes Maternal Grandmother     Thyroid disease Other     Autism Other     Anemia Father     Leukemia Sister     Diabetes Paternal Grandmother     Fibroids Sister     No Known Problems Brother     No Known Problems Sister     No Known Problems Sister     Multiple sclerosis Cousin     Colon cancer Neg Hx     Cervical cancer Neg Hx     Stroke Neg Hx      Social History     Tobacco Use    Smoking status: Former Smoker     Packs/day: 0 25     Years: 20 00     Pack years: 5 00 Types: Cigarettes     Quit date: 2021     Years since quittin 0    Smokeless tobacco: Never Used    Tobacco comment: currently 7cigs/day   Substance Use Topics    Alcohol use: Yes     Comment: monthly     Allergies   Allergen Reactions    Other Itching     apples         Current Outpatient Medications:     Blood Glucose Monitoring Suppl (OneTouch Verio Reflect) w/Device KIT, Use 1 kit daily at bedtime E11 65, Please substitute with appropriate alternative as covered by patient's insurance , Disp: 1 kit, Rfl: 0    Cholecalciferol 1 25 MG (84655 UT) TABS, Take 1 tablet (50,000 Units total) by mouth once a week x12wks, Disp: 12 tablet, Rfl: 0    glucose blood (OneTouch Verio) test strip, E11 65, Please substitute with appropriate alternative as covered by patient's insurance   Check sugars TID, Disp: 100 each, Rfl: 11    Insulin Pen Needle (Easy Touch Pen Needles) 31G X 8 MM MISC, Use daily at bedtime, Disp: 100 each, Rfl: 0    lisinopril (ZESTRIL) 2 5 mg tablet, Take 1 tablet (2 5 mg total) by mouth daily, Disp: 90 tablet, Rfl: 0    metFORMIN (GLUCOPHAGE) 1000 MG tablet, Take 0 5 tablets (500 mg total) by mouth daily with breakfast, Disp: 45 tablet, Rfl: 1    OneTouch Delica Lancets 87T MISC, Use daily at bedtime E11 65, Please substitute with appropriate alternative as covered by patient's insurance , Disp: 100 each, Rfl: 11    rosuvastatin (CRESTOR) 10 MG tablet, Take 1 tablet (10 mg total) by mouth daily, Disp: 90 tablet, Rfl: 0    clomiPHENE (CLOMID) 50 mg tablet, Take 1 tablet (50 mg total) by mouth daily for 5 days, Disp: 5 tablet, Rfl: 3    clomiPHENE (CLOMID) 50 mg tablet, Take 2 tablets (100 mg total) by mouth daily for 5 days, Disp: 10 tablet, Rfl: 2    semaglutide, 1 mg/dose, (Ozempic, 1 MG/DOSE,) 4 MG/3ML SOPN injection pen, Inject 0 75 mL (1 mg total) under the skin once a week, Disp: 9 mL, Rfl: 1  Review of Systems   Constitutional: Negative for activity change, appetite change, fatigue and unexpected weight change  HENT: Negative for trouble swallowing  Eyes: Negative for visual disturbance  Respiratory: Negative for shortness of breath  Cardiovascular: Negative for chest pain and palpitations  Gastrointestinal: Negative for constipation and diarrhea  Endocrine: Negative for polydipsia and polyuria  Musculoskeletal: Positive for arthralgias  Skin: Negative for wound  Neurological: Negative for numbness  Psychiatric/Behavioral: Negative  Physical Exam:  Body mass index is 31 1 kg/m²  /68   Pulse 80   Temp (!) 97 3 °F (36 3 °C)   Wt 74 7 kg (164 lb 9 6 oz)   BMI 31 10 kg/m²    Wt Readings from Last 3 Encounters:   04/26/22 74 7 kg (164 lb 9 6 oz)   11/12/21 79 4 kg (175 lb)   08/20/21 78 kg (172 lb)       Physical Exam  Vitals and nursing note reviewed  Constitutional:       Appearance: She is well-developed  HENT:      Head: Normocephalic  Eyes:      General: No scleral icterus  Pupils: Pupils are equal, round, and reactive to light  Neck:      Thyroid: No thyromegaly  Cardiovascular:      Rate and Rhythm: Normal rate and regular rhythm  Pulses:           Radial pulses are 2+ on the right side and 2+ on the left side  Heart sounds: No murmur heard  Pulmonary:      Effort: Pulmonary effort is normal  No respiratory distress  Breath sounds: Normal breath sounds  No wheezing  Musculoskeletal:      Cervical back: Neck supple  Skin:     General: Skin is warm and dry  Neurological:      Mental Status: She is alert  Patient's shoes and socks were not removed            Labs:   Component      Latest Ref Rng & Units 11/19/2021 4/21/2022   Sodium      136 - 145 mmol/L 136 140   Potassium      3 5 - 5 3 mmol/L 3 9 3 9   Chloride      100 - 108 mmol/L 100 104   CO2      21 - 32 mmol/L 24 25   Anion Gap      4 - 13 mmol/L 12 11   BUN      5 - 25 mg/dL 10 9   Creatinine      0 60 - 1 30 mg/dL 0 77 0 71   GLUCOSE FASTING      65 - 99 mg/dL 119 (H) 100 (H)   Calcium      8 3 - 10 1 mg/dL 9 6 9 2   eGFR      ml/min/1 73sq m 114 108   Cholesterol      50 - 200 mg/dL 252 (H)    Triglycerides      <=150 mg/dL 95    HDL      >=40 mg/dL 87    LDL Calculated      0 - 100 mg/dL 146 (H)    Non-HDL Cholesterol      mg/dl 165    EXT Creatinine Urine      mg/dL 222 0    MICROALBUM ,U,RANDOM      0 0 - 20 0 mg/L 15 6    MICROALBUMIN/CREATININE RATIO      0 - 30 mg/g creatinine 7    Hemoglobin A1C      Normal 3 8-5 6%; PreDiabetic 5 7-6 4%; Diabetic >=6 5%; Glycemic control for adults with diabetes <7 0% % 6 8 (H) 5 5   eAG, EST AVG Glucose      mg/dl 148 111   Vit D, 25-Hydroxy      30 0 - 100 0 ng/mL 17 0 (L)        Plan:    Diagnoses and all orders for this visit:    Type 2 diabetes mellitus with hyperglycemia, with long-term current use of insulin ([facility])  HGA1C 5 5%  Improved  Treatment regimen: patient would like to try higher dose of Ozempic for weight loss purposes  Increase Ozempic to 1 mg weekly and decrease metformin to 500 mg daily  If unable to tolerate 1 mg of Ozempic may decrease to 0 5 mg again  Episodes of hypoglycemia can lead to permanent disability and death  Discussed risks/complications associated with uncontrolled diabetes  Advised to adhere to diabetic diet, and recommended staying active/exercising routinely as tolerated  Keep carbohydrates consistent to limit blood glucose fluctuations  Advised to call if blood sugars less than 70 mg/dl or over 250 mg/dl  Check blood glucose 1-2 times a day  Discussed symptoms and treatment of hypoglycemia  Recommended routine follow-up with podiatry and ophthalmology  Send log in 2 weeks  Ordered blood work to complete prior to next visit  -     metFORMIN (GLUCOPHAGE) 1000 MG tablet; Take 0 5 tablets (500 mg total) by mouth daily with breakfast  -     semaglutide, 1 mg/dose, (Ozempic, 1 MG/DOSE,) 4 MG/3ML SOPN injection pen;  Inject 0 75 mL (1 mg total) under the skin once a week  -     Comprehensive metabolic panel; Future  -     Hemoglobin A1C; Future  -     Microalbumin / creatinine urine ratio; Future    Mixed hyperlipidemia  LDL previously 146  On statin therapy     Vitamin D deficiency  Managed by PCP  [place] [address] units of vitamin D once a week         Discussed with the patient diagnosis and treatment and all questions fully answered  She will call me if any problems arise  Counseled patient on diagnostic results, prognosis, risk and benefit of treatment options, instruction for management, importance of treatment compliance, risk factor reduction and impressions        Va Galloway PA-C

## 2022-04-26 NOTE — PATIENT INSTRUCTIONS
Hypoglycemia instructions   Jasmin Garcia  4/26/2022  [id number]    Low Blood Sugar    Steps to treat low blood sugar  1  Test blood sugar if you have symptoms of low blood sugar:   Low Blood Sugar Symptoms:  o Sweaty  o Dizzy  o Rapid heartbeat  o Shaky  o Bad mood  o Hungry      2  Treat blood sugar less than 70 with 15 grams of fast-acting carbohydrate:   Examples of 15 grams Fast-Acting Carbohydrate:  o 4 oz juice  o 4 oz regular soda  o 3-4 glucose tablets (chew)  o 3-4 hard candies (chew)          3  Wait 15 minutes and test your blood sugar again     4   If blood sugar is less than 100, repeat steps 2-3     5  When your blood sugar is 100 or more, eat a snack if it will be longer than one hour until your next meal  The snack should be 15 grams of carbohydrate and a protein:   Examples of snacks:  o ½ sandwich  o 6 crackers with cheese  o Piece of fruit with cheese or peanut butter  o 6 crackers with peanut butter

## 2022-07-27 DIAGNOSIS — E11.65 TYPE 2 DIABETES MELLITUS WITH HYPERGLYCEMIA, WITH LONG-TERM CURRENT USE OF INSULIN (HCC): ICD-10-CM

## 2022-07-27 DIAGNOSIS — Z79.4 TYPE 2 DIABETES MELLITUS WITH HYPERGLYCEMIA, WITH LONG-TERM CURRENT USE OF INSULIN (HCC): ICD-10-CM

## 2022-07-28 RX ORDER — SEMAGLUTIDE 1.34 MG/ML
1 INJECTION, SOLUTION SUBCUTANEOUS WEEKLY
Qty: 9 ML | Refills: 0 | Status: SHIPPED | OUTPATIENT
Start: 2022-07-28 | End: 2022-10-13 | Stop reason: SDUPTHER

## 2022-08-03 ENCOUNTER — TELEPHONE (OUTPATIENT)
Dept: ENDOCRINOLOGY | Facility: CLINIC | Age: 37
End: 2022-08-03

## 2022-08-03 ENCOUNTER — APPOINTMENT (OUTPATIENT)
Dept: LAB | Facility: CLINIC | Age: 37
End: 2022-08-03
Payer: COMMERCIAL

## 2022-08-03 DIAGNOSIS — E78.5 HYPERLIPIDEMIA LDL GOAL <70: ICD-10-CM

## 2022-08-03 DIAGNOSIS — E11.65 TYPE 2 DIABETES MELLITUS WITH HYPERGLYCEMIA, WITH LONG-TERM CURRENT USE OF INSULIN (HCC): ICD-10-CM

## 2022-08-03 DIAGNOSIS — Z79.4 TYPE 2 DIABETES MELLITUS WITH HYPERGLYCEMIA, WITH LONG-TERM CURRENT USE OF INSULIN (HCC): ICD-10-CM

## 2022-08-03 LAB
ALBUMIN SERPL BCP-MCNC: 3.9 G/DL (ref 3.5–5)
ALP SERPL-CCNC: 205 U/L (ref 34–104)
ALT SERPL W P-5'-P-CCNC: 53 U/L (ref 7–52)
ANION GAP SERPL CALCULATED.3IONS-SCNC: 8 MMOL/L (ref 4–13)
AST SERPL W P-5'-P-CCNC: 44 U/L (ref 13–39)
BILIRUB SERPL-MCNC: 0.91 MG/DL (ref 0.2–1)
BUN SERPL-MCNC: 8 MG/DL (ref 5–25)
CALCIUM SERPL-MCNC: 9.8 MG/DL (ref 8.4–10.2)
CHLORIDE SERPL-SCNC: 100 MMOL/L (ref 96–108)
CO2 SERPL-SCNC: 26 MMOL/L (ref 21–32)
CREAT SERPL-MCNC: 0.75 MG/DL (ref 0.6–1.3)
CREAT UR-MCNC: 247 MG/DL
EST. AVERAGE GLUCOSE BLD GHB EST-MCNC: 108 MG/DL
GFR SERPL CREATININE-BSD FRML MDRD: 101 ML/MIN/1.73SQ M
GLUCOSE P FAST SERPL-MCNC: 97 MG/DL (ref 65–99)
HBA1C MFR BLD: 5.4 %
MICROALBUMIN UR-MCNC: 28.9 MG/L (ref 0–20)
MICROALBUMIN/CREAT 24H UR: 12 MG/G CREATININE (ref 0–30)
POTASSIUM SERPL-SCNC: 4.1 MMOL/L (ref 3.5–5.3)
PROT SERPL-MCNC: 8.8 G/DL (ref 6.4–8.4)
SODIUM SERPL-SCNC: 134 MMOL/L (ref 135–147)

## 2022-08-03 PROCEDURE — 82043 UR ALBUMIN QUANTITATIVE: CPT

## 2022-08-03 PROCEDURE — 80053 COMPREHEN METABOLIC PANEL: CPT

## 2022-08-03 PROCEDURE — 83036 HEMOGLOBIN GLYCOSYLATED A1C: CPT

## 2022-08-03 PROCEDURE — 36415 COLL VENOUS BLD VENIPUNCTURE: CPT

## 2022-08-03 PROCEDURE — 82570 ASSAY OF URINE CREATININE: CPT

## 2022-08-03 PROCEDURE — 84443 ASSAY THYROID STIM HORMONE: CPT | Performed by: FAMILY MEDICINE

## 2022-08-03 PROCEDURE — 80061 LIPID PANEL: CPT

## 2022-08-04 ENCOUNTER — TELEPHONE (OUTPATIENT)
Dept: FAMILY MEDICINE CLINIC | Facility: CLINIC | Age: 37
End: 2022-08-04

## 2022-08-04 NOTE — TELEPHONE ENCOUNTER
A1C is well controlled at 5 4%, continue current treatment   Kidney function is normal  Fasting glucose is normal  Urine microalbumin creatinine ratio is normal    Liver functions are elevated, would suggest she follow-up with her PCP regarding this

## 2022-08-04 NOTE — TELEPHONE ENCOUNTER
Patient stated she had labs done which were ordered by her Endo doctor  Her Endo doctor went over them with her, but advised her she should have her PCP look them over and speak to her about them also  The pt asked if the doctor can review them before her appointment on the 9th

## 2022-08-05 DIAGNOSIS — E78.5 HYPERLIPIDEMIA LDL GOAL <70: Primary | ICD-10-CM

## 2022-08-05 DIAGNOSIS — Z13.29 SCREENING FOR THYROID DISORDER: ICD-10-CM

## 2022-08-05 DIAGNOSIS — Z13.0 SCREENING FOR DEFICIENCY ANEMIA: ICD-10-CM

## 2022-08-05 LAB
CHOLEST SERPL-MCNC: 217 MG/DL
HDLC SERPL-MCNC: 72 MG/DL
LDLC SERPL CALC-MCNC: 121 MG/DL (ref 0–100)
NONHDLC SERPL-MCNC: 145 MG/DL
TRIGL SERPL-MCNC: 119 MG/DL
TSH SERPL DL<=0.05 MIU/L-ACNC: 2.8 UIU/ML (ref 0.45–4.5)

## 2022-08-08 ENCOUNTER — APPOINTMENT (OUTPATIENT)
Dept: LAB | Facility: CLINIC | Age: 37
End: 2022-08-08
Payer: COMMERCIAL

## 2022-08-08 DIAGNOSIS — Z13.0 SCREENING FOR DEFICIENCY ANEMIA: ICD-10-CM

## 2022-08-08 LAB
BASOPHILS # BLD AUTO: 0.08 THOUSANDS/ΜL (ref 0–0.1)
BASOPHILS NFR BLD AUTO: 1 % (ref 0–1)
EOSINOPHIL # BLD AUTO: 0.5 THOUSAND/ΜL (ref 0–0.61)
EOSINOPHIL NFR BLD AUTO: 5 % (ref 0–6)
ERYTHROCYTE [DISTWIDTH] IN BLOOD BY AUTOMATED COUNT: 12.5 % (ref 11.6–15.1)
HCT VFR BLD AUTO: 36 % (ref 34.8–46.1)
HGB BLD-MCNC: 11.9 G/DL (ref 11.5–15.4)
IMM GRANULOCYTES # BLD AUTO: 0.08 THOUSAND/UL (ref 0–0.2)
IMM GRANULOCYTES NFR BLD AUTO: 1 % (ref 0–2)
LYMPHOCYTES # BLD AUTO: 3.39 THOUSANDS/ΜL (ref 0.6–4.47)
LYMPHOCYTES NFR BLD AUTO: 36 % (ref 14–44)
MCH RBC QN AUTO: 30.7 PG (ref 26.8–34.3)
MCHC RBC AUTO-ENTMCNC: 33.1 G/DL (ref 31.4–37.4)
MCV RBC AUTO: 93 FL (ref 82–98)
MONOCYTES # BLD AUTO: 0.66 THOUSAND/ΜL (ref 0.17–1.22)
MONOCYTES NFR BLD AUTO: 7 % (ref 4–12)
NEUTROPHILS # BLD AUTO: 4.78 THOUSANDS/ΜL (ref 1.85–7.62)
NEUTS SEG NFR BLD AUTO: 50 % (ref 43–75)
NRBC BLD AUTO-RTO: 0 /100 WBCS
PLATELET # BLD AUTO: 524 THOUSANDS/UL (ref 149–390)
PMV BLD AUTO: 9.5 FL (ref 8.9–12.7)
RBC # BLD AUTO: 3.87 MILLION/UL (ref 3.81–5.12)
WBC # BLD AUTO: 9.49 THOUSAND/UL (ref 4.31–10.16)

## 2022-08-08 PROCEDURE — 36415 COLL VENOUS BLD VENIPUNCTURE: CPT

## 2022-08-08 PROCEDURE — 85025 COMPLETE CBC W/AUTO DIFF WBC: CPT

## 2022-08-09 ENCOUNTER — OFFICE VISIT (OUTPATIENT)
Dept: FAMILY MEDICINE CLINIC | Facility: CLINIC | Age: 37
End: 2022-08-09
Payer: COMMERCIAL

## 2022-08-09 VITALS
DIASTOLIC BLOOD PRESSURE: 78 MMHG | HEART RATE: 88 BPM | WEIGHT: 160 LBS | OXYGEN SATURATION: 98 % | HEIGHT: 61 IN | SYSTOLIC BLOOD PRESSURE: 122 MMHG | BODY MASS INDEX: 30.21 KG/M2

## 2022-08-09 DIAGNOSIS — M62.830 BACK SPASM: ICD-10-CM

## 2022-08-09 DIAGNOSIS — F41.8 ANXIETY WITH DEPRESSION: ICD-10-CM

## 2022-08-09 DIAGNOSIS — Z00.00 ANNUAL PHYSICAL EXAM: Primary | ICD-10-CM

## 2022-08-09 DIAGNOSIS — R74.01 TRANSAMINITIS: ICD-10-CM

## 2022-08-09 DIAGNOSIS — Z87.891 FORMER SMOKER: ICD-10-CM

## 2022-08-09 DIAGNOSIS — E11.9 TYPE 2 DIABETES MELLITUS WITHOUT COMPLICATION, UNSPECIFIED WHETHER LONG TERM INSULIN USE (HCC): ICD-10-CM

## 2022-08-09 DIAGNOSIS — D69.1 ABNORMAL PLATELETS (HCC): ICD-10-CM

## 2022-08-09 DIAGNOSIS — E78.5 HYPERLIPIDEMIA LDL GOAL <70: ICD-10-CM

## 2022-08-09 DIAGNOSIS — Z23 NEED FOR PNEUMOCOCCAL VACCINATION: ICD-10-CM

## 2022-08-09 PROCEDURE — 99395 PREV VISIT EST AGE 18-39: CPT | Performed by: FAMILY MEDICINE

## 2022-08-09 PROCEDURE — 90471 IMMUNIZATION ADMIN: CPT | Performed by: FAMILY MEDICINE

## 2022-08-09 PROCEDURE — 90677 PCV20 VACCINE IM: CPT | Performed by: FAMILY MEDICINE

## 2022-08-09 PROCEDURE — 99214 OFFICE O/P EST MOD 30 MIN: CPT | Performed by: FAMILY MEDICINE

## 2022-08-09 RX ORDER — CYCLOBENZAPRINE HCL 5 MG
5 TABLET ORAL
Qty: 10 TABLET | Refills: 0 | Status: SHIPPED | OUTPATIENT
Start: 2022-08-09

## 2022-08-09 RX ORDER — ROSUVASTATIN CALCIUM 10 MG/1
5 TABLET, COATED ORAL
Qty: 30 TABLET | Refills: 0 | Status: SHIPPED | OUTPATIENT
Start: 2022-08-09

## 2022-08-09 NOTE — PATIENT INSTRUCTIONS

## 2022-08-09 NOTE — PROGRESS NOTES
Assessment/Plan:   Diagnoses and all orders for this visit:      Type 2 diabetes mellitus without complication, unspecified whether long term insulin use (HCC)  -     Microalbumin / creatinine urine ratio; Future  - nml DM foot exam in the office today (2022)   - due for PCV20 and received in the office today   - follows with Endo - A1c 5 4   - trace microalbuminuria - will repeat in 1month   - cont ACE 2 5mg QD for renoprotection   - RTO in 1month for close f/u - pt aware and agreeable   Former smoker  - quit smoking this past month   Need for pneumococcal vaccination  -     Pneumococcal Conjugate Vaccine 20-valent (Pcv20)    Hyperlipidemia LDL goal <70  -     Lipid panel; Future  -     rosuvastatin (CRESTOR) 10 MG tablet; Take 0 5 tablets (5 mg total) by mouth every other day  - Lipids (8/3/2022): , , HDL 72,    - goal LDL less than 70   - on Crestor 10mg QHS - (+) myalgias  - advised to cut back to Crestor 5mg Q48 and see how she does   - t/c adding Zetia 10mg to medication regimen   - RTO in 1month for close f/u     Transaminitis  -     Comprehensive metabolic panel; Future  - could be 2/2 statin   - will repeat labs in 1month after tapering down statin dose     Abnormal platelets (HCC)  -     CBC and differential; Future    Back spasm  -     cyclobenzaprine (FLEXERIL) 5 mg tablet; Take 1 tablet (5 mg total) by mouth daily at bedtime    Anxiety with depression  - pt states her therapist recommended her PCP eRx her smth PRN to "calm her down at night"    - used to be on Wellbutrin in the past but not on anything currently   - will touch base with Therapist and advised close f/u for now - pt aware and agreeable           Subjective:    Patient ID: Roger Agudelo is a 44 y o  female    Roger Agudelo is a 44 y o  female who presents to the office for an annual exam and f/u of multiple medical conditions   1) Annual   - Specialists: Endo, Therapist   - PMHx:  x3, seasonal allergies, STELLA, former smoker, DM2, HL, Vit D deficiency, h/o kidney stone, BMI 30  - allergies: NKDA  - Meds: see med rec   - PSHx: none   - FHx: M (HTN), F (anemia), S ( - leukemia), S (uterine fibroids), cousin (MS), MA (thyroid disease), MGM/PGM (DM), MGM (breast ca with brain mets - dx in her 46s), children with autism, denies FHx of colon ca/cervical ca/sudden cardiac death    - Immunizations: Tdap in 2016, UTD with COVID IMMs but no Booster, due for PCV20 and will get in the office today   - GYN Hx: last annual and PAP was 2021 (nml, denies h/o abnml PAPs), FDLMP: 7/15/2022, gets monthly; no birth control/no condoms   - diet/exercise: trying to walk, regular diet   - social: former smoker - quit last month (0 25PPD/21yrs), (+) EtOH (wine), denies illicit; kids in  grade   - sexual Hx: active with spouse, declined STD screening   - last vision: no glasses or contacts; last OV with Dr Valdo Webb was within the past year   - last dental: in the past month    - ROS: today in the office pt denies F/C/N/V/HA/visual changes/CP/palpitations/SOB/wheezing/abd pain/D/LE edema  2) DM, HL, myalgia, STELLA   - reviewed labs done 8/3/2022  - (+) myalgia, intermittent shooting pain down legs  - used to follow with Ortho who recommended CSI - but pt declined   - has been on Crestor 10mg QHS for some time now -  (<-- 146)   - (+) transaminitis noted on CMP   - pt states her therapist recommended her PCP eRx her smth PRN to "calm her down at night"    - used to be on Wellbutrin in the past      The following portions of the patient's history were reviewed and updated as appropriate: allergies, current medications, past family history, past medical history, past social history, past surgical history and problem list     Review of Systems  as per HPI    Objective:  /78   Pulse 88   Ht 5' 1" (1 549 m)   Wt 72 6 kg (160 lb)   SpO2 98%   BMI 30 23 kg/m²    Physical Exam  Vitals reviewed     Constitutional:       General: She is not in acute distress  Appearance: Normal appearance  She is not ill-appearing, toxic-appearing or diaphoretic  HENT:      Head: Normocephalic and atraumatic  Right Ear: External ear normal       Left Ear: External ear normal    Eyes:      General: No scleral icterus  Right eye: No discharge  Left eye: No discharge  Extraocular Movements: Extraocular movements intact  Conjunctiva/sclera: Conjunctivae normal    Cardiovascular:      Rate and Rhythm: Normal rate and regular rhythm  Pulses: no weak pulses          Dorsalis pedis pulses are 2+ on the right side and 2+ on the left side  Heart sounds: Normal heart sounds  No murmur heard  No friction rub  No gallop  Pulmonary:      Effort: Pulmonary effort is normal  No respiratory distress  Breath sounds: Normal breath sounds  Abdominal:      Palpations: Abdomen is soft  Musculoskeletal:         General: Normal range of motion  Cervical back: Normal range of motion  Right lower leg: No edema  Left lower leg: No edema  Feet:      Right foot:      Skin integrity: No ulcer, skin breakdown, erythema, warmth, callus or dry skin  Left foot:      Skin integrity: No ulcer, skin breakdown, erythema, warmth, callus or dry skin  Skin:     General: Skin is warm  Neurological:      General: No focal deficit present  Mental Status: She is alert and oriented to person, place, and time  Psychiatric:         Mood and Affect: Mood normal          Behavior: Behavior normal        Patient's shoes and socks removed  Right Foot/Ankle   Right Foot Inspection  Skin Exam: skin normal and skin intact  No dry skin, no warmth, no callus, no erythema, no maceration, no abnormal color, no pre-ulcer, no ulcer and no callus  Toe Exam: ROM and strength within normal limits   No swelling, no tenderness, erythema and  no right toe deformity    Sensory   Monofilament testing: intact    Vascular  The right DP pulse is 2+  Left Foot/Ankle  Left Foot Inspection  Skin Exam: skin normal and skin intact  No dry skin, no warmth, no erythema, no maceration, normal color, no pre-ulcer, no ulcer and no callus  Toe Exam: ROM and strength within normal limits  No swelling, no tenderness, no erythema and no left toe deformity  Sensory   Monofilament testing: intact    Vascular  The left DP pulse is 2+  Assign Risk Category  No deformity present  No loss of protective sensation  No weak pulses  Risk: 0      BMI Counseling: Body mass index is 30 23 kg/m²  The BMI is above normal  Nutrition recommendations include 3-5 servings of fruits/vegetables daily  Exercise recommendations include exercising 3-5 times per week

## 2022-08-09 NOTE — PROGRESS NOTES
Assessment/Plan:   Diagnoses and all orders for this visit:    Annual physical exam  - reviewed PMHx, PSHx, meds, allergies, FHx, Soc Hx and Sexual Hx   - UTD with Tdap (2016)   - UTD with COVID IMMs but no Booster  - due for PCV20 and received in the office today   - reviewed labs done 8/3/2022 - see below  - discussed diet and exercise - see below   - overdue for annual GYN exam - advised to schedule - pt aware   - UTD with PAP (2/2021)  - declined STD screening in the office today  - due for Breast Ca screening at age 43yo   - due for Colon Ca screening at age 36yo   - UTD with Optho and Dental   - RTO in 1yr for annual exam     Type 2 diabetes mellitus without complication, unspecified whether long term insulin use (HCC)  -     Microalbumin / creatinine urine ratio; Future  - nml DM foot exam in the office today (8/9/2022)   - due for PCV20 and received in the office today   - follows with Endo - A1c 5 4   - trace microalbuminuria - will repeat in 1month   - cont ACE 2 5mg QD for renoprotection   - RTO in 1month for close f/u - pt aware and agreeable   Former smoker  - quit smoking this past month   Need for pneumococcal vaccination  -     Pneumococcal Conjugate Vaccine 20-valent (Pcv20)    Hyperlipidemia LDL goal <70  -     Lipid panel; Future  -     rosuvastatin (CRESTOR) 10 MG tablet; Take 0 5 tablets (5 mg total) by mouth every other day  - Lipids (8/3/2022): , , HDL 72,    - goal LDL less than 70   - on Crestor 10mg QHS - (+) myalgias  - advised to cut back to Crestor 5mg Q48 and see how she does   - t/c adding Zetia 10mg to medication regimen   - RTO in 1month for close f/u     Transaminitis  -     Comprehensive metabolic panel; Future  - could be 2/2 statin   - will repeat labs in 1month after tapering down statin dose     Abnormal platelets (HCC)  -     CBC and differential; Future    Back spasm  -     cyclobenzaprine (FLEXERIL) 5 mg tablet;  Take 1 tablet (5 mg total) by mouth daily at bedtime    Anxiety with depression  - pt states her therapist recommended her PCP eRx her smth PRN to "calm her down at night"    - used to be on Wellbutrin in the past but not on anything currently   - will touch base with Therapist and advised close f/u for now - pt aware and agreeable           Subjective:    Patient ID: Rosalia Starks is a 44 y o  female  Rosalia Starks is a 44 y o  female who presents to the office for an annual exam and f/u of multiple medical conditions   1) Annual   - Specialists: Endo, Therapist   - PMHx:  x3, seasonal allergies, STELLA, former smoker, DM2, HL, Vit D deficiency, h/o kidney stone, BMI 30  - allergies: NKDA  - Meds: see med rec   - PSHx: none   - FHx: M (HTN), F (anemia), S ( - leukemia), S (uterine fibroids), cousin (MS), MA (thyroid disease), MGM/PGM (DM), MGM (breast ca with brain mets - dx in her 46s), children with autism, denies FHx of colon ca/cervical ca/sudden cardiac death    - Immunizations:  Tdap in 2016, UTD with COVID IMMs but no Booster, due for PCV20 and will get in the office today   - GYN Hx: last annual and PAP was 2021 (nml, denies h/o abnml PAPs), FDLMP: 7/15/2022, gets monthly; no birth control/no condoms   - diet/exercise: trying to walk, regular diet   - social: former smoker - quit last month (0 25PPD/21yrs), (+) EtOH (wine), denies illicit; kids in /[employer] grade   - sexual Hx: active with spouse, declined STD screening   - last vision: no glasses or contacts; last OV with Dr Emily Riley was within the past year   - last dental: in the past month    - ROS: today in the office pt denies F/C/N/V/HA/visual changes/CP/palpitations/SOB/wheezing/abd pain/D/LE edema  2) DM, HL, myalgia, STELLA   - reviewed labs done 8/3/2022  - (+) myalgia, intermittent shooting pain down legs  - used to follow with Ortho who recommended CSI - but pt declined   - has been on Crestor 10mg QHS for some time now -  (<-- 146)   - (+) transaminitis noted on CMP   - pt states her therapist recommended her PCP eRx her smth PRN to "calm her down at night"    - used to be on Wellbutrin in the past      The following portions of the patient's history were reviewed and updated as appropriate: allergies, current medications, past family history, past medical history, past social history, past surgical history and problem list     Review of Systems  as per HPI    Objective:  /78   Pulse 88   Ht 5' 1" (1 549 m)   Wt 72 6 kg (160 lb)   SpO2 98%   BMI 30 23 kg/m²    Physical Exam  Vitals reviewed  Constitutional:       General: She is not in acute distress  Appearance: Normal appearance  She is not ill-appearing, toxic-appearing or diaphoretic  HENT:      Head: Normocephalic and atraumatic  Right Ear: External ear normal       Left Ear: External ear normal    Eyes:      General: No scleral icterus  Right eye: No discharge  Left eye: No discharge  Extraocular Movements: Extraocular movements intact  Conjunctiva/sclera: Conjunctivae normal    Cardiovascular:      Rate and Rhythm: Normal rate and regular rhythm  Pulses: no weak pulses          Dorsalis pedis pulses are 2+ on the right side and 2+ on the left side  Heart sounds: Normal heart sounds  No murmur heard  No friction rub  No gallop  Pulmonary:      Effort: Pulmonary effort is normal  No respiratory distress  Breath sounds: Normal breath sounds  Abdominal:      Palpations: Abdomen is soft  Musculoskeletal:         General: Normal range of motion  Cervical back: Normal range of motion  Right lower leg: No edema  Left lower leg: No edema  Feet:      Right foot:      Skin integrity: No ulcer, skin breakdown, erythema, warmth, callus or dry skin  Left foot:      Skin integrity: No ulcer, skin breakdown, erythema, warmth, callus or dry skin  Skin:     General: Skin is warm  Neurological:      General: No focal deficit present        Mental Status: She is alert and oriented to person, place, and time  Psychiatric:         Mood and Affect: Mood normal          Behavior: Behavior normal        Patient's shoes and socks removed  Right Foot/Ankle   Right Foot Inspection  Skin Exam: skin normal and skin intact  No dry skin, no warmth, no callus, no erythema, no maceration, no abnormal color, no pre-ulcer, no ulcer and no callus  Toe Exam: ROM and strength within normal limits  No swelling, no tenderness, erythema and  no right toe deformity    Sensory   Monofilament testing: intact    Vascular  The right DP pulse is 2+  Left Foot/Ankle  Left Foot Inspection  Skin Exam: skin normal and skin intact  No dry skin, no warmth, no erythema, no maceration, normal color, no pre-ulcer, no ulcer and no callus  Toe Exam: ROM and strength within normal limits  No swelling, no tenderness, no erythema and no left toe deformity  Sensory   Monofilament testing: intact    Vascular  The left DP pulse is 2+  Assign Risk Category  No deformity present  No loss of protective sensation  No weak pulses  Risk: 0      BMI Counseling: Body mass index is 30 23 kg/m²  The BMI is above normal  Nutrition recommendations include 3-5 servings of fruits/vegetables daily  Exercise recommendations include exercising 3-5 times per week

## 2022-09-14 ENCOUNTER — APPOINTMENT (OUTPATIENT)
Dept: LAB | Facility: CLINIC | Age: 37
End: 2022-09-14
Payer: COMMERCIAL

## 2022-09-14 DIAGNOSIS — E11.9 TYPE 2 DIABETES MELLITUS WITHOUT COMPLICATION, UNSPECIFIED WHETHER LONG TERM INSULIN USE (HCC): ICD-10-CM

## 2022-09-14 DIAGNOSIS — R74.01 TRANSAMINITIS: ICD-10-CM

## 2022-09-14 DIAGNOSIS — D69.1 ABNORMAL PLATELETS (HCC): ICD-10-CM

## 2022-09-14 LAB
ALBUMIN SERPL BCP-MCNC: 3.9 G/DL (ref 3.5–5)
ALP SERPL-CCNC: 178 U/L (ref 34–104)
ALT SERPL W P-5'-P-CCNC: 29 U/L (ref 7–52)
ANION GAP SERPL CALCULATED.3IONS-SCNC: 5 MMOL/L (ref 4–13)
AST SERPL W P-5'-P-CCNC: 24 U/L (ref 13–39)
BASOPHILS # BLD AUTO: 0.06 THOUSANDS/ΜL (ref 0–0.1)
BASOPHILS NFR BLD AUTO: 1 % (ref 0–1)
BILIRUB SERPL-MCNC: 0.59 MG/DL (ref 0.2–1)
BUN SERPL-MCNC: 8 MG/DL (ref 5–25)
CALCIUM SERPL-MCNC: 9.3 MG/DL (ref 8.4–10.2)
CHLORIDE SERPL-SCNC: 105 MMOL/L (ref 96–108)
CO2 SERPL-SCNC: 27 MMOL/L (ref 21–32)
CREAT SERPL-MCNC: 0.62 MG/DL (ref 0.6–1.3)
CREAT UR-MCNC: 164 MG/DL
EOSINOPHIL # BLD AUTO: 0.29 THOUSAND/ΜL (ref 0–0.61)
EOSINOPHIL NFR BLD AUTO: 3 % (ref 0–6)
ERYTHROCYTE [DISTWIDTH] IN BLOOD BY AUTOMATED COUNT: 13.4 % (ref 11.6–15.1)
GFR SERPL CREATININE-BSD FRML MDRD: 114 ML/MIN/1.73SQ M
GLUCOSE P FAST SERPL-MCNC: 101 MG/DL (ref 65–99)
HCT VFR BLD AUTO: 37 % (ref 34.8–46.1)
HGB BLD-MCNC: 12.3 G/DL (ref 11.5–15.4)
IMM GRANULOCYTES # BLD AUTO: 0.04 THOUSAND/UL (ref 0–0.2)
IMM GRANULOCYTES NFR BLD AUTO: 0 % (ref 0–2)
LYMPHOCYTES # BLD AUTO: 3.3 THOUSANDS/ΜL (ref 0.6–4.47)
LYMPHOCYTES NFR BLD AUTO: 36 % (ref 14–44)
MCH RBC QN AUTO: 31.1 PG (ref 26.8–34.3)
MCHC RBC AUTO-ENTMCNC: 33.2 G/DL (ref 31.4–37.4)
MCV RBC AUTO: 94 FL (ref 82–98)
MICROALBUMIN UR-MCNC: 6.7 MG/L (ref 0–20)
MICROALBUMIN/CREAT 24H UR: 4 MG/G CREATININE (ref 0–30)
MONOCYTES # BLD AUTO: 0.59 THOUSAND/ΜL (ref 0.17–1.22)
MONOCYTES NFR BLD AUTO: 7 % (ref 4–12)
NEUTROPHILS # BLD AUTO: 4.82 THOUSANDS/ΜL (ref 1.85–7.62)
NEUTS SEG NFR BLD AUTO: 53 % (ref 43–75)
NRBC BLD AUTO-RTO: 0 /100 WBCS
PLATELET # BLD AUTO: 383 THOUSANDS/UL (ref 149–390)
PMV BLD AUTO: 9.6 FL (ref 8.9–12.7)
POTASSIUM SERPL-SCNC: 3.9 MMOL/L (ref 3.5–5.3)
PROT SERPL-MCNC: 7.9 G/DL (ref 6.4–8.4)
RBC # BLD AUTO: 3.95 MILLION/UL (ref 3.81–5.12)
SODIUM SERPL-SCNC: 137 MMOL/L (ref 135–147)
WBC # BLD AUTO: 9.1 THOUSAND/UL (ref 4.31–10.16)

## 2022-09-14 PROCEDURE — 85025 COMPLETE CBC W/AUTO DIFF WBC: CPT

## 2022-09-14 PROCEDURE — 82570 ASSAY OF URINE CREATININE: CPT

## 2022-09-14 PROCEDURE — 36415 COLL VENOUS BLD VENIPUNCTURE: CPT

## 2022-09-14 PROCEDURE — 82043 UR ALBUMIN QUANTITATIVE: CPT

## 2022-09-14 PROCEDURE — 80053 COMPREHEN METABOLIC PANEL: CPT

## 2022-09-16 ENCOUNTER — DOCUMENTATION (OUTPATIENT)
Dept: BEHAVIORAL/MENTAL HEALTH CLINIC | Facility: CLINIC | Age: 37
End: 2022-09-16

## 2022-09-16 ENCOUNTER — OFFICE VISIT (OUTPATIENT)
Dept: FAMILY MEDICINE CLINIC | Facility: CLINIC | Age: 37
End: 2022-09-16
Payer: COMMERCIAL

## 2022-09-16 VITALS
SYSTOLIC BLOOD PRESSURE: 118 MMHG | HEART RATE: 89 BPM | OXYGEN SATURATION: 97 % | WEIGHT: 162 LBS | DIASTOLIC BLOOD PRESSURE: 78 MMHG | BODY MASS INDEX: 30.58 KG/M2 | HEIGHT: 61 IN

## 2022-09-16 DIAGNOSIS — F32.1 MODERATE MAJOR DEPRESSION (HCC): Primary | ICD-10-CM

## 2022-09-16 DIAGNOSIS — F17.200 CURRENT SMOKER: ICD-10-CM

## 2022-09-16 DIAGNOSIS — F41.9 MODERATE ANXIETY: ICD-10-CM

## 2022-09-16 DIAGNOSIS — E11.9 TYPE 2 DIABETES MELLITUS WITHOUT COMPLICATION, UNSPECIFIED WHETHER LONG TERM INSULIN USE (HCC): ICD-10-CM

## 2022-09-16 DIAGNOSIS — K64.4 EXTERNAL HEMORRHOID: ICD-10-CM

## 2022-09-16 PROCEDURE — 99214 OFFICE O/P EST MOD 30 MIN: CPT | Performed by: FAMILY MEDICINE

## 2022-09-16 RX ORDER — BUPROPION HYDROCHLORIDE 150 MG/1
TABLET, EXTENDED RELEASE ORAL
Qty: 60 TABLET | Refills: 1 | Status: SHIPPED | OUTPATIENT
Start: 2022-09-16

## 2022-09-16 RX ORDER — LISINOPRIL 2.5 MG/1
2.5 TABLET ORAL DAILY
Qty: 90 TABLET | Refills: 0 | Status: SHIPPED | OUTPATIENT
Start: 2022-09-16

## 2022-09-16 NOTE — PROGRESS NOTES
Assessment/Plan:   Diagnoses and all orders for this visit:    Moderate major depression (HCC)  -     buPROPion (Wellbutrin SR) 150 mg 12 hr tablet; Take 1tab PO QD x3days and then increase to BID, separate doses by atleast 8hrs and last dose no later than 6pm, stop smoking after 1wk of treatment  Moderate anxiety  -     buPROPion (Wellbutrin SR) 150 mg 12 hr tablet; Take 1tab PO QD x3days and then increase to BID, separate doses by atleast 8hrs and last dose no later than 6pm, stop smoking after 1wk of treatment  Current smoker  -     buPROPion (Wellbutrin SR) 150 mg 12 hr tablet; Take 1tab PO QD x3days and then increase to BID, separate doses by atleast 8hrs and last dose no later than 6pm, stop smoking after 1wk of treatment  - STELLA-7 score 13  - PHQ-9 score of 12  - denies SI/HI  - last visit with Therapist Jameel Vera) was 4/6/2022 - no additional appts - advised to schedule   - has restarted smoking - 4cigs/day   - will start on Wellbutrin SR 150mg   - RTO in 4wks for f/u - pt aware and agreeable     Type 2 diabetes mellitus without complication, unspecified whether long term insulin use ([facility] [address] )  -     Ambulatory Referral to Ophthalmology; Future  -     lisinopril (ZESTRIL) 2 5 mg tablet; Take 1 tablet (2 5 mg total) by mouth daily  - nml urine microalbumin   - nml renal function   - lipid panel pending - goal LDL less than 70     External hemorrhoid  -     Ambulatory Referral to Colorectal Surgery; Future          Subjective:    Patient ID: Angelica Prado is a 44 y o  female    HPI  - reviewed labs done 9/14/2022   - has restarted smoking - 4cigs/day   - stressed re: kids   - STELLA-7 score 13  - PHQ-9 score of 12  - denies SI/HI  - last visit with Therapist Jameel Vera) was 4/6/2022   - follows with Endo and has appt scheduled for 11/14/2022   - frustrated that she's hit a plateau re: weight loss - is on Ozempic 1mg/dose       The following portions of the patient's history were reviewed and updated as appropriate: allergies, current medications, past family history, past medical history, past social history, past surgical history and problem list     Review of Systems  as per HPI    Objective:  /78   Pulse 89   Ht 5' 1" (1 549 m)   Wt 73 5 kg (162 lb)   SpO2 97%   BMI 30 61 kg/m²    Physical Exam  Vitals reviewed  Constitutional:       General: She is not in acute distress  Appearance: Normal appearance  She is not ill-appearing, toxic-appearing or diaphoretic  HENT:      Head: Normocephalic and atraumatic  Right Ear: External ear normal       Left Ear: External ear normal       Nose: Nose normal    Eyes:      General: No scleral icterus  Right eye: No discharge  Left eye: No discharge  Extraocular Movements: Extraocular movements intact  Conjunctiva/sclera: Conjunctivae normal    Pulmonary:      Effort: Pulmonary effort is normal    Musculoskeletal:         General: Normal range of motion  Cervical back: Normal range of motion  Neurological:      General: No focal deficit present  Mental Status: She is alert and oriented to person, place, and time  Psychiatric:         Attention and Perception: Attention normal          Mood and Affect: Affect normal  Mood is anxious and depressed  Speech: Speech normal  She is communicative  Behavior: Behavior normal  Behavior is cooperative  Thought Content: Thought content normal  Thought content does not include homicidal or suicidal ideation  Thought content does not include homicidal or suicidal plan  Cognition and Memory: Cognition normal          Judgment: Judgment normal       Comments: STELLA-7 score 13, PHQ-9 score of 12, denies SI/HI           BMI Counseling: Body mass index is 30 61 kg/m²  The BMI is above normal  Nutrition recommendations include 3-5 servings of fruits/vegetables daily  Exercise recommendations include exercising 3-5 times per week       Depression Screening Follow-up Plan: Patient's depression screening was positive with a PHQ-2 score of   Their PHQ-9 score was 12  Patient assessed for underlying major depression  They have no active suicidal ideations  Brief counseling provided and recommend additional follow-up/re-evaluation next office visit  Continue regular follow-up with their psychologist/therapist/psychiatrist who is managing their mental health condition(s)  Patient advised to follow-up with PCP for further management

## 2022-09-16 NOTE — PROGRESS NOTES
Assessment/Plan:      There are no diagnoses linked to this encounter  Subjective:     Patient ID: Roger Agudelo is a 44 y o  female  Outpatient Discharge Summary:   Admission Date: March 9, 2022   Marcel Black was referred by her primary care office   Discharge Date: September 16, 2022    Discharge Diagnosis:    No diagnosis found  Treating Physician: CHE  Treatment Complications: Marcel Black stopped attending sessions regularly  Presenting Problem: Generalized Anxiety   Course of treatment includes:    psychoeducation and individual therapy   Treatment Progress: fair  Criteria for Discharge: and did not respond to attempts to reschedule  Aftercare recommendations include please contact the office if she would like to be added to the waitlist again for services  Discharge Medications include:  Current Outpatient Medications:     Blood Glucose Monitoring Suppl (OneTouch Verio Reflect) w/Device KIT, Use 1 kit daily at bedtime E11 65, Please substitute with appropriate alternative as covered by patient's insurance , Disp: 1 kit, Rfl: 0    buPROPion (Wellbutrin SR) 150 mg 12 hr tablet, Take 1tab PO QD x3days and then increase to BID, separate doses by atleast 8hrs and last dose no later than 6pm, stop smoking after 1wk of treatment, Disp: 60 tablet, Rfl: 1    Cholecalciferol 1 25 MG (23112 UT) TABS, Take 1 tablet (50,000 Units total) by mouth once a week x12wks, Disp: 12 tablet, Rfl: 0    cyclobenzaprine (FLEXERIL) 5 mg tablet, Take 1 tablet (5 mg total) by mouth daily at bedtime, Disp: 10 tablet, Rfl: 0    glucose blood (OneTouch Verio) test strip, E11 65, Please substitute with appropriate alternative as covered by patient's insurance   Check sugars TID, Disp: 100 each, Rfl: 11    Insulin Pen Needle (Easy Touch Pen Needles) 31G X 8 MM MISC, Use daily at bedtime, Disp: 100 each, Rfl: 0    lisinopril (ZESTRIL) 2 5 mg tablet, Take 1 tablet (2 5 mg total) by mouth daily, Disp: 90 tablet, Rfl: 0   metFORMIN (GLUCOPHAGE) 1000 MG tablet, Take 0 5 tablets (500 mg total) by mouth daily with breakfast, Disp: 45 tablet, Rfl: 1    OneTouch Delica Lancets 36O MISC, Use daily at bedtime E11 65, Please substitute with appropriate alternative as covered by patient's insurance , Disp: 100 each, Rfl: 11    rosuvastatin (CRESTOR) 10 MG tablet, Take 0 5 tablets (5 mg total) by mouth every other day, Disp: 30 tablet, Rfl: 0    semaglutide, 1 mg/dose, (Ozempic, 1 MG/DOSE,) 4 MG/3ML SOPN injection pen, Inject 0 75 mL (1 mg total) under the skin once a week, Disp: 9 mL, Rfl: 0    Prognosis: fair

## 2022-09-21 NOTE — PROGRESS NOTES
Spoke with ENDO re: pt's A1c (5 4)   per Endo, advised to STOP Metformin and to cont Ozempic for weight loss    Pt informed  Pt has f/u appt scheduled with Endo for 11/14/2022

## 2022-10-13 DIAGNOSIS — E11.65 TYPE 2 DIABETES MELLITUS WITH HYPERGLYCEMIA, WITH LONG-TERM CURRENT USE OF INSULIN (HCC): ICD-10-CM

## 2022-10-13 DIAGNOSIS — Z79.4 TYPE 2 DIABETES MELLITUS WITH HYPERGLYCEMIA, WITH LONG-TERM CURRENT USE OF INSULIN (HCC): ICD-10-CM

## 2022-10-14 RX ORDER — SEMAGLUTIDE 1.34 MG/ML
1 INJECTION, SOLUTION SUBCUTANEOUS WEEKLY
Qty: 9 ML | Refills: 0 | Status: SHIPPED | OUTPATIENT
Start: 2022-10-14

## 2022-11-11 ENCOUNTER — APPOINTMENT (OUTPATIENT)
Dept: LAB | Facility: CLINIC | Age: 37
End: 2022-11-11

## 2022-11-11 DIAGNOSIS — E78.5 HYPERLIPIDEMIA LDL GOAL <70: ICD-10-CM

## 2022-11-11 LAB
CHOLEST SERPL-MCNC: 246 MG/DL
HDLC SERPL-MCNC: 86 MG/DL
LDLC SERPL CALC-MCNC: 130 MG/DL (ref 0–100)
NONHDLC SERPL-MCNC: 160 MG/DL
TRIGL SERPL-MCNC: 150 MG/DL

## 2022-11-14 ENCOUNTER — OFFICE VISIT (OUTPATIENT)
Dept: ENDOCRINOLOGY | Facility: CLINIC | Age: 37
End: 2022-11-14

## 2022-11-14 VITALS
DIASTOLIC BLOOD PRESSURE: 78 MMHG | HEIGHT: 61 IN | WEIGHT: 159 LBS | TEMPERATURE: 98.1 F | SYSTOLIC BLOOD PRESSURE: 126 MMHG | BODY MASS INDEX: 30.02 KG/M2 | HEART RATE: 96 BPM

## 2022-11-14 DIAGNOSIS — E11.65 TYPE 2 DIABETES MELLITUS WITH HYPERGLYCEMIA, WITHOUT LONG-TERM CURRENT USE OF INSULIN (HCC): Primary | ICD-10-CM

## 2022-11-14 DIAGNOSIS — E78.2 MIXED HYPERLIPIDEMIA: ICD-10-CM

## 2022-11-14 DIAGNOSIS — E55.9 VITAMIN D DEFICIENCY: ICD-10-CM

## 2022-11-14 LAB — SL AMB POCT HEMOGLOBIN AIC: 5.2 (ref ?–6.5)

## 2022-11-14 RX ORDER — MULTIVIT-MIN/IRON/FOLIC ACID/K 18-600-40
2000 CAPSULE ORAL DAILY
Start: 2022-11-14

## 2022-11-14 RX ORDER — METFORMIN HYDROCHLORIDE 500 MG/1
500 TABLET, EXTENDED RELEASE ORAL
Start: 2022-11-14

## 2022-11-14 NOTE — PROGRESS NOTES
Patient Progress Note      CC: DM      Referring Provider  No referring provider defined for this encounter  History of Present Illness:   Yvette Paez is a 44 y o  female with a history of type 2 diabetes with long term use of insulin  Diabetes course has been stable  Diagnosed in April 2021 at which time A1C was 11 7%  Denies recent severe hypoglycemic or severe hyperglycemic episodes  Denies any issues with her current regimen  Home glucose monitoring: are performed sporadically     Home blood glucose readings:  mg/dl      Current regimen:  Ozempic 1 mg weekly, metformin 500 mg BID  compliant most of the time, denies any side effects from medications  Injects in: abdomen  Rotates sites: Yes  Hypoglycemic episodes: No, rare  H/o of hypoglycemia causing hospitalization or Intervention such as glucagon injection or ambulance call : No  Hypoglycemia symptoms: jitteriness and sweating  Treatment of hypoglycemia: orange juice     Medic alert tag: recommended: Yes     Diabetes education: Yes  Diet: 3 meals per day, 1-2 snacks per day  Timing of meals is predictable  Diabetic diet compliance: compliant some of the time  Activity: Daily activity is predictable: Yes  She does go for walks regularly  Ophthamology: Dr Dora Sagastume  Podiatry: 8/2022     Has hypertension: on ACE inhibitor/ARB, compliant most of the time  Has hyperlipidemia: on statin - tolerating well, no myalgias  compliant most of the time, denies any side effects from medications  Thyroid disorders: No  History of pancreatitis: No     Vitamin D deficiency: vitamin D low at 17 0  PCP prescribed vitamin D2 17848 units weekly which she completed      Patient Active Problem List   Diagnosis   • Dependence on nicotine from cigarettes   • STELLA (generalized anxiety disorder)   • Panic attacks   • Class 1 obesity   • Anxiety with depression   • Elevated alkaline phosphatase level   • Current every day smoker   • GERD (gastroesophageal reflux disease)   • Hydronephrosis with ureteropelvic junction (UPJ) obstruction   • Kidney stone on left side   • Ureteral calculus, left   • Left ureteral stone   • Anovulation   • Type 2 diabetes mellitus with hyperglycemia, with long-term current use of insulin (HCC)   • Mixed hyperlipidemia   • Vitamin D deficiency      Past Medical History:   Diagnosis Date   • Acid reflux    • Allergic     seasonal   • Anxiety    • Blurred vision    • Fainting    • Headache    • Irregular heart beat     Palpitations   • Loss of appetite    • Obesity    • Spontaneous vaginal delivery     x3   • Weakness       Past Surgical History:   Procedure Laterality Date   • FL RETROGRADE PYELOGRAM  2020   • FL RETROGRADE PYELOGRAM  2020   • NO PAST SURGERIES     • OR CYSTO/URETERO W/LITHOTRIPSY &INDWELL STENT INSRT Left 2020    Procedure: CYSTOSCOPY URETEROSCOPY , RETROGRADE PYELOGRAM AND INSERTION STENT URETERAL;  Surgeon: Verdell Gilford, MD;  [facility];  Service: Urology   • OR CYSTO/URETERO W/LITHOTRIPSY &INDWELL STENT INSRT Left 2020    Procedure: CYSTOSCOPY URETEROSCOPY WITH LITHOTRIPSY HOLMIUM LASER, RETROGRADE PYELOGRAM AND INSERTION STENT URETERAL;  Surgeon: Stu Zaragoza MD;  Location: [facility];  Service: Urology      Family History   Problem Relation Age of Onset   • Hypertension Mother    • Breast cancer Maternal Grandmother    • Diabetes Maternal Grandmother    • Thyroid disease Other    • Autism Other    • Anemia Father    • Leukemia Sister    • Diabetes Paternal Grandmother    • Fibroids Sister    • No Known Problems Brother    • No Known Problems Sister    • No Known Problems Sister    • Multiple sclerosis Cousin    • Colon cancer Neg Hx    • Cervical cancer Neg Hx    • Stroke Neg Hx      Social History     Tobacco Use   • Smoking status: Former Smoker     Packs/day: 0 25     Years: 20 00     Pack years: 5 00     Types: Cigarettes     Quit date: 2021     Years since quittin 6   • Smokeless tobacco: Never Used   • Tobacco comment: currently 7cigs/day   Substance Use Topics   • Alcohol use: Yes     Comment: monthly     Allergies   Allergen Reactions   • Other Itching     apples         Current Outpatient Medications:   •  Blood Glucose Monitoring Suppl (OneTouch Verio Reflect) w/Device KIT, Use 1 kit daily at bedtime E11 65, Please substitute with appropriate alternative as covered by patient's insurance , Disp: 1 kit, Rfl: 0  •  Cholecalciferol (Vitamin D) 50 MCG (2000 UT) CAPS, Take 1 capsule (2,000 Units total) by mouth in the morning, Disp: , Rfl:   •  glucose blood (OneTouch Verio) test strip, E11 65, Please substitute with appropriate alternative as covered by patient's insurance   Check sugars TID, Disp: 100 each, Rfl: 11  •  Insulin Pen Needle (Easy Touch Pen Needles) 31G X 8 MM MISC, Use daily at bedtime, Disp: 100 each, Rfl: 0  •  lisinopril (ZESTRIL) 2 5 mg tablet, Take 1 tablet (2 5 mg total) by mouth daily, Disp: 90 tablet, Rfl: 0  •  metFORMIN (GLUCOPHAGE-XR) 500 mg 24 hr tablet, Take 1 tablet (500 mg total) by mouth daily with dinner, Disp: , Rfl:   •  OneTouch Delica Lancets 08E MISC, Use daily at bedtime E11 65, Please substitute with appropriate alternative as covered by patient's insurance , Disp: 100 each, Rfl: 11  •  rosuvastatin (CRESTOR) 10 MG tablet, Take 0 5 tablets (5 mg total) by mouth every other day, Disp: 30 tablet, Rfl: 0  •  semaglutide, 1 mg/dose, (Ozempic, 1 MG/DOSE,) 4 MG/3ML SOPN injection pen, Inject 0 75 mL (1 mg total) under the skin once a week, Disp: 9 mL, Rfl: 0  •  buPROPion (Wellbutrin SR) 150 mg 12 hr tablet, Take 1tab PO QD x3days and then increase to BID, separate doses by atleast 8hrs and last dose no later than 6pm, stop smoking after 1wk of treatment (Patient not taking: Reported on 11/14/2022), Disp: 60 tablet, Rfl: 1  •  cyclobenzaprine (FLEXERIL) 5 mg tablet, Take 1 tablet (5 mg total) by mouth daily at bedtime (Patient not taking: Reported on 11/14/2022), Disp: 10 tablet, Rfl: 0  Review of Systems   Constitutional: Positive for fatigue  Negative for activity change, appetite change and unexpected weight change  HENT: Negative for trouble swallowing  Eyes: Negative for visual disturbance  Respiratory: Negative for shortness of breath  Cardiovascular: Negative for chest pain and palpitations  Gastrointestinal: Negative for constipation and diarrhea  Endocrine: Negative for polydipsia and polyuria  Musculoskeletal: Negative  Skin: Negative for wound  Neurological: Negative for numbness  Psychiatric/Behavioral: Negative  Physical Exam:  Body mass index is 30 04 kg/m²  /78   Pulse 96   Temp 98 1 °F (36 7 °C) (Skin)   Ht 5' 1" (1 549 m)   Wt 72 1 kg (159 lb)   BMI 30 04 kg/m²    Wt Readings from Last 3 Encounters:   11/14/22 72 1 kg (159 lb)   09/16/22 73 5 kg (162 lb)   08/09/22 72 6 kg (160 lb)       Physical Exam  Vitals and nursing note reviewed  Constitutional:       Appearance: She is well-developed  HENT:      Head: Normocephalic  Eyes:      General: No scleral icterus  Pupils: Pupils are equal, round, and reactive to light  Neck:      Thyroid: No thyromegaly  Cardiovascular:      Rate and Rhythm: Normal rate and regular rhythm  Pulses:           Radial pulses are 2+ on the right side and 2+ on the left side  Heart sounds: No murmur heard  Pulmonary:      Effort: Pulmonary effort is normal  No respiratory distress  Breath sounds: Normal breath sounds  No wheezing  Musculoskeletal:      Cervical back: Neck supple  Skin:     General: Skin is warm and dry  Neurological:      Mental Status: She is alert  Patient's shoes and socks were not removed            Labs:   Component      Latest Ref Rng & Units 8/3/2022 9/14/2022 11/11/2022   Sodium      135 - 147 mmol/L 134 (L) 137    Potassium      3 5 - 5 3 mmol/L 4 1 3 9    Chloride      96 - 108 mmol/L 100 105    CO2      21 - 32 mmol/L 26 27    Anion Gap      4 - 13 mmol/L 8 5    BUN      5 - 25 mg/dL 8 8    Creatinine      0 60 - 1 30 mg/dL 0 75 0 62    GLUCOSE FASTING      65 - 99 mg/dL 97 101 (H)    Calcium      8 4 - 10 2 mg/dL 9 8 9 3    AST      13 - 39 U/L 44 (H) 24    ALT      7 - 52 U/L 53 (H) 29    Alkaline Phosphatase      34 - 104 U/L 205 (H) 178 (H)    Total Protein      6 4 - 8 4 g/dL 8 8 (H) 7 9    Albumin      3 5 - 5 0 g/dL 3 9 3 9    TOTAL BILIRUBIN      0 20 - 1 00 mg/dL 0 91 0 59    eGFR      ml/min/1 73sq m 101 114    Cholesterol      See Comment mg/dL 217 (H)  246 (H)   Triglycerides      See Comment mg/dL 119  150 (H)   HDL      >=50 mg/dL 72  86   LDL Calculated      0 - 100 mg/dL 121 (H)  130 (H)   Non-HDL Cholesterol      mg/dl 145  160   EXT Creatinine Urine      mg/dL 247 0 164 0    MICROALBUM ,U,RANDOM      0 0 - 20 0 mg/L 28 9 (H) 6 7    MICROALBUMIN/CREATININE RATIO      0 - 30 mg/g creatinine 12 4    Hemoglobin A1C      Normal 3 8-5 6%; PreDiabetic 5 7-6 4%; Diabetic >=6 5%; Glycemic control for adults with diabetes <7 0% % 5 4     eAG, EST AVG Glucose      mg/dl 108     TSH 3RD GENERATON      0 450 - 4 500 uIU/mL 2 799         Plan:    Diagnoses and all orders for this visit:    Type 2 diabetes mellitus with hyperglycemia, without long-term current use of insulin (HCC)  HGA1C 5 2%  Improved  Treatment regimen: decrease metformin to 500 mg daily  Wants to stay on current dose of Ozempic for weight loss  Episodes of hypoglycemia can lead to permanent disability and death  Discussed risks/complications associated with uncontrolled diabetes  Advised to adhere to diabetic diet, and recommended staying active/exercising routinely as tolerated  Keep carbohydrates consistent to limit blood glucose fluctuations  Advised to call if blood sugars less than 70 mg/dl or over 250 mg/dl  Check blood glucose 1 time a day  Discussed symptoms and treatment of hypoglycemia     Recommended routine follow-up with podiatry and ophthalmology  Ordered blood work to complete prior to next visit  -     Hemoglobin A1C; Future  -     Basic metabolic panel; Future  -     Microalbumin / creatinine urine ratio; Future  -     POCT hemoglobin A1c  -     metFORMIN (GLUCOPHAGE-XR) 500 mg 24 hr tablet; Take 1 tablet (500 mg total) by mouth daily with dinner    Mixed hyperlipidemia    Continue statin therapy; cannot tolerate higher dose    Vitamin D deficiency  Vitamin D previously low at 17  Take vitamin D3 2000 IU daily  -     Vitamin D 25 hydroxy; Future  -     Cholecalciferol (Vitamin D) 50 MCG (2000 UT) CAPS; Take 1 capsule (2,000 Units total) by mouth in the morning          Discussed with the patient diagnosis and treatment and all questions fully answered  She will call me if any problems arise  Counseled patient on diagnostic results, prognosis, risk and benefit of treatment options, instruction for management, importance of treatment compliance, risk factor reduction and impressions        Va Galloway PA-C

## 2022-11-14 NOTE — PATIENT INSTRUCTIONS
Hypoglycemia instructions   [facility]  11/14/2022  [id number]    Low Blood Sugar    Steps to treat low blood sugar  1  Test blood sugar if you have symptoms of low blood sugar:   Low Blood Sugar Symptoms:  o Sweaty  o Dizzy  o Rapid heartbeat  o Shaky  o Bad mood  o Hungry      2  Treat blood sugar less than 70 with 15 grams of fast-acting carbohydrate:   Examples of 15 grams Fast-Acting Carbohydrate:  o 4 oz juice  o 4 oz regular soda  o 3-4 glucose tablets (chew)  o 3-4 hard candies (chew)          3  Wait 15 minutes and test your blood sugar again     4   If blood sugar is less than 100, repeat steps 2-3     5  When your blood sugar is 100 or more, eat a snack if it will be longer than one hour until your next meal  The snack should be 15 grams of carbohydrate and a protein:   Examples of snacks:  o ½ sandwich  o 6 crackers with cheese  o Piece of fruit with cheese or peanut butter  o 6 crackers with peanut butter

## 2022-11-28 DIAGNOSIS — E11.65 TYPE 2 DIABETES MELLITUS WITH HYPERGLYCEMIA, WITH LONG-TERM CURRENT USE OF INSULIN (HCC): ICD-10-CM

## 2022-11-28 DIAGNOSIS — Z79.4 TYPE 2 DIABETES MELLITUS WITH HYPERGLYCEMIA, WITH LONG-TERM CURRENT USE OF INSULIN (HCC): ICD-10-CM

## 2022-11-28 RX ORDER — SEMAGLUTIDE 1.34 MG/ML
INJECTION, SOLUTION SUBCUTANEOUS
Qty: 9 ML | Refills: 0 | Status: SHIPPED | OUTPATIENT
Start: 2022-11-28 | End: 2022-11-30 | Stop reason: SDUPTHER

## 2022-11-30 DIAGNOSIS — Z79.4 TYPE 2 DIABETES MELLITUS WITH HYPERGLYCEMIA, WITH LONG-TERM CURRENT USE OF INSULIN (HCC): ICD-10-CM

## 2022-11-30 DIAGNOSIS — E11.65 TYPE 2 DIABETES MELLITUS WITH HYPERGLYCEMIA, WITH LONG-TERM CURRENT USE OF INSULIN (HCC): ICD-10-CM

## 2022-11-30 RX ORDER — SEMAGLUTIDE 1.34 MG/ML
1 INJECTION, SOLUTION SUBCUTANEOUS WEEKLY
Qty: 9 ML | Refills: 0 | Status: SHIPPED | OUTPATIENT
Start: 2022-11-30

## 2023-02-06 DIAGNOSIS — Z79.4 TYPE 2 DIABETES MELLITUS WITH HYPERGLYCEMIA, WITH LONG-TERM CURRENT USE OF INSULIN (HCC): ICD-10-CM

## 2023-02-06 DIAGNOSIS — E11.65 TYPE 2 DIABETES MELLITUS WITH HYPERGLYCEMIA, WITH LONG-TERM CURRENT USE OF INSULIN (HCC): ICD-10-CM

## 2023-03-21 ENCOUNTER — OFFICE VISIT (OUTPATIENT)
Dept: BARIATRICS | Facility: CLINIC | Age: 38
End: 2023-03-21

## 2023-03-21 VITALS
DIASTOLIC BLOOD PRESSURE: 68 MMHG | BODY MASS INDEX: 30.47 KG/M2 | HEIGHT: 61 IN | WEIGHT: 161.4 LBS | SYSTOLIC BLOOD PRESSURE: 106 MMHG | HEART RATE: 98 BPM

## 2023-03-21 DIAGNOSIS — E66.9 OBESITY, CLASS I, BMI 30-34.9: Primary | ICD-10-CM

## 2023-03-21 DIAGNOSIS — E11.65 TYPE 2 DIABETES MELLITUS WITH HYPERGLYCEMIA, WITH LONG-TERM CURRENT USE OF INSULIN (HCC): ICD-10-CM

## 2023-03-21 DIAGNOSIS — E11.65 TYPE 2 DIABETES MELLITUS WITH HYPERGLYCEMIA, WITHOUT LONG-TERM CURRENT USE OF INSULIN (HCC): ICD-10-CM

## 2023-03-21 DIAGNOSIS — Z79.4 TYPE 2 DIABETES MELLITUS WITH HYPERGLYCEMIA, WITH LONG-TERM CURRENT USE OF INSULIN (HCC): ICD-10-CM

## 2023-03-21 NOTE — PROGRESS NOTES
Assessment/Plan:    Obesity, Class I, BMI 30-34 9  - Patient is pursuing Conservative Program  - Initial weight loss goal of 5-10% weight loss for improved health  - Labs reviewed from 11/2022 - will be getting new labs next month for endocrine  - Discussed increasing Ozempic dose vs switching to [facility] - patient may benefit from increased dose of Ozempic to help with hunger  Advised to contact endocrine to see if they will increase the dose to 2mg weekly as she has tolerated this medication well  Goals:  Do not skip meals  Calorie goal of 6115-3522 calories per day reviewed and meal plan given to patient for more meal options  Continue to food log via alix [email or web address]  [email or web address]  No sugary beverages  Continue with at least 64oz of water daily  Increase physical activity by 10 minutes daily with weight/resistance training to increase muscle mass  Gradually increase physical activity to a goal of 5 days per week for 30 minutes of MODERATE intensity PLUS 2 days per week of FULL BODY resistance training    Type 2 diabetes mellitus with hyperglycemia, without long-term current use of insulin ([address] )  Patient is currently on Ozempic 1mg weekly  Taken off metformin in 11/2022  Labs are stable and blood sugars at home are 80-85 in the morning  Followed by endocrinologist  Lab Results   Component Value Date    HGBA1C 5 2 11/14/2022          Gary Esparza was seen today for consult  Diagnoses and all orders for this visit:    Obesity, Class I, BMI 30-34 9    Type 2 diabetes mellitus with hyperglycemia, without long-term current use of insulin ([address] )       Patient denies personal history of pancreatitis  Patient also denies personal and family history of medullary thyroid cancer and multiple endocrine neoplasia type 2 (MEN 2 tumor)  Patient denies any history of seizures or glaucoma  Patient does have a history of kidney stones      Total time spent reviewing chart, interviewing patient, examining patient, discussing plan, answering all questions, and documentin min, with >50% face-to-face time spent counseling patient on nonsurgical interventions for the treatment of excess weight  Discussed in detail nonsurgical options including intensive lifestyle intervention program, very low-calorie diet program and conservative program   Discussed the role of weight loss medications  Counseled patient on diet behavior and exercise modification for weight loss  Follow up in approximately 6 weeks with Non-Surgical Physician/Advanced Practitioner  Subjective:   Chief Complaint   Patient presents with   • Consult     Pt is here today for MWM consult  Patient ID: Jacy Lowe  is a 44 y o  female with excess weight/obesity here to pursue weight management  Previous notes and records have been reviewed      Past Medical History:   Diagnosis Date   • Acid reflux    • Allergic     seasonal   • Anxiety    • Blurred vision    • Diabetes mellitus (HCC)    • Fainting    • Headache    • Irregular heart beat     Palpitations   • Loss of appetite    • Obesity    • Spontaneous vaginal delivery     x3   • Weakness      Past Surgical History:   Procedure Laterality Date   • FL RETROGRADE PYELOGRAM  2020   • [place] RETROGRADE PYELOGRAM  2020   • NO PAST SURGERIES     • PA CYSTO/URETERO W/LITHOTRIPSY &INDWELL STENT INSRT Left 2020    Procedure: CYSTOSCOPY URETEROSCOPY , RETROGRADE PYELOGRAM AND INSERTION STENT URETERAL;  Surgeon: Komal Rodrigues MD;  [facility];  Service: Urology   • PA CYSTO/URETERO W/LITHOTRIPSY &INDWELL STENT INSRT Left 2020    Procedure: CYSTOSCOPY URETEROSCOPY WITH LITHOTRIPSY HOLMIUM LASER, RETROGRADE PYELOGRAM AND INSERTION STENT URETERAL;  Surgeon: Carissa Esparza MD;  [facility];  Service: Urology       HPI:  Wt Readings from Last 20 Encounters:   23 73 2 kg (161 lb 6 4 oz)   22 72 1 kg (159 lb)   22 73 5 kg (162 lb)   22 72 6 kg (160 lb)   22 74 7 kg (164 lb 9 6 oz)   11/12/21 79 4 kg (175 lb)   08/20/21 78 kg (172 lb)   08/18/21 78 1 kg (172 lb 3 2 oz)   05/19/21 78 kg (172 lb)   05/05/21 79 3 kg (174 lb 12 8 oz)   04/30/21 79 4 kg (175 lb)   04/16/21 79 4 kg (175 lb)   04/07/21 76 2 kg (168 lb)   03/31/21 76 2 kg (168 lb)   02/22/21 83 9 kg (185 lb)   10/21/20 80 7 kg (178 lb)   04/22/20 80 9 kg (178 lb 6 4 oz)   02/26/20 80 7 kg (178 lb)   01/22/20 82 1 kg (181 lb)   01/04/20 82 4 kg (181 lb 10 5 oz)       Patient presents today to medical weight management office for consult  Patient was seen a few years ago for weight loss - was prescribed phentermine but due to a kidney stone was taken off the medication  Since her last visit has been diagnosed with DM - was on metformin and Lantus with Ozempic, now is only on Ozempic 1mg and is managed by an endocrinologist  She monitors her blood sugars daily her blood sugars are usually between 80-85 in the mornings  Her last A1C was 5 2  She states that she has been monitoring her diet and walking more for the past 5-6 months and has not lost any weight  She states her weight has hit a plateau and she still feels hungry on the current dose of Ozempic  Obesity/Excess Weight:  Severity: Mild  Onset: For years    Modifiers: Diet and Exercise, Physician Supervised Weight Loss Program, Commercial Weight Loss Programs-ie   Weight Watchers, [place] [employer], Nutrisystem, etc  and Prescription Weight Loss Medications  Contributing factors: Poor Food Choices and Stress/Emotional Eating  Associated symptoms: comorbid conditions, fatigue, increased joint pain, body image issues, decreased self esteem and clothes do not fit    Diet recall:  B: 1 egg, 2 pieces of kelsey  S: nuts or yogurt or granola bar  L: salad with vegetables and sometimes chicken or egg  S: nuts or yogurt or granola bar or sometimes pretzles  D: sausage and cabbage    Hydration: gallon of water daily  Alcohol: occasioanlly  Smoking: 3 cigarettes every 2 weeks  Exercise: walks everyday for about 1 hour with dog  Occupation: stay at home mom  Sleep: doesn't sleep well because of kids  STOP ban/8    Highest weight: 196 lbs  Current weight: 161 5 lbs  Goal weight: 130 lbs      The following portions of the patient's history were reviewed and updated as appropriate: allergies, current medications, past family history, past medical history, past social history, past surgical history, and problem list     Family History   Problem Relation Age of Onset   • Hypertension Mother    • Breast cancer Maternal Grandmother    • Diabetes Maternal Grandmother    • Thyroid disease Other    • Autism Other    • Anemia Father    • Leukemia Sister    • Diabetes Paternal Grandmother    • Fibroids Sister    • No Known Problems Brother    • No Known Problems Sister    • No Known Problems Sister    • Multiple sclerosis Cousin    • Colon cancer Neg Hx    • Cervical cancer Neg Hx    • Stroke Neg Hx         Review of Systems   Constitutional: Negative for fatigue  HENT: Negative for sore throat  Respiratory: Negative for cough and shortness of breath  Cardiovascular: Negative for chest pain, palpitations and leg swelling  Gastrointestinal: Negative for abdominal pain, constipation, diarrhea and nausea  Genitourinary: Negative for dysuria  Musculoskeletal: Negative for arthralgias and back pain  Skin: Negative for rash  Neurological: Negative for headaches  Psychiatric/Behavioral: Negative for dysphoric mood  The patient is not nervous/anxious  Objective:  /68 (BP Location: Right arm, Patient Position: Sitting, Cuff Size: Standard)   Pulse 98   Ht 5' 1" (1 549 m)   Wt 73 2 kg (161 lb 6 4 oz)   LMP 2023 (Exact Date)   BMI 30 50 kg/m²     Physical Exam  Vitals and nursing note reviewed  Constitutional:       Appearance: Normal appearance  She is obese  HENT:      Head: Normocephalic     Pulmonary:      Effort: Pulmonary effort is normal  Neurological:      General: No focal deficit present  Mental Status: She is alert and oriented to person, place, and time  Psychiatric:         Mood and Affect: Mood normal          Behavior: Behavior normal          Thought Content: Thought content normal          Judgment: Judgment normal                 Labs and Imaging  Recent labs and imaging have been personally reviewed    Lab Results   Component Value Date    WBC 9 10 09/14/2022    HGB 12 3 09/14/2022    HCT 37 0 09/14/2022    MCV 94 09/14/2022     09/14/2022     Lab Results   Component Value Date    SODIUM 137 09/14/2022    K 3 9 09/14/2022     09/14/2022    CO2 27 09/14/2022    AGAP 5 09/14/2022    BUN 8 09/14/2022    CREATININE 0 62 09/14/2022    GLUC 125 05/19/2021    GLUF 101 (H) 09/14/2022    CALCIUM 9 3 09/14/2022    AST 24 09/14/2022    ALT 29 09/14/2022    ALKPHOS 178 (H) 09/14/2022    TP 7 9 09/14/2022    TBILI 0 59 09/14/2022    EGFR 114 09/14/2022     Lab Results   Component Value Date    HGBA1C 5 2 11/14/2022     Lab Results   Component Value Date    SOG0GZYTWVUA 2 799 08/03/2022     Lab Results   Component Value Date    CHOLESTEROL 246 (H) 11/11/2022     Lab Results   Component Value Date    HDL 86 11/11/2022     Lab Results   Component Value Date    TRIG 150 (H) 11/11/2022     Lab Results   Component Value Date    LDLCALC 130 (H) 11/11/2022

## 2023-03-21 NOTE — TELEPHONE ENCOUNTER
Patient was at weight management today, and is requesting her dose of Ozempic be increase to 2mg  Pt would like it sent to [facility]

## 2023-03-21 NOTE — ASSESSMENT & PLAN NOTE
- Patient is pursuing Conservative Program  - Initial weight loss goal of 5-10% weight loss for improved health  - Labs reviewed from 11/2022 - will be getting new labs next month for endocrine  - Discussed increasing Ozempic dose vs switching to [facility] - patient may benefit from increased dose of Ozempic to help with hunger  Advised to contact endocrine to see if they will increase the dose to 2mg weekly as she has tolerated this medication well  Goals:  Do not skip meals  Calorie goal of 2974-7709 calories per day reviewed and meal plan given to patient for more meal options  Continue to food log via [employer] www  [email or web address] com  No sugary beverages  Continue with at least 64oz of water daily  Increase physical activity by 10 minutes daily with weight/resistance training to increase muscle mass   Gradually increase physical activity to a goal of 5 days per week for 30 minutes of MODERATE intensity PLUS 2 days per week of FULL BODY resistance training

## 2023-03-21 NOTE — ASSESSMENT & PLAN NOTE
Patient is currently on Ozempic 1mg weekly  Taken off metformin in 11/2022  Labs are stable and blood sugars at home are 80-85 in the morning  Followed by endocrinologist  Lab Results   Component Value Date    HGBA1C 5 2 11/14/2022

## 2023-03-29 ENCOUNTER — TELEPHONE (OUTPATIENT)
Dept: BARIATRICS | Facility: CLINIC | Age: 38
End: 2023-03-29

## 2023-03-29 NOTE — TELEPHONE ENCOUNTER
Patient called in today to request [facility] script after she did not hear back from her endocrinologist in regards to her increased Ozempic dosage  I was able to see in her chart that the Ozempic 2mg had been sent to her pharmacy on 3/21  Told patient that it should be at her pharmacy to be picked up  Patient verbalized understanding   Just sending this to you so you are aware that the Ozempic was increased by her endocrinologist

## 2023-05-02 ENCOUNTER — OFFICE VISIT (OUTPATIENT)
Dept: BARIATRICS | Facility: CLINIC | Age: 38
End: 2023-05-02

## 2023-05-02 VITALS
SYSTOLIC BLOOD PRESSURE: 108 MMHG | HEART RATE: 88 BPM | HEIGHT: 61 IN | WEIGHT: 157.2 LBS | DIASTOLIC BLOOD PRESSURE: 72 MMHG | BODY MASS INDEX: 29.68 KG/M2

## 2023-05-02 DIAGNOSIS — E66.3 OVERWEIGHT (BMI 25.0-29.9): Primary | ICD-10-CM

## 2023-05-02 NOTE — PROGRESS NOTES
Assessment/Plan:     Overweight (BMI 25 0-29 9)  - Patient is pursuing Conservative Program  - Initial weight loss goal of 5-10% weight loss for improved health  - Patient's dose of Ozempic was increased to 2mg weekly by endocrinologist and patient noticed decrease in her hunger  She has lost about 4 lbs since last visit and is feeling well  Occasionally suffering from cravings - discussed started Wellbutrin but patient wishes to wait at this time  Encouraged a calorie goal of 3811-2050 and to start tracking foods again  Continue with daily walks - may want to increase some resistance training for muscle toning  Continue with water intake and avoid sugary beverages  Goals:  Do not skip meals  Food log via alix - options include  www  myfitnesspal com, sparkpeople  com, loseit com, calorieking  com, [employer]  No sugary beverages  At least 64oz of water daily  Increase physical activity by 10 minutes daily  Gradually increase physical activity to a goal of 5 days per week for 30 minutes of MODERATE intensity PLUS 2 days per week of FULL BODY resistance training         Felipe Shields was seen today for follow-up  Diagnoses and all orders for this visit:    Overweight (BMI 25 0-29  9)        Total time spent reviewing chart, interviewing patient, examining patient, discussing plan, answering all questions, and documentin minutes with >50% face-to-face time with the patient  Follow up in approximately 4-6 weeks with Non-Surgical Physician/Advanced Practitioner  Subjective:   Chief Complaint   Patient presents with    Follow-up     Pt is here today for MWM f/u  Patient ID: Emma Archuleta  is a 43 y o  female with excess weight/obesity here to pursue weight management  Patient is pursuing Conservative Program    Most recent notes and records were reviewed      HPI    Wt Readings from Last 20 Encounters:   23 71 3 kg (157 lb 3 2 oz)   23 73 2 kg (161 lb 6 4 oz)   22 72 1 kg (159 lb)   09/16/22 73 5 kg (162 lb)   08/09/22 72 6 kg (160 lb)   04/26/22 74 7 kg (164 lb 9 6 oz)   11/12/21 79 4 kg (175 lb)   08/20/21 78 kg (172 lb)   08/18/21 78 1 kg (172 lb 3 2 oz)   05/19/21 78 kg (172 lb)   05/05/21 79 3 kg (174 lb 12 8 oz)   04/30/21 79 4 kg (175 lb)   04/16/21 79 4 kg (175 lb)   04/07/21 76 2 kg (168 lb)   03/31/21 76 2 kg (168 lb)   02/22/21 83 9 kg (185 lb)   10/21/20 80 7 kg (178 lb)   04/22/20 80 9 kg (178 lb 6 4 oz)   02/26/20 80 7 kg (178 lb)   01/22/20 82 1 kg (181 lb)       Patient presents today to medical weight management office for follow up  Patient is doing well and has lost weight since last visit  Endocrinologist increase dose of Ozempic to 2mg weekly and patient notices improvement in her hunger  She does experience episodes of hunger/cravings at times but isn't interested in any other medications at this time  She is continuing to work on stress  She is walking daily and making sure her heart rate is in the aerobic zone on her watch  She is trying to incorporate more low carb options and increase proteins      Weight loss medication and dose: Ozempic 2mg  Started date and weight: 161 5 lbs  Current weight: 157 2 lbs  Difference: -4 3 lbs  Goal weight: 130 lbs      Diet recall:  B: 1 egg, 2 pieces of kelsey  S: nuts or yogurt or granola bar  L: salad with vegetables and sometimes chicken or egg  S: nuts or yogurt or granola bar or sometimes pretzles  D: sausage and cabbage    Hydration: gallon of water daily  Alcohol: occasioanlly  Smoking: 3 cigarettes every 2 weeks  Exercise: walks everyday for about 1 hour with dog    Highest weight: 196 lbs  Current weight: 161 5 lbs  Goal weight: 130 lbs      The following portions of the patient's history were reviewed and updated as appropriate: allergies, current medications, past family history, past medical history, past social history, past surgical history, and problem list     Family History   Problem Relation Age of Onset    "Hypertension Mother     Breast cancer Maternal Grandmother     Diabetes Maternal Grandmother     Thyroid disease Other     Autism Other     Anemia Father     Leukemia Sister     Diabetes Paternal Grandmother     Fibroids Sister     No Known Problems Brother     No Known Problems Sister     No Known Problems Sister     Multiple sclerosis Cousin     Colon cancer Neg Hx     Cervical cancer Neg Hx     Stroke Neg Hx         Review of Systems   Constitutional: Negative for fatigue  HENT: Negative for sore throat  Respiratory: Negative for cough and shortness of breath  Cardiovascular: Negative for chest pain, palpitations and leg swelling  Gastrointestinal: Negative for abdominal pain, constipation, diarrhea and nausea  Genitourinary: Negative for dysuria  Musculoskeletal: Negative for arthralgias and back pain  Skin: Negative for rash  Neurological: Negative for headaches  Psychiatric/Behavioral: Negative for dysphoric mood  The patient is not nervous/anxious  Objective:  /72 (BP Location: Left arm, Patient Position: Sitting, Cuff Size: Standard)   Pulse 88   Ht 5' 1\" (1 549 m)   Wt 71 3 kg (157 lb 3 2 oz)   LMP 05/02/2023 (Exact Date)   BMI 29 70 kg/m²     Physical Exam  Vitals and nursing note reviewed  Constitutional:       Appearance: Normal appearance  She is obese  HENT:      Head: Normocephalic  Pulmonary:      Effort: Pulmonary effort is normal    Neurological:      General: No focal deficit present  Mental Status: She is alert and oriented to person, place, and time  Psychiatric:         Mood and Affect: Mood normal          Behavior: Behavior normal          Thought Content:  Thought content normal          Judgment: Judgment normal             Labs   Most recent labs reviewed   Lab Results   Component Value Date    SODIUM 137 09/14/2022    K 3 9 09/14/2022     09/14/2022    CO2 27 09/14/2022    AGAP 5 09/14/2022    BUN 8 09/14/2022    " CREATININE 0 62 09/14/2022    GLUC 125 05/19/2021    GLUF 101 (H) 09/14/2022    CALCIUM 9 3 09/14/2022    AST 24 09/14/2022    ALT 29 09/14/2022    ALKPHOS 178 (H) 09/14/2022    TP 7 9 09/14/2022    TBILI 0 59 09/14/2022    EGFR 114 09/14/2022     Lab Results   Component Value Date    HGBA1C 5 2 11/14/2022     Lab Results   Component Value Date    TOS6IUYYLJVF 2 799 08/03/2022     Lab Results   Component Value Date    CHOLESTEROL 246 (H) 11/11/2022     Lab Results   Component Value Date    HDL 86 11/11/2022     Lab Results   Component Value Date    TRIG 150 (H) 11/11/2022     Lab Results   Component Value Date    LDLCALC 130 (H) 11/11/2022

## 2023-05-02 NOTE — ASSESSMENT & PLAN NOTE
- Patient is pursuing Conservative Program  - Initial weight loss goal of 5-10% weight loss for improved health  - Patient's dose of Ozempic was increased to 2mg weekly by endocrinologist and patient noticed decrease in her hunger  She has lost about 4 lbs since last visit and is feeling well  Occasionally suffering from cravings - discussed started Wellbutrin but patient wishes to wait at this time  Encouraged a calorie goal of 1499-8068 and to start tracking foods again  Continue with daily walks - may want to increase some resistance training for muscle toning  Continue with water intake and avoid sugary beverages  Goals:  Do not skip meals  Food log via alix - options include  www  myfitnesspal com, sparkpeople  com, loseit com, calorieking  com, [employer]  No sugary beverages  At least 64oz of water daily  Increase physical activity by 10 minutes daily   Gradually increase physical activity to a goal of 5 days per week for 30 minutes of MODERATE intensity PLUS 2 days per week of FULL BODY resistance training

## 2023-05-16 ENCOUNTER — LAB (OUTPATIENT)
Dept: LAB | Facility: CLINIC | Age: 38
End: 2023-05-16

## 2023-05-16 DIAGNOSIS — E11.65 TYPE 2 DIABETES MELLITUS WITH HYPERGLYCEMIA, WITHOUT LONG-TERM CURRENT USE OF INSULIN (HCC): ICD-10-CM

## 2023-05-16 DIAGNOSIS — E55.9 VITAMIN D DEFICIENCY: ICD-10-CM

## 2023-05-16 LAB
25(OH)D3 SERPL-MCNC: 26.3 NG/ML (ref 30–100)
ANION GAP SERPL CALCULATED.3IONS-SCNC: 6 MMOL/L (ref 4–13)
BUN SERPL-MCNC: 9 MG/DL (ref 5–25)
CALCIUM SERPL-MCNC: 9.3 MG/DL (ref 8.4–10.2)
CHLORIDE SERPL-SCNC: 105 MMOL/L (ref 96–108)
CO2 SERPL-SCNC: 26 MMOL/L (ref 21–32)
CREAT SERPL-MCNC: 0.6 MG/DL (ref 0.6–1.3)
CREAT UR-MCNC: 252 MG/DL
EST. AVERAGE GLUCOSE BLD GHB EST-MCNC: 103 MG/DL
GFR SERPL CREATININE-BSD FRML MDRD: 115 ML/MIN/1.73SQ M
GLUCOSE P FAST SERPL-MCNC: 91 MG/DL (ref 65–99)
HBA1C MFR BLD: 5.2 %
MICROALBUMIN UR-MCNC: 21.3 MG/L (ref 0–20)
MICROALBUMIN/CREAT 24H UR: 8 MG/G CREATININE (ref 0–30)
POTASSIUM SERPL-SCNC: 3.9 MMOL/L (ref 3.5–5.3)
SODIUM SERPL-SCNC: 137 MMOL/L (ref 135–147)

## 2023-05-23 ENCOUNTER — OFFICE VISIT (OUTPATIENT)
Dept: ENDOCRINOLOGY | Facility: CLINIC | Age: 38
End: 2023-05-23

## 2023-05-23 VITALS
WEIGHT: 154.9 LBS | SYSTOLIC BLOOD PRESSURE: 110 MMHG | HEIGHT: 61 IN | BODY MASS INDEX: 29.24 KG/M2 | HEART RATE: 74 BPM | DIASTOLIC BLOOD PRESSURE: 74 MMHG | OXYGEN SATURATION: 96 %

## 2023-05-23 DIAGNOSIS — Z79.4 TYPE 2 DIABETES MELLITUS WITH HYPERGLYCEMIA, WITH LONG-TERM CURRENT USE OF INSULIN (HCC): Primary | ICD-10-CM

## 2023-05-23 DIAGNOSIS — E11.65 TYPE 2 DIABETES MELLITUS WITH HYPERGLYCEMIA, WITH LONG-TERM CURRENT USE OF INSULIN (HCC): Primary | ICD-10-CM

## 2023-05-23 DIAGNOSIS — E78.2 MIXED HYPERLIPIDEMIA: ICD-10-CM

## 2023-05-23 DIAGNOSIS — E55.9 VITAMIN D DEFICIENCY: ICD-10-CM

## 2023-05-23 NOTE — PROGRESS NOTES
Denita Mathur 43 y o  female MRN: [id number]    Encounter: [id number]      Assessment/Plan     Assessment: This is a 43y o -year-old female with diabetes with hyperglycemia  Plan:    Diagnoses and all orders for this visit:    Type 2 diabetes mellitus with hyperglycemia, with long-term current use of insulin ([address] )    Lab Results   Component Value Date    HGBA1C 5 2 05/16/2023   A1c is well controlled 5 2%  Continue Ozempic 2 mg once a week  Continue lifestyle modifications  Follow-up in 6 months  -     Vitamin D 25 hydroxy; Future  -     Basic metabolic panel; Future  -     Hemoglobin A1C; Future    Mixed hyperlipidemia  Lab Results   Component Value Date    LDLCALC 130 (H) 11/11/2022   LDL is not controlled, patient is currently taking Crestor 5 mg every other day  Started by primary care physician  Will need repeat lipid profile  Educated about low-fat diet and exercise routine at least 30 minutes daily    Vitamin D deficiency  Continue vitamin D3 supplementation 2000 IU daily      CC: Diabetes    History of Present Illness     HPI:    Denita Mathur is a 42-year-old woman with medical history of type 2 diabetes, hyperlipidemia, vitamin D deficiency is here for follow-up  He has stable course for type 2 diabetes     Current management includes Ozempic 2 mg once a week  sHe follows diet, exercises regularly    For hypertension, she takes lisinopril 2 5 mg daily, for hyperlipidemia she takes Crestor 5 mg, every other day  She checks blood sugars once daily and her blood sugars are usually in average 106 mg per DL range  She has lost, 5 to 6 pounds, March 2023  She  any recent hospitalization for hyperglycemia or hypoglycemia      Lab Results   Component Value Date    HGBA1C 5 2 05/16/2023     Eye appt - up to date, last appt- few months ago   Feet checked by Dr Candance Spindle Readings from Last 3 Encounters:   05/23/23 70 3 kg (154 lb 14 4 oz)   05/02/23 71 3 kg (157 lb 3 2 oz)   03/21/23 73 2 kg (161 lb 6 4 oz)        Review of Systems   Constitutional: Negative for activity change, diaphoresis, fatigue, fever and unexpected weight change  HENT: Negative  Eyes: Negative for visual disturbance  Respiratory: Negative for cough, chest tightness and shortness of breath  Cardiovascular: Negative for chest pain, palpitations and leg swelling  Gastrointestinal: Negative for abdominal pain, blood in stool, constipation, diarrhea, nausea and vomiting  Endocrine: Negative for cold intolerance, heat intolerance, polydipsia, polyphagia and polyuria  Genitourinary: Negative for dysuria, enuresis, frequency and urgency  Musculoskeletal: Negative for arthralgias and myalgias  Skin: Negative for pallor, rash and wound  Allergic/Immunologic: Negative  Neurological: Negative for dizziness, tremors, weakness and numbness  Hematological: Negative  Psychiatric/Behavioral: Negative          Historical Information   Past Medical History:   Diagnosis Date   • Acid reflux    • Allergic     seasonal   • Anxiety    • Blurred vision    • Diabetes mellitus (HCC)    • Fainting    • Headache    • Irregular heart beat     Palpitations   • Loss of appetite    • Obesity    • Spontaneous vaginal delivery     x3   • Weakness      Past Surgical History:   Procedure Laterality Date   • FL RETROGRADE PYELOGRAM  1/4/2020   • FL RETROGRADE PYELOGRAM  1/22/2020   • NO PAST SURGERIES     • ID CYSTO/URETERO W/LITHOTRIPSY &INDWELL STENT INSRT Left 1/4/2020    Procedure: CYSTOSCOPY URETEROSCOPY , RETROGRADE PYELOGRAM AND INSERTION STENT URETERAL;  Surgeon: Melvin Theodore MD;  Location: [facility];  Service: Urology   • ID CYSTO/URETERO W/LITHOTRIPSY &INDWELL STENT INSRT Left 1/22/2020    Procedure: CYSTOSCOPY URETEROSCOPY WITH LITHOTRIPSY HOLMIUM LASER, RETROGRADE PYELOGRAM AND INSERTION STENT URETERAL;  Surgeon: Shruti Rolon MD;  Location: [facility];  Service: Urology     Social History   Social History Substance and Sexual Activity   Alcohol Use Yes    Comment: monthly     Social History     Substance and Sexual Activity   Drug Use Never     Social History     Tobacco Use   Smoking Status Some Days   • Packs/day: 0 25   • Years: 20 00   • Pack years: 5 00   • Types: Cigarettes   Smokeless Tobacco Never   Tobacco Comments    currently 7cigs/day     Family History:   Family History   Problem Relation Age of Onset   • Hypertension Mother    • Breast cancer Maternal Grandmother    • Diabetes Maternal Grandmother    • Thyroid disease Other    • Autism Other    • Anemia Father    • Leukemia Sister    • Diabetes Paternal Grandmother    • Fibroids Sister    • No Known Problems Brother    • No Known Problems Sister    • No Known Problems Sister    • Multiple sclerosis Cousin    • Colon cancer Neg Hx    • Cervical cancer Neg Hx    • Stroke Neg Hx        Meds/Allergies   Current Outpatient Medications   Medication Sig Dispense Refill   • Blood Glucose Monitoring Suppl (OneTouch Verio Reflect) w/Device KIT Use 1 kit daily at bedtime E11 65, Please substitute with appropriate alternative as covered by patient's insurance  1 kit 0   • glucose blood (OneTouch Verio) test strip E11 65, Please substitute with appropriate alternative as covered by patient's insurance  Check sugars  each 11   • Insulin Pen Needle (Easy Touch Pen Needles) 31G X 8 MM MISC Use daily at bedtime 100 each 0   • lisinopril (ZESTRIL) 2 5 mg tablet Take 1 tablet (2 5 mg total) by mouth daily 90 tablet 0   • OneTouch Delica Lancets 38V MISC Use daily at bedtime E11 65, Please substitute with appropriate alternative as covered by patient's insurance   100 each 11   • rosuvastatin (CRESTOR) 10 MG tablet Take 0 5 tablets (5 mg total) by mouth every other day 30 tablet 0   • semaglutide, 2 mg/dose, (Ozempic) 8 mg/ mL injection pen Inject 0 75 mL (2 mg total) under the skin every 7 days 10 mL 1     No current facility-administered medications for this "visit  Allergies   Allergen Reactions   • Other Itching     apples       Objective   Vitals: Blood pressure 110/74, pulse 74, height 5' 0 98\" (1 549 m), weight 70 3 kg (154 lb 14 4 oz), last menstrual period 05/02/2023, SpO2 96 %  Physical Exam  Vitals reviewed  Constitutional:       General: She is not in acute distress  Appearance: Normal appearance  She is not ill-appearing  HENT:      Head: Normocephalic and atraumatic  Nose: Nose normal    Eyes:      Extraocular Movements: Extraocular movements intact  Conjunctiva/sclera: Conjunctivae normal    Cardiovascular:      Rate and Rhythm: Normal rate and regular rhythm  Pulses: Normal pulses  Heart sounds: Normal heart sounds  Pulmonary:      Effort: Pulmonary effort is normal  No respiratory distress  Breath sounds: Normal breath sounds  Musculoskeletal:         General: Normal range of motion  Cervical back: Normal range of motion and neck supple  Right lower leg: No edema  Left lower leg: No edema  Skin:     General: Skin is warm and dry  Findings: No rash  Neurological:      General: No focal deficit present  Mental Status: She is alert and oriented to person, place, and time  Psychiatric:         Mood and Affect: Mood normal          Behavior: Behavior normal          The history was obtained from the review of the chart, patient      Lab Results:   Lab Results   Component Value Date/Time    Hemoglobin A1C 5 2 05/16/2023 09:37 AM    Hemoglobin A1C 5 2 11/14/2022 04:25 PM    Hemoglobin A1C 5 4 08/03/2022 08:27 AM    WBC 9 10 09/14/2022 07:07 AM    WBC 9 49 08/08/2022 07:15 AM    Hemoglobin 12 3 09/14/2022 07:07 AM    Hemoglobin 11 9 08/08/2022 07:15 AM    Hematocrit 37 0 09/14/2022 07:07 AM    Hematocrit 36 0 08/08/2022 07:15 AM    MCV 94 09/14/2022 07:07 AM    MCV 93 08/08/2022 07:15 AM    Platelets 883 73/52/1899 07:07 AM    Platelets 412 (H) 11/34/4119 07:15 AM    BUN 9 05/16/2023 09:37 " "AM    BUN 8 09/14/2022 07:07 AM    BUN 8 08/03/2022 08:27 AM    Potassium 3 9 05/16/2023 09:37 AM    Potassium 3 9 09/14/2022 07:07 AM    Potassium 4 1 08/03/2022 08:27 AM    Chloride 105 05/16/2023 09:37 AM    Chloride 105 09/14/2022 07:07 AM    Chloride 100 08/03/2022 08:27 AM    CO2 26 05/16/2023 09:37 AM    CO2 27 09/14/2022 07:07 AM    CO2 26 08/03/2022 08:27 AM    Creatinine 0 60 05/16/2023 09:37 AM    Creatinine 0 62 09/14/2022 07:07 AM    Creatinine 0 75 08/03/2022 08:27 AM    AST 24 09/14/2022 07:07 AM    AST 44 (H) 08/03/2022 08:27 AM    ALT 29 09/14/2022 07:07 AM    ALT 53 (H) 08/03/2022 08:27 AM    Albumin 3 9 09/14/2022 07:07 AM    Albumin 3 9 08/03/2022 08:27 AM    HDL, Direct 86 11/11/2022 07:46 AM    HDL, Direct 72 08/03/2022 08:27 AM    Triglycerides 150 (H) 11/11/2022 07:46 AM    Triglycerides 119 08/03/2022 08:27 AM           Imaging Studies: I have personally reviewed pertinent reports  Portions of the record may have been created with voice recognition software  Occasional wrong word or \"sound a like\" substitutions may have occurred due to the inherent limitations of voice recognition software  Read the chart carefully and recognize, using context, where substitutions have occurred    "

## 2023-08-01 DIAGNOSIS — E78.5 HYPERLIPIDEMIA LDL GOAL <70: ICD-10-CM

## 2023-08-02 RX ORDER — ROSUVASTATIN CALCIUM 10 MG/1
5 TABLET, COATED ORAL EVERY OTHER DAY
Qty: 15 TABLET | Refills: 0 | OUTPATIENT
Start: 2023-08-02

## 2023-08-21 DIAGNOSIS — Z79.4 TYPE 2 DIABETES MELLITUS WITH HYPERGLYCEMIA, WITH LONG-TERM CURRENT USE OF INSULIN (HCC): ICD-10-CM

## 2023-08-21 DIAGNOSIS — E78.5 HYPERLIPIDEMIA LDL GOAL <70: ICD-10-CM

## 2023-08-21 DIAGNOSIS — E11.65 TYPE 2 DIABETES MELLITUS WITH HYPERGLYCEMIA, WITH LONG-TERM CURRENT USE OF INSULIN (HCC): ICD-10-CM

## 2023-08-22 RX ORDER — ROSUVASTATIN CALCIUM 10 MG/1
5 TABLET, COATED ORAL
Qty: 30 TABLET | Refills: 0 | Status: SHIPPED | OUTPATIENT
Start: 2023-08-22

## 2023-11-10 ENCOUNTER — LAB (OUTPATIENT)
Dept: LAB | Facility: CLINIC | Age: 38
End: 2023-11-10
Payer: COMMERCIAL

## 2023-11-10 DIAGNOSIS — Z79.4 TYPE 2 DIABETES MELLITUS WITH HYPERGLYCEMIA, WITH LONG-TERM CURRENT USE OF INSULIN (HCC): ICD-10-CM

## 2023-11-10 DIAGNOSIS — E11.65 TYPE 2 DIABETES MELLITUS WITH HYPERGLYCEMIA, WITH LONG-TERM CURRENT USE OF INSULIN (HCC): ICD-10-CM

## 2023-11-10 LAB
25(OH)D3 SERPL-MCNC: 27.1 NG/ML (ref 30–100)
ANION GAP SERPL CALCULATED.3IONS-SCNC: 6 MMOL/L
BUN SERPL-MCNC: 10 MG/DL (ref 5–25)
CALCIUM SERPL-MCNC: 9 MG/DL (ref 8.4–10.2)
CHLORIDE SERPL-SCNC: 106 MMOL/L (ref 96–108)
CO2 SERPL-SCNC: 25 MMOL/L (ref 21–32)
CREAT SERPL-MCNC: 0.65 MG/DL (ref 0.6–1.3)
EST. AVERAGE GLUCOSE BLD GHB EST-MCNC: 103 MG/DL
GFR SERPL CREATININE-BSD FRML MDRD: 112 ML/MIN/1.73SQ M
GLUCOSE P FAST SERPL-MCNC: 83 MG/DL (ref 65–99)
HBA1C MFR BLD: 5.2 %
POTASSIUM SERPL-SCNC: 3.8 MMOL/L (ref 3.5–5.3)
SODIUM SERPL-SCNC: 137 MMOL/L (ref 135–147)

## 2023-11-10 PROCEDURE — 36415 COLL VENOUS BLD VENIPUNCTURE: CPT

## 2023-11-10 PROCEDURE — 83036 HEMOGLOBIN GLYCOSYLATED A1C: CPT

## 2023-11-10 PROCEDURE — 80048 BASIC METABOLIC PNL TOTAL CA: CPT

## 2023-11-10 PROCEDURE — 82306 VITAMIN D 25 HYDROXY: CPT

## 2023-12-06 DIAGNOSIS — E11.65 TYPE 2 DIABETES MELLITUS WITH HYPERGLYCEMIA, WITH LONG-TERM CURRENT USE OF INSULIN (HCC): ICD-10-CM

## 2023-12-06 DIAGNOSIS — Z79.4 TYPE 2 DIABETES MELLITUS WITH HYPERGLYCEMIA, WITH LONG-TERM CURRENT USE OF INSULIN (HCC): ICD-10-CM

## 2023-12-07 ENCOUNTER — OFFICE VISIT (OUTPATIENT)
Dept: ENDOCRINOLOGY | Facility: CLINIC | Age: 38
End: 2023-12-07
Payer: COMMERCIAL

## 2023-12-07 VITALS
HEART RATE: 74 BPM | SYSTOLIC BLOOD PRESSURE: 104 MMHG | OXYGEN SATURATION: 98 % | BODY MASS INDEX: 27.6 KG/M2 | DIASTOLIC BLOOD PRESSURE: 70 MMHG | WEIGHT: 146 LBS

## 2023-12-07 DIAGNOSIS — Z13.29 THYROID DISORDER SCREENING: ICD-10-CM

## 2023-12-07 DIAGNOSIS — E11.65 TYPE 2 DIABETES MELLITUS WITH HYPERGLYCEMIA, WITH LONG-TERM CURRENT USE OF INSULIN (HCC): ICD-10-CM

## 2023-12-07 DIAGNOSIS — E78.2 MIXED HYPERLIPIDEMIA: ICD-10-CM

## 2023-12-07 DIAGNOSIS — Z79.4 TYPE 2 DIABETES MELLITUS WITH HYPERGLYCEMIA, WITH LONG-TERM CURRENT USE OF INSULIN (HCC): ICD-10-CM

## 2023-12-07 DIAGNOSIS — E55.9 VITAMIN D DEFICIENCY: Primary | ICD-10-CM

## 2023-12-07 PROCEDURE — 99214 OFFICE O/P EST MOD 30 MIN: CPT | Performed by: INTERNAL MEDICINE

## 2023-12-07 RX ORDER — SEMAGLUTIDE 2.68 MG/ML
INJECTION, SOLUTION SUBCUTANEOUS
Qty: 9 ML | Refills: 0 | Status: SHIPPED | OUTPATIENT
Start: 2023-12-07

## 2023-12-07 NOTE — PROGRESS NOTES
Martha Fletcher 43 y.o. female MRN: [id number]    Encounter: [id number]      Assessment/Plan     Assessment: This is a 43y.o.-year-old female with diabetes with hyperglycemia. Plan:    Diagnoses and all orders for this visit:    Type 2 diabetes mellitus with hyperglycemia, with long-term current use of insulin ([address])  Lab Results   Component Value Date    HGBA1C 5.2 11/10/2023   Her A1c is 5.2%, well-controlled. Continue Ozempic 2 mg once a week  Continue lifestyle modifications. Follow-up in 6 months    -     Comprehensive metabolic panel; Future  -     Albumin / creatinine urine ratio; Future  -     Hemoglobin A1C; Future    Mixed hyperlipidemia  Continue statins, LDL is at goal.  Continue lifestyle modifications    Vitamin D deficiency  Take vitamin D3 supplementation, 1000 IU daily as patient has history of kidney stones  Thyroid disorder screening  Previously TSH was slightly elevated. Repeat TSH and free T4  -     T4, free; Future  -     TSH, 3rd generation; Future         CC: Diabetes    History of Present Illness     HPI:  Martha Fletcher is a 26-year-old woman with medical history of type 2 diabetes, hyperlipidemia, vitamin D deficiency is here for follow-up. Diabetes course has been stable. Current management includes Ozempic 2 mg once a week. He has lost weight 8 pounds. For hypertension she takes lisinopril 2.5 mg daily, for hyperlipidemia she takes Crestor 5 mg every other day. She checks blood sugars once daily and blood sugars are usually in 100 to 160 mg per DL range  She has lost 8 pounds.   She denies any recent hospitalization for hyperglycemia or hypoglycemia    Her last TSH was 2.7     Lab Results   Component Value Date    GIR5BEIQJEDT 2.799 08/03/2022         Lab Results   Component Value Date    LDLCALC 130 (H) 11/11/2022       Lab Results   Component Value Date    HGBA1C 5.2 11/10/2023         Wt Readings from Last 3 Encounters:   12/07/23 66.2 kg (146 lb)   05/23/23 70.3 kg (154 lb 14.4 oz)   05/02/23 71.3 kg (157 lb 3.2 oz)      Review of Systems   Constitutional:  Negative for activity change, diaphoresis, fatigue, fever and unexpected weight change. HENT: Negative. Eyes:  Negative for visual disturbance. Respiratory:  Negative for cough, chest tightness and shortness of breath. Cardiovascular:  Negative for chest pain, palpitations and leg swelling. Gastrointestinal:  Negative for abdominal pain, blood in stool, constipation, diarrhea, nausea and vomiting. Endocrine: Negative for cold intolerance, heat intolerance, polydipsia, polyphagia and polyuria. Genitourinary:  Negative for dysuria, enuresis, frequency and urgency. Musculoskeletal:  Negative for arthralgias and myalgias. Skin:  Negative for pallor, rash and wound. Allergic/Immunologic: Negative. Neurological:  Negative for dizziness, tremors, weakness and numbness. Hematological: Negative. Psychiatric/Behavioral: Negative.          Historical Information   Past Medical History:   Diagnosis Date    Acid reflux     Allergic     seasonal    Anxiety     Blurred vision     Diabetes mellitus (HCC)     Fainting     Headache     Irregular heart beat     Palpitations    Loss of appetite     Obesity     Spontaneous vaginal delivery     x3    Weakness      Past Surgical History:   Procedure Laterality Date    FL RETROGRADE PYELOGRAM  1/4/2020    FL RETROGRADE PYELOGRAM  1/22/2020    NO PAST SURGERIES      [place] CYSTO/URETERO W/LITHOTRIPSY &INDWELL STENT INSRT Left 1/4/2020    Procedure: CYSTOSCOPY URETEROSCOPY , RETROGRADE PYELOGRAM AND INSERTION STENT URETERAL;  Surgeon: Zack Espinoza MD;  [facility];  Service: Urology    TX CYSTO/URETERO W/LITHOTRIPSY &INDWELL STENT INSRT Left 1/22/2020    Procedure: CYSTOSCOPY URETEROSCOPY WITH LITHOTRIPSY HOLMIUM LASER, RETROGRADE PYELOGRAM AND INSERTION STENT URETERAL;  Surgeon: Scott Moseley MD;  Location[facility];  Service: Urology     Social History   Social History     Substance and Sexual Activity   Alcohol Use Yes    Comment: monthly     Social History     Substance and Sexual Activity   Drug Use Never     Social History     Tobacco Use   Smoking Status Some Days    Packs/day: 0.25    Years: 20.00    Total pack years: 5.00    Types: Cigarettes   Smokeless Tobacco Never   Tobacco Comments    currently 7cigs/day     Family History:   Family History   Problem Relation Age of Onset    Hypertension Mother     Breast cancer Maternal Grandmother     Diabetes Maternal Grandmother     Thyroid disease Other     Autism Other     Anemia Father     Leukemia Sister     Diabetes Paternal Grandmother     Fibroids Sister     No Known Problems Brother     No Known Problems Sister     No Known Problems Sister     Multiple sclerosis Cousin     Colon cancer Neg Hx     Cervical cancer Neg Hx     Stroke Neg Hx        Meds/Allergies   Current Outpatient Medications   Medication Sig Dispense Refill    Ozempic, 2 MG/DOSE, 8 MG/3ML injection pen INJECT 0.75ML (2MG TOTAL) SUBCUTANEOUSLY EVERY 7 DAYS 9 mL 0    rosuvastatin (CRESTOR) 10 MG tablet Take 0.5 tablets (5 mg total) by mouth every other day 30 tablet 0    Blood Glucose Monitoring Suppl (OneTouch Verio Reflect) w/Device KIT Use 1 kit daily at bedtime E11.65, Please substitute with appropriate alternative as covered by patient's insurance. 1 kit 0    glucose blood (OneTouch Verio) test strip E11.65, Please substitute with appropriate alternative as covered by patient's insurance. Check sugars  each 11    Insulin Pen Needle (Easy Touch Pen Needles) 31G X 8 MM MISC Use daily at bedtime 100 each 0    lisinopril (ZESTRIL) 2.5 mg tablet Take 1 tablet (2.5 mg total) by mouth daily 90 tablet 0    OneTouch Delica Lancets 02C MISC Use daily at bedtime E11.65, Please substitute with appropriate alternative as covered by patient's insurance. 100 each 11     No current facility-administered medications for this visit. Allergies   Allergen Reactions    Other Itching     apples       Objective   Vitals: Blood pressure 104/70, pulse 74, weight 66.2 kg (146 lb), SpO2 98 %. Physical Exam  Vitals reviewed. Constitutional:       General: She is not in acute distress. Appearance: Normal appearance. She is not ill-appearing. HENT:      Head: Normocephalic and atraumatic. Nose: Nose normal.   Eyes:      Extraocular Movements: Extraocular movements intact. Conjunctiva/sclera: Conjunctivae normal.   Pulmonary:      Effort: No respiratory distress. Musculoskeletal:      Cervical back: Normal range of motion. Neurological:      General: No focal deficit present. Mental Status: She is alert and oriented to person, place, and time. Psychiatric:         Mood and Affect: Mood normal.         Behavior: Behavior normal.         The history was obtained from the review of the chart, patient. Lab Results:   Lab Results   Component Value Date/Time    Hemoglobin A1C 5.2 11/10/2023 08:14 AM    Hemoglobin A1C 5.2 05/16/2023 09:37 AM    BUN 10 11/10/2023 08:14 AM    BUN 9 05/16/2023 09:37 AM    Potassium 3.8 11/10/2023 08:14 AM    Potassium 3.9 05/16/2023 09:37 AM    Chloride 106 11/10/2023 08:14 AM    Chloride 105 05/16/2023 09:37 AM    CO2 25 11/10/2023 08:14 AM    CO2 26 05/16/2023 09:37 AM    Creatinine 0.65 11/10/2023 08:14 AM    Creatinine 0.60 05/16/2023 09:37 AM           Imaging Studies: I have personally reviewed pertinent reports. Portions of the record may have been created with voice recognition software. Occasional wrong word or "sound a like" substitutions may have occurred due to the inherent limitations of voice recognition software. Read the chart carefully and recognize, using context, where substitutions have occurred.

## 2024-02-12 ENCOUNTER — OFFICE VISIT (OUTPATIENT)
Dept: FAMILY MEDICINE CLINIC | Facility: CLINIC | Age: 39
End: 2024-02-12
Payer: COMMERCIAL

## 2024-02-12 VITALS
DIASTOLIC BLOOD PRESSURE: 78 MMHG | WEIGHT: 148 LBS | SYSTOLIC BLOOD PRESSURE: 116 MMHG | HEIGHT: 61 IN | HEART RATE: 78 BPM | BODY MASS INDEX: 27.94 KG/M2 | OXYGEN SATURATION: 98 %

## 2024-02-12 DIAGNOSIS — Z13.29 SCREENING FOR THYROID DISORDER: ICD-10-CM

## 2024-02-12 DIAGNOSIS — Z12.31 ENCOUNTER FOR SCREENING MAMMOGRAM FOR MALIGNANT NEOPLASM OF BREAST: ICD-10-CM

## 2024-02-12 DIAGNOSIS — R74.01 TRANSAMINITIS: ICD-10-CM

## 2024-02-12 DIAGNOSIS — E11.9 TYPE 2 DIABETES MELLITUS WITHOUT COMPLICATION, UNSPECIFIED WHETHER LONG TERM INSULIN USE (HCC): ICD-10-CM

## 2024-02-12 DIAGNOSIS — F17.200 CURRENT SMOKER: ICD-10-CM

## 2024-02-12 DIAGNOSIS — E78.5 HYPERLIPIDEMIA LDL GOAL <70: ICD-10-CM

## 2024-02-12 DIAGNOSIS — Z00.00 ANNUAL PHYSICAL EXAM: Primary | ICD-10-CM

## 2024-02-12 DIAGNOSIS — F32.1 MODERATE MAJOR DEPRESSION (HCC): ICD-10-CM

## 2024-02-12 DIAGNOSIS — F41.9 SEVERE ANXIETY: ICD-10-CM

## 2024-02-12 DIAGNOSIS — Z28.21 INFLUENZA VACCINATION DECLINED BY PATIENT: ICD-10-CM

## 2024-02-12 DIAGNOSIS — Z13.0 SCREENING FOR DEFICIENCY ANEMIA: ICD-10-CM

## 2024-02-12 LAB
LEFT EYE DIABETIC RETINOPATHY: NORMAL
LEFT EYE IMAGE QUALITY: NORMAL
LEFT EYE MACULAR EDEMA: NORMAL
LEFT EYE OTHER RETINOPATHY: NORMAL
RIGHT EYE DIABETIC RETINOPATHY: NORMAL
RIGHT EYE IMAGE QUALITY: NORMAL
RIGHT EYE MACULAR EDEMA: NORMAL
RIGHT EYE OTHER RETINOPATHY: NORMAL
SEVERITY (EYE EXAM): NORMAL

## 2024-02-12 PROCEDURE — 99395 PREV VISIT EST AGE 18-39: CPT | Performed by: FAMILY MEDICINE

## 2024-02-12 PROCEDURE — 99214 OFFICE O/P EST MOD 30 MIN: CPT | Performed by: FAMILY MEDICINE

## 2024-02-12 RX ORDER — ROSUVASTATIN CALCIUM 10 MG/1
5 TABLET, COATED ORAL
Qty: 30 TABLET | Refills: 0 | Status: SHIPPED | OUTPATIENT
Start: 2024-02-12

## 2024-02-12 RX ORDER — ALPRAZOLAM 0.5 MG/1
0.5 TABLET ORAL
Qty: 30 TABLET | Refills: 0 | Status: SHIPPED | OUTPATIENT
Start: 2024-02-12

## 2024-02-12 RX ORDER — BUPROPION HYDROCHLORIDE 150 MG/1
150 TABLET ORAL DAILY
Qty: 30 TABLET | Refills: 2 | Status: SHIPPED | OUTPATIENT
Start: 2024-02-12

## 2024-02-12 NOTE — PROGRESS NOTES
Assessment/Plan:   Diagnoses and all orders for this visit:    Annual physical exam  - reviewed PMHx, PSHx, meds, allergies, FHx, Soc Hx and Sexual Hx   - UTD with Tdap (2016)   - UTD with COVID IMMs but no Booster   - UTD with PCV20  - declined Flu vaccine in the office today  - has script for routine labs   - additional labs given   - overdue for annual GYN exam and PAP - advised to schedule   - declined STD screening in the office today  - UTD with Dental   - discussed diet and exercise  - RTO in 1yr for annual or sooner if with abnml labs - pt aware and agreeable     Influenza vaccination declined by patient    Type 2 diabetes mellitus without complication, unspecified whether long term insulin use (HCC)  -     IRIS Diabetic eye exam  -     Albumin / creatinine urine ratio; Future  - follows with Endo - last OV was 12/2023 - management as per Specialist team   - declined annual Flu vaccine in the office today   - UTD with PCV20   - last A1c = 5.2 -- on Ozempic   - nml DM foot exam in the office today   - IRIS done in office today   - diet/exercise     Transaminitis  - CMP pending     Hyperlipidemia LDL goal <70  -     Lipid panel; Future  -     rosuvastatin (CRESTOR) 10 MG tablet; Take 0.5 tablets (5 mg total) by mouth every other day  - has not been taking her Statin   - last checked LDL was >70   - discussed medication compliance and script sent to pharmacy     Screening for thyroid disorder  - has script     Screening for deficiency anemia  - has script     Encounter for screening mammogram for malignant neoplasm of breast  -     Mammo screening bilateral w 3d & cad; Future    Moderate major depression (HCC)  -     buPROPion (Wellbutrin XL) 150 mg 24 hr tablet; Take 1 tablet (150 mg total) by mouth daily  -     Ambulatory referral to Psych Services; Future  -     ALPRAZolam (XANAX) 0.5 mg tablet; Take 1 tablet (0.5 mg total) by mouth daily at bedtime as needed for anxiety  - STELLA-7 score of 19   - PHQ-9 score  "of   - denies SI/HI  - used to follow with a Therapist but it's been some time   - has multiple children with autism at home and on waitlist for varying aid services  - has restarted smoking - 0.5PPD for the past year  - will restart on Wellbutrin as pt had tolerated well in the past and will also help with smoking cessation   - RTO in 1month for close f/u - pt aware and agreeable   Severe anxiety  -     buPROPion (Wellbutrin XL) 150 mg 24 hr tablet; Take 1 tablet (150 mg total) by mouth daily  -     Ambulatory referral to Psych Services; Future  -     ALPRAZolam (XANAX) 0.5 mg tablet; Take 1 tablet (0.5 mg total) by mouth daily at bedtime as needed for anxiety  Current smoker          Subjective:    Patient ID: Gilma Montilla is a 38 y.o. female.  Gilma Montilla is a 38 y.o. female who presents to the office for an annual exam and f/u of multiple medical conditions   1) Annual   - Specialists: Endo  - PMHx:  x3, seasonal allergies, STELLA, former smoker, DM2, HL, Vit D deficiency, h/o kidney stone, BMI 28  - allergies: NKDA  - Meds: see med rec   - PSHx: none   - FHx: M (HTN), F (anemia), S ( - leukemia), S (uterine fibroids), cousin and niece (MS), MA (thyroid disease), MGM/PGM (DM), MGM (breast ca with brain mets - dx in her 50s), children with autism, denies FHx of colon ca/cervical ca/sudden cardiac death    - Immunizations: Tdap in 2016, UTD with COVID IMMs but no Booster, UTD with PCV20, declined annual Flu vaccine   - GYN Hx: last annual and PAP was 2021 (nml, denies h/o abnml PAPs), FDLMP: 2024, gets monthly; no birth control/no condoms   - diet/exercise: trying to walk, regular diet   - social: current smoker - 0.5PPD/1yr (quit in  - 0.25PPD/21yrs), (+) EtOH (wine), \"smoking weed\", denies illicit; kids in 9/10/11th grade   - sexual Hx: active with spouse, declined STD screening   - last vision: no glasses or contacts; overdue for Optho    - last dental: 2months ago   - ROS: today in " "the office pt denies F/C/N/V/HA/visual changes/CP/palpitations/SOB/wheezing/abd pain/D/LE edema  2) DM, HL, myalgia, STELLA   - reviewed labs done 11/2023   - STELLA-7 score of 19   - PHQ-9 score of 11  - denies SI/HI  - used to follow with a Therapist but it's been some time   - denies Pmhx of seizures or eating disorder         The following portions of the patient's history were reviewed and updated as appropriate: allergies, current medications, past family history, past medical history, past social history, past surgical history and problem list.    Review of Systems  as per HPI    Objective:  /78   Pulse 78   Ht 5' 1\" (1.549 m)   Wt 67.1 kg (148 lb)   SpO2 98%   BMI 27.96 kg/m²    Physical Exam  Vitals reviewed.   Constitutional:       General: She is not in acute distress.     Appearance: Normal appearance. She is not ill-appearing, toxic-appearing or diaphoretic.   HENT:      Head: Normocephalic and atraumatic.      Right Ear: External ear normal.      Left Ear: External ear normal.      Nose: Nose normal.   Eyes:      General: No scleral icterus.        Right eye: No discharge.         Left eye: No discharge.      Extraocular Movements: Extraocular movements intact.      Conjunctiva/sclera: Conjunctivae normal.   Cardiovascular:      Rate and Rhythm: Normal rate and regular rhythm.      Pulses: no weak pulses          Dorsalis pedis pulses are 2+ on the right side and 2+ on the left side.      Heart sounds: Normal heart sounds.   Pulmonary:      Effort: Pulmonary effort is normal. No respiratory distress.      Breath sounds: Normal breath sounds. No stridor. No wheezing, rhonchi or rales.   Abdominal:      Palpations: Abdomen is soft.   Musculoskeletal:         General: Normal range of motion.      Cervical back: Normal range of motion.      Right lower leg: No edema.      Left lower leg: No edema.   Feet:      Right foot:      Skin integrity: No ulcer, skin breakdown, erythema, warmth, callus or dry " skin.      Left foot:      Skin integrity: No ulcer, skin breakdown, erythema, warmth, callus or dry skin.   Skin:     General: Skin is warm.   Neurological:      General: No focal deficit present.      Mental Status: She is alert and oriented to person, place, and time.   Psychiatric:         Attention and Perception: Attention normal.         Mood and Affect: Affect normal. Mood is anxious and depressed.         Speech: Speech normal. She is communicative. Speech is not rapid and pressured.         Behavior: Behavior normal. Behavior is cooperative.         Thought Content: Thought content normal. Thought content does not include homicidal or suicidal ideation. Thought content does not include homicidal or suicidal plan.         Cognition and Memory: Cognition normal.         Judgment: Judgment normal.      Comments: PHQ-9 score of 11, denies SI/HI, STELLA-7 score of 19            Patient's shoes and socks removed.    Right Foot/Ankle   Right Foot Inspection  Skin Exam: skin normal and skin intact. No dry skin, no warmth, no callus, no erythema, no maceration, no abnormal color, no pre-ulcer, no ulcer and no callus.     Toe Exam: ROM and strength within normal limits and tenderness. No swelling, erythema and  no right toe deformity    Sensory   Monofilament testing: intact    Vascular  The right DP pulse is 2+.     Left Foot/Ankle  Left Foot Inspection  Skin Exam: skin normal and skin intact. No dry skin, no warmth, no erythema, no maceration, normal color, no pre-ulcer, no ulcer and no callus.     Toe Exam: ROM and strength within normal limits. No swelling, no tenderness, no erythema and no left toe deformity.     Sensory   Monofilament testing: intact    Vascular  The left DP pulse is 2+.     Assign Risk Category  No deformity present  No loss of protective sensation  No weak pulses  Risk: 0    Depression Screening Follow-up Plan: Patient's depression screening was positive with a PHQ-2 score of . Their PHQ-9  score was 11. Patient assessed for underlying major depression. They have no active suicidal ideations. Brief counseling provided and recommend additional follow-up/re-evaluation next office visit. Continue regular follow-up with their psychologist/therapist/psychiatrist who is managing their mental health condition(s). Patient advised to follow-up with PCP for further management.      BMI Counseling: Body mass index is 27.96 kg/m². The BMI is above normal. Nutrition recommendations include 3-5 servings of fruits/vegetables daily. Exercise recommendations include exercising 3-5 times per week.

## 2024-02-12 NOTE — PROGRESS NOTES
Assessment/Plan:   Diagnoses and all orders for this visit:    Influenza vaccination declined by patient  Type 2 diabetes mellitus without complication, unspecified whether long term insulin use (HCC)  -     IRIS Diabetic eye exam  -     Albumin / creatinine urine ratio; Future  - follows with Endo - last OV was 12/2023 - management as per Specialist team   - declined annual Flu vaccine in the office today   - UTD with PCV20   - last A1c = 5.2 -- on Ozempic   - nml DM foot exam in the office today   - IRIS done in office today   - diet/exercise     Transaminitis  - CMP pending     Hyperlipidemia LDL goal <70  -     Lipid panel; Future  -     rosuvastatin (CRESTOR) 10 MG tablet; Take 0.5 tablets (5 mg total) by mouth every other day  - has not been taking her Statin   - last checked LDL was >70   - discussed medication compliance and script sent to pharmacy     Moderate major depression (HCC)  -     buPROPion (Wellbutrin XL) 150 mg 24 hr tablet; Take 1 tablet (150 mg total) by mouth daily  -     Ambulatory referral to Psych Services; Future  -     ALPRAZolam (XANAX) 0.5 mg tablet; Take 1 tablet (0.5 mg total) by mouth daily at bedtime as needed for anxiety  - STELLA-7 score of 19   - PHQ-9 score of 11  - denies SI/HI  - used to follow with a Therapist but it's been some time   - has multiple children with autism at home and on waitlist for varying aid services  - has restarted smoking - 0.5PPD for the past year  - will restart on Wellbutrin as pt had tolerated well in the past and will also help with smoking cessation   - RTO in 1month for close f/u - pt aware and agreeable   Severe anxiety  -     buPROPion (Wellbutrin XL) 150 mg 24 hr tablet; Take 1 tablet (150 mg total) by mouth daily  -     Ambulatory referral to Psych Services; Future  -     ALPRAZolam (XANAX) 0.5 mg tablet; Take 1 tablet (0.5 mg total) by mouth daily at bedtime as needed for anxiety  Current smoker          Subjective:    Patient ID: Sameerah  "Roldan is a 38 y.o. female.  Gilma Montilla is a 38 y.o. female who presents to the office for an annual exam and f/u of multiple medical conditions   1) Annual   - Specialists: Endo  - PMHx:  x3, seasonal allergies, STELLA, former smoker, DM2, HL, Vit D deficiency, h/o kidney stone, BMI 28  - allergies: NKDA  - Meds: see med rec   - PSHx: none   - FHx: M (HTN), F (anemia), S ( - leukemia), S (uterine fibroids), cousin and niece (MS), MA (thyroid disease), MGM/PGM (DM), MGM (breast ca with brain mets - dx in her 50s), children with autism, denies FHx of colon ca/cervical ca/sudden cardiac death    - Immunizations: Tdap in 2016, UTD with COVID IMMs but no Booster, UTD with PCV20, declined annual Flu vaccine   - GYN Hx: last annual and PAP was 2021 (nml, denies h/o abnml PAPs), FDLMP: 2024, gets monthly; no birth control/no condoms   - diet/exercise: trying to walk, regular diet   - social: current smoker - 0.5PPD/1yr (quit in  - 0.25PPD/21yrs), (+) EtOH (wine), \"smoking weed\", denies illicit; kids in 9/10/11th grade   - sexual Hx: active with spouse, declined STD screening   - last vision: no glasses or contacts; overdue for Optho    - last dental: 2months ago   - ROS: today in the office pt denies F/C/N/V/HA/visual changes/CP/palpitations/SOB/wheezing/abd pain/D/LE edema  2) DM, HL, myalgia, STELLA   - reviewed labs done 2023   - STELLA-7 score of 19   - PHQ-9 score of 11  - denies SI/HI  - used to follow with a Therapist but it's been some time   - denies Pmhx of seizures or eating disorder         The following portions of the patient's history were reviewed and updated as appropriate: allergies, current medications, past family history, past medical history, past social history, past surgical history and problem list.    Review of Systems  as per HPI    Objective:  /78   Pulse 78   Ht 5' 1\" (1.549 m)   Wt 67.1 kg (148 lb)   SpO2 98%   BMI 27.96 kg/m²    Physical Exam  Vitals reviewed. "   Constitutional:       General: She is not in acute distress.     Appearance: Normal appearance. She is not ill-appearing, toxic-appearing or diaphoretic.   HENT:      Head: Normocephalic and atraumatic.      Right Ear: External ear normal.      Left Ear: External ear normal.      Nose: Nose normal.   Eyes:      General: No scleral icterus.        Right eye: No discharge.         Left eye: No discharge.      Extraocular Movements: Extraocular movements intact.      Conjunctiva/sclera: Conjunctivae normal.   Cardiovascular:      Rate and Rhythm: Normal rate and regular rhythm.      Pulses: no weak pulses          Dorsalis pedis pulses are 2+ on the right side and 2+ on the left side.      Heart sounds: Normal heart sounds.   Pulmonary:      Effort: Pulmonary effort is normal. No respiratory distress.      Breath sounds: Normal breath sounds. No stridor. No wheezing, rhonchi or rales.   Abdominal:      Palpations: Abdomen is soft.   Musculoskeletal:         General: Normal range of motion.      Cervical back: Normal range of motion.      Right lower leg: No edema.      Left lower leg: No edema.   Feet:      Right foot:      Skin integrity: No ulcer, skin breakdown, erythema, warmth, callus or dry skin.      Left foot:      Skin integrity: No ulcer, skin breakdown, erythema, warmth, callus or dry skin.   Skin:     General: Skin is warm.   Neurological:      General: No focal deficit present.      Mental Status: She is alert and oriented to person, place, and time.   Psychiatric:         Attention and Perception: Attention normal.         Mood and Affect: Affect normal. Mood is anxious and depressed.         Speech: Speech normal. She is communicative. Speech is not rapid and pressured.         Behavior: Behavior normal. Behavior is cooperative.         Thought Content: Thought content normal. Thought content does not include homicidal or suicidal ideation. Thought content does not include homicidal or suicidal plan.          Cognition and Memory: Cognition normal.         Judgment: Judgment normal.      Comments: PHQ-9 score of 11, denies SI/HI, STELLA-7 score of 19            Patient's shoes and socks removed.    Right Foot/Ankle   Right Foot Inspection  Skin Exam: skin normal and skin intact. No dry skin, no warmth, no callus, no erythema, no maceration, no abnormal color, no pre-ulcer, no ulcer and no callus.     Toe Exam: ROM and strength within normal limits and tenderness. No swelling, erythema and  no right toe deformity    Sensory   Monofilament testing: intact    Vascular  The right DP pulse is 2+.     Left Foot/Ankle  Left Foot Inspection  Skin Exam: skin normal and skin intact. No dry skin, no warmth, no erythema, no maceration, normal color, no pre-ulcer, no ulcer and no callus.     Toe Exam: ROM and strength within normal limits. No swelling, no tenderness, no erythema and no left toe deformity.     Sensory   Monofilament testing: intact    Vascular  The left DP pulse is 2+.     Assign Risk Category  No deformity present  No loss of protective sensation  No weak pulses  Risk: 0    Depression Screening Follow-up Plan: Patient's depression screening was positive with a PHQ-2 score of . Their PHQ-9 score was 11. Patient assessed for underlying major depression. They have no active suicidal ideations. Brief counseling provided and recommend additional follow-up/re-evaluation next office visit. Continue regular follow-up with their psychologist/therapist/psychiatrist who is managing their mental health condition(s). Patient advised to follow-up with PCP for further management.      BMI Counseling: Body mass index is 27.96 kg/m². The BMI is above normal. Nutrition recommendations include 3-5 servings of fruits/vegetables daily. Exercise recommendations include exercising 3-5 times per week.

## 2024-02-18 DIAGNOSIS — Z79.4 TYPE 2 DIABETES MELLITUS WITH HYPERGLYCEMIA, WITH LONG-TERM CURRENT USE OF INSULIN (HCC): ICD-10-CM

## 2024-02-18 DIAGNOSIS — E11.65 TYPE 2 DIABETES MELLITUS WITH HYPERGLYCEMIA, WITH LONG-TERM CURRENT USE OF INSULIN (HCC): ICD-10-CM

## 2024-02-19 RX ORDER — SEMAGLUTIDE 2.68 MG/ML
INJECTION, SOLUTION SUBCUTANEOUS
Qty: 9 ML | Refills: 0 | Status: SHIPPED | OUTPATIENT
Start: 2024-02-19

## 2024-02-27 ENCOUNTER — TELEPHONE (OUTPATIENT)
Dept: PSYCHIATRY | Facility: CLINIC | Age: 39
End: 2024-02-27

## 2024-02-27 NOTE — TELEPHONE ENCOUNTER
Contacted patient in regards to Routine Referral in attempts to verify patient's needs of services and add patient to proper wait list. spoke with patient whom stated she is interested in Talk Therapy and Med Managent with next available provider. Pt placed in proper wait list. Closing referral.

## 2024-03-22 DIAGNOSIS — Z79.4 TYPE 2 DIABETES MELLITUS WITH HYPERGLYCEMIA, WITH LONG-TERM CURRENT USE OF INSULIN (HCC): ICD-10-CM

## 2024-03-22 DIAGNOSIS — E11.65 TYPE 2 DIABETES MELLITUS WITH HYPERGLYCEMIA, WITH LONG-TERM CURRENT USE OF INSULIN (HCC): ICD-10-CM

## 2024-03-22 RX ORDER — SEMAGLUTIDE 2.68 MG/ML
INJECTION, SOLUTION SUBCUTANEOUS
Qty: 9 ML | Refills: 0 | Status: SHIPPED | OUTPATIENT
Start: 2024-03-22

## 2024-04-05 ENCOUNTER — APPOINTMENT (OUTPATIENT)
Dept: LAB | Facility: CLINIC | Age: 39
End: 2024-04-05
Payer: COMMERCIAL

## 2024-04-05 DIAGNOSIS — E78.5 HYPERLIPIDEMIA LDL GOAL <70: ICD-10-CM

## 2024-04-05 DIAGNOSIS — E11.9 TYPE 2 DIABETES MELLITUS WITHOUT COMPLICATION, UNSPECIFIED WHETHER LONG TERM INSULIN USE (HCC): ICD-10-CM

## 2024-04-05 LAB
CHOLEST SERPL-MCNC: 249 MG/DL
CREAT UR-MCNC: 195.2 MG/DL
HDLC SERPL-MCNC: 83 MG/DL
LDLC SERPL CALC-MCNC: 146 MG/DL (ref 0–100)
MICROALBUMIN UR-MCNC: 11.8 MG/L
MICROALBUMIN/CREAT 24H UR: 6 MG/G CREATININE (ref 0–30)
NONHDLC SERPL-MCNC: 166 MG/DL
TRIGL SERPL-MCNC: 100 MG/DL

## 2024-04-05 PROCEDURE — 82043 UR ALBUMIN QUANTITATIVE: CPT

## 2024-04-05 PROCEDURE — 82570 ASSAY OF URINE CREATININE: CPT

## 2024-04-05 PROCEDURE — 36415 COLL VENOUS BLD VENIPUNCTURE: CPT

## 2024-04-05 PROCEDURE — 80061 LIPID PANEL: CPT

## 2024-04-11 ENCOUNTER — OFFICE VISIT (OUTPATIENT)
Dept: FAMILY MEDICINE CLINIC | Facility: CLINIC | Age: 39
End: 2024-04-11

## 2024-04-11 VITALS
SYSTOLIC BLOOD PRESSURE: 112 MMHG | DIASTOLIC BLOOD PRESSURE: 70 MMHG | OXYGEN SATURATION: 99 % | HEIGHT: 61 IN | HEART RATE: 69 BPM | BODY MASS INDEX: 27.56 KG/M2 | WEIGHT: 146 LBS

## 2024-04-11 DIAGNOSIS — E78.5 HYPERLIPIDEMIA LDL GOAL <70: ICD-10-CM

## 2024-04-11 DIAGNOSIS — F41.9 MODERATE ANXIETY: ICD-10-CM

## 2024-04-11 DIAGNOSIS — F17.200 CURRENT SMOKER: ICD-10-CM

## 2024-04-11 DIAGNOSIS — E11.9 TYPE 2 DIABETES MELLITUS WITHOUT COMPLICATION, UNSPECIFIED WHETHER LONG TERM INSULIN USE (HCC): ICD-10-CM

## 2024-04-11 DIAGNOSIS — G47.00 INSOMNIA, UNSPECIFIED TYPE: ICD-10-CM

## 2024-04-11 DIAGNOSIS — F32.0 MILD MAJOR DEPRESSION (HCC): Primary | ICD-10-CM

## 2024-04-11 RX ORDER — ROSUVASTATIN CALCIUM 10 MG/1
10 TABLET, COATED ORAL
Qty: 90 TABLET | Refills: 0 | Status: SHIPPED | OUTPATIENT
Start: 2024-04-11

## 2024-04-11 RX ORDER — EZETIMIBE 10 MG/1
10 TABLET ORAL DAILY
Qty: 90 TABLET | Refills: 0 | Status: SHIPPED | OUTPATIENT
Start: 2024-04-11

## 2024-04-11 RX ORDER — ZOLPIDEM TARTRATE 5 MG/1
5 TABLET ORAL
Qty: 30 TABLET | Refills: 0 | Status: SHIPPED | OUTPATIENT
Start: 2024-04-11

## 2024-04-11 NOTE — PROGRESS NOTES
Assessment/Plan:   Diagnoses and all orders for this visit:    Mild major depression (HCC)  Moderate anxiety  Current smoker  - currently smoking 0.5PPD   - was started on Wellbutrin XL 150mg QD for moderate major depression, severe anxiety and smoking cessation -- cont for now at current dose   - STELLA-7 score of 12 <-- 19   - PHQ-9 score of 5 <-- 11  - denies SI/HI  - currently on 2 waitlists for Therapist -- referred to [facility] and [facility]   - denies PMHx of seizures or eating disorder   - (+) has not been able to sleep for the past 3days -- finally has gotten an aide of 1 of her children but no o/n help and pt's daughter has been known to elope   - will do trial of Ambien 5mg QHS PRN   - RTO in 1month for close f/u - pt aware and agreeable   Insomnia, unspecified type  -     zolpidem (AMBIEN) 5 mg tablet; Take 1 tablet (5 mg total) by mouth daily at bedtime as needed for sleep    Type 2 diabetes mellitus without complication, unspecified whether long term insulin use (HCC)  - labs pending   - follows with Endo     Hyperlipidemia LDL goal <70  -     rosuvastatin (CRESTOR) 10 MG tablet; Take 1 tablet (10 mg total) by mouth daily at bedtime  -     ezetimibe (ZETIA) 10 mg tablet; Take 1 tablet (10 mg total) by mouth daily  -     Lipid panel; Future  - (+) LDL = 146 (goal given h/o DM = less than 70)   - has been taking Crestor 10mg Q48hrs - denies myalgias  - advised to increase to Crestor 10mg QHS and add Zetia 10mg QD   - repeat labs in 3months           Subjective:    Patient ID: Gilma Montilla is a 39 y.o. female.  HPI  38yo F presents to the office for f/u of DM, HL, myalgia, STELLA   - reviewed labs done 4/5/2024   - currently smoking 0.5PPD   - was started on Wellbutrin XL 150mg QD for moderate major depression, severe anxiety and smoking cessation   - STELLA-7 score of 12 <-- 19   - PHQ-9 score of 5 <-- 11  - denies SI/HI  - currently on 2 waitlists for Therapist   - denies PMHx of seizures or eating disorder   - (+)  "has not been able to sleep for the past 3days -- finally has gotten an aide of 1 of her children but no o/n help and pt's daughter has been known to charlene   - (+) LDL = 146 (goal given h/o DM = less than 70)   - has been taking Crestor 10mg Q48hrs - denies myalgias        The following portions of the patient's history were reviewed and updated as appropriate: allergies, current medications, past family history, past medical history, past social history, past surgical history and problem list.    Review of Systems  as per HPI    Objective:  /70   Pulse 69   Ht 5' 1\" (1.549 m)   Wt 66.2 kg (146 lb)   SpO2 99%   BMI 27.59 kg/m²    Physical Exam  Vitals reviewed.   Constitutional:       General: She is not in acute distress.     Appearance: Normal appearance. She is not ill-appearing, toxic-appearing or diaphoretic.   HENT:      Head: Normocephalic and atraumatic.      Right Ear: External ear normal.      Left Ear: External ear normal.      Nose: Nose normal.   Eyes:      General: No scleral icterus.        Right eye: No discharge.         Left eye: No discharge.      Extraocular Movements: Extraocular movements intact.      Conjunctiva/sclera: Conjunctivae normal.   Pulmonary:      Effort: Pulmonary effort is normal.   Musculoskeletal:         General: Normal range of motion.      Cervical back: Normal range of motion.   Neurological:      General: No focal deficit present.      Mental Status: She is alert and oriented to person, place, and time.   Psychiatric:         Attention and Perception: Attention normal.         Mood and Affect: Affect normal. Mood is anxious. Mood is not depressed.         Speech: Speech normal. She is communicative. Speech is not rapid and pressured.         Behavior: Behavior normal. Behavior is cooperative.         Thought Content: Thought content normal. Thought content does not include homicidal or suicidal ideation. Thought content does not include homicidal or suicidal plan. "         Cognition and Memory: Cognition normal.         Judgment: Judgment normal.         Depression Screening Follow-up Plan: Patient's depression screening was positive with a PHQ-2 score of . Their PHQ-9 score was 5. Patient assessed for underlying major depression. They have no active suicidal ideations. Brief counseling provided and recommend additional follow-up/re-evaluation next office visit. Continue regular follow-up with their psychologist/therapist/psychiatrist who is managing their mental health condition(s). Patient advised to follow-up with PCP for further management.

## 2024-04-26 ENCOUNTER — HOSPITAL ENCOUNTER (OUTPATIENT)
Facility: HOSPITAL | Age: 39
End: 2024-04-26
Payer: COMMERCIAL

## 2024-04-26 VITALS — BODY MASS INDEX: 27.19 KG/M2 | WEIGHT: 144 LBS | HEIGHT: 61 IN

## 2024-04-26 DIAGNOSIS — Z12.31 ENCOUNTER FOR SCREENING MAMMOGRAM FOR MALIGNANT NEOPLASM OF BREAST: ICD-10-CM

## 2024-04-26 PROCEDURE — 77067 SCR MAMMO BI INCL CAD: CPT

## 2024-04-26 PROCEDURE — 77063 BREAST TOMOSYNTHESIS BI: CPT

## 2024-06-11 ENCOUNTER — LAB (OUTPATIENT)
Dept: LAB | Facility: CLINIC | Age: 39
End: 2024-06-11
Payer: COMMERCIAL

## 2024-06-11 DIAGNOSIS — E78.5 HYPERLIPIDEMIA LDL GOAL <70: ICD-10-CM

## 2024-06-11 DIAGNOSIS — E11.65 TYPE 2 DIABETES MELLITUS WITH HYPERGLYCEMIA, WITH LONG-TERM CURRENT USE OF INSULIN (HCC): ICD-10-CM

## 2024-06-11 DIAGNOSIS — Z13.29 THYROID DISORDER SCREENING: ICD-10-CM

## 2024-06-11 DIAGNOSIS — E55.9 VITAMIN D DEFICIENCY: ICD-10-CM

## 2024-06-11 DIAGNOSIS — Z79.4 TYPE 2 DIABETES MELLITUS WITH HYPERGLYCEMIA, WITH LONG-TERM CURRENT USE OF INSULIN (HCC): ICD-10-CM

## 2024-06-11 LAB
25(OH)D3 SERPL-MCNC: 30 NG/ML (ref 30–100)
ALBUMIN SERPL BCP-MCNC: 4.1 G/DL (ref 3.5–5)
ALP SERPL-CCNC: 111 U/L (ref 34–104)
ALT SERPL W P-5'-P-CCNC: 13 U/L (ref 7–52)
ANION GAP SERPL CALCULATED.3IONS-SCNC: 8 MMOL/L (ref 4–13)
AST SERPL W P-5'-P-CCNC: 18 U/L (ref 13–39)
BILIRUB SERPL-MCNC: 0.79 MG/DL (ref 0.2–1)
BUN SERPL-MCNC: 8 MG/DL (ref 5–25)
CALCIUM SERPL-MCNC: 9.3 MG/DL (ref 8.4–10.2)
CHLORIDE SERPL-SCNC: 104 MMOL/L (ref 96–108)
CHOLEST SERPL-MCNC: 250 MG/DL
CO2 SERPL-SCNC: 26 MMOL/L (ref 21–32)
CREAT SERPL-MCNC: 0.63 MG/DL (ref 0.6–1.3)
CREAT UR-MCNC: 397 MG/DL
EST. AVERAGE GLUCOSE BLD GHB EST-MCNC: 97 MG/DL
GFR SERPL CREATININE-BSD FRML MDRD: 112 ML/MIN/1.73SQ M
GLUCOSE P FAST SERPL-MCNC: 77 MG/DL (ref 65–99)
HBA1C MFR BLD: 5 %
HDLC SERPL-MCNC: 72 MG/DL
LDLC SERPL CALC-MCNC: 163 MG/DL (ref 0–100)
MICROALBUMIN UR-MCNC: 94.5 MG/L
MICROALBUMIN/CREAT 24H UR: 24 MG/G CREATININE (ref 0–30)
NONHDLC SERPL-MCNC: 178 MG/DL
POTASSIUM SERPL-SCNC: 3.4 MMOL/L (ref 3.5–5.3)
PROT SERPL-MCNC: 7.5 G/DL (ref 6.4–8.4)
SODIUM SERPL-SCNC: 138 MMOL/L (ref 135–147)
T4 FREE SERPL-MCNC: 0.98 NG/DL (ref 0.61–1.12)
TRIGL SERPL-MCNC: 74 MG/DL
TSH SERPL DL<=0.05 MIU/L-ACNC: 1.73 UIU/ML (ref 0.45–4.5)

## 2024-06-11 PROCEDURE — 80061 LIPID PANEL: CPT

## 2024-06-11 PROCEDURE — 84443 ASSAY THYROID STIM HORMONE: CPT

## 2024-06-11 PROCEDURE — 82570 ASSAY OF URINE CREATININE: CPT

## 2024-06-11 PROCEDURE — 82043 UR ALBUMIN QUANTITATIVE: CPT

## 2024-06-11 PROCEDURE — 84439 ASSAY OF FREE THYROXINE: CPT

## 2024-06-11 PROCEDURE — 36415 COLL VENOUS BLD VENIPUNCTURE: CPT

## 2024-06-11 PROCEDURE — 83036 HEMOGLOBIN GLYCOSYLATED A1C: CPT

## 2024-06-11 PROCEDURE — 82306 VITAMIN D 25 HYDROXY: CPT

## 2024-06-11 PROCEDURE — 80053 COMPREHEN METABOLIC PANEL: CPT

## 2024-06-11 NOTE — PATIENT INSTRUCTIONS
Hypoglycemia instructions   Gilma Montilla  6/11/2024  [id number]    Low Blood Sugar    Steps to treat low blood sugar.    1. Test blood sugar if you have symptoms of low blood sugar:   Low Blood Sugar Symptoms:  o Sweaty  o Dizzy  o Rapid heartbeat  o Shaky  o Bad mood  o Hungry      2. Treat blood sugar less than 70 with 15 grams of fast-acting carbohydrate:   Examples of 15 grams Fast-Acting Carbohydrate:  o 4 oz juice  o 4 oz regular soda  o 3-4 glucose tablets (chew)  o 3-4 hard candies (chew)          3.  Wait 15 minutes and test your blood sugar again     4. If blood sugar is less than 100, repeat steps 2-3.    5. When your blood sugar is 100 or more, eat a snack if it will be longer than one hour until your next meal. The snack should be 15 grams of carbohydrate and a protein:   Examples of snacks:  o ½ sandwich  o 6 crackers with cheese  o Piece of fruit with cheese or peanut butter  o 6 crackers with peanut butter

## 2024-06-11 NOTE — PROGRESS NOTES
Patient Progress Note      CC: DM      Referring Provider  Lacey England Do  [address] [place] [address]     History of Present Illness:   Gilma Montilla is a 39 y.o. female with a history of type 2 diabetes with long term use of insulin. Diabetes course has been stable. Diagnosed in April 2021 at which time A1C was 11.7%. Denies recent severe hypoglycemic or severe hyperglycemic episodes. Denies any issues with her current regimen. Home glucose monitoring: are not performed      Current regimen: Ozempic 2 mg weekly  compliant most of the time, denies any side effects from medications.  Injects in: abdomen. Rotates sites: Yes  Hypoglycemic episodes: No, rare  H/o of hypoglycemia causing hospitalization or Intervention such as glucagon injection or ambulance call : No  Hypoglycemia symptoms: jitteriness and sweating  Treatment of hypoglycemia: orange juice     Medic alert tag: recommended: Yes     Diabetes education: Yes  Diet: 2 meals per day, 0-1 snack per day. Timing of meals is predictable.   Diabetic diet compliance: compliant some of the time  Activity: Daily activity is predictable: Yes. She exercises daily.      Ophthamology: Feb 2024  Podiatry: Feb 2024     Not on ACE inhibitor/ARB  Has hyperlipidemia: on statin - not taking it, she never started it.  Thyroid disorders: No  History of pancreatitis: No     Vitamin D deficiency: she is not taking vitamin D3        Patient Active Problem List   Diagnosis    Dependence on nicotine from cigarettes    STELLA (generalized anxiety disorder)    Panic attacks    Obesity, Class I, BMI 30-34.9    Anxiety with depression    Elevated alkaline phosphatase level    Current every day smoker    GERD (gastroesophageal reflux disease)    Hydronephrosis with ureteropelvic junction (UPJ) obstruction    Kidney stone on left side    Ureteral calculus, left    Left ureteral stone    Anovulation    Type 2 diabetes mellitus with hyperglycemia, without long-term  current use of insulin (HCC)    Mixed hyperlipidemia    Vitamin D deficiency    Overweight (BMI 25.0-29.9)      Past Medical History:   Diagnosis Date    Acid reflux     Allergic     seasonal    Anxiety     Blurred vision     Diabetes mellitus (HCC)     Fainting     Headache     Irregular heart beat     Palpitations    Loss of appetite     Obesity     Spontaneous vaginal delivery     x3    Weakness       Past Surgical History:   Procedure Laterality Date    FL RETROGRADE PYELOGRAM  1/4/2020    FL RETROGRADE PYELOGRAM  1/22/2020    NO PAST SURGERIES      ME CYSTO/URETERO W/LITHOTRIPSY &INDWELL STENT INSRT Left 1/4/2020    Procedure: CYSTOSCOPY URETEROSCOPY , RETROGRADE PYELOGRAM AND INSERTION STENT URETERAL;  Surgeon: Tyler Atwood MD;  [facility];  Service: Urology    ME CYSTO/URETERO W/LITHOTRIPSY &INDWELL STENT INSRT Left 1/22/2020    Procedure: CYSTOSCOPY URETEROSCOPY WITH LITHOTRIPSY HOLMIUM LASER, RETROGRADE PYELOGRAM AND INSERTION STENT URETERAL;  Surgeon: Sarthak Tay MD;  [facility];  Service: Urology      Family History   Problem Relation Age of Onset    Hypertension Mother     Anemia Father     Leukemia Sister     Fibroids Sister     No Known Problems Sister     No Known Problems Sister     Breast cancer Maternal Grandmother 45    Diabetes Maternal Grandmother     Diabetes Paternal Grandmother     No Known Problems Brother     Multiple sclerosis Cousin     Thyroid disease Other     Autism Other     Colon cancer Neg Hx     Cervical cancer Neg Hx     Stroke Neg Hx      Social History     Tobacco Use    Smoking status: Some Days     Current packs/day: 0.25     Average packs/day: 0.3 packs/day for 20.0 years (5.0 ttl pk-yrs)     Types: Cigarettes    Smokeless tobacco: Never    Tobacco comments:     currently 7cigs/day   Substance Use Topics    Alcohol use: Yes     Comment: monthly     Allergies   Allergen Reactions    Other Itching     apples         Current Outpatient  "Medications:     ALPRAZolam (XANAX) 0.5 mg tablet, Take 1 tablet (0.5 mg total) by mouth daily at bedtime as needed for anxiety, Disp: 30 tablet, Rfl: 0    buPROPion (Wellbutrin XL) 150 mg 24 hr tablet, Take 1 tablet (150 mg total) by mouth daily, Disp: 30 tablet, Rfl: 2    ezetimibe (ZETIA) 10 mg tablet, Take 1 tablet (10 mg total) by mouth daily, Disp: 90 tablet, Rfl: 0    rosuvastatin (CRESTOR) 10 MG tablet, Take 1 tablet (10 mg total) by mouth daily at bedtime, Disp: 90 tablet, Rfl: 0    semaglutide, 2 mg/dose, (Ozempic, 2 MG/DOSE,) 8 mg/ mL injection pen, Inject 0.75 mL (2 mg total) under the skin every 7 days, Disp: 9 mL, Rfl: 1    zolpidem (AMBIEN) 5 mg tablet, Take 1 tablet (5 mg total) by mouth daily at bedtime as needed for sleep, Disp: 30 tablet, Rfl: 0  Review of Systems   Constitutional:  Negative for activity change, appetite change, fatigue and unexpected weight change.   HENT:  Negative for trouble swallowing.    Eyes:  Positive for visual disturbance (will schedule appointment with eye doctor).   Respiratory:  Negative for shortness of breath.    Cardiovascular:  Negative for chest pain and palpitations.   Gastrointestinal:  Negative for constipation and diarrhea.   Endocrine: Positive for polydipsia (dry mouth). Negative for polyuria.   Musculoskeletal: Negative.    Skin:  Negative for wound.   Neurological:  Positive for numbness (middle toe b/l).   Psychiatric/Behavioral: Negative.         Physical Exam:  Body mass index is 26.34 kg/m².  /84   Pulse 82   Ht 5' 1\" (1.549 m)   Wt 63.2 kg (139 lb 6.4 oz)   SpO2 99%   BMI 26.34 kg/m²    Wt Readings from Last 3 Encounters:   06/12/24 63.2 kg (139 lb 6.4 oz)   04/26/24 65.3 kg (144 lb)   04/11/24 66.2 kg (146 lb)       Physical Exam  Vitals and nursing note reviewed.   Constitutional:       Appearance: She is well-developed.   HENT:      Head: Normocephalic.   Eyes:      General: No scleral icterus.     Extraocular Movements: EOM normal.     "  Pupils: Pupils are equal, round, and reactive to light.   Neck:      Thyroid: No thyromegaly.   Cardiovascular:      Rate and Rhythm: Normal rate and regular rhythm.      Pulses:           Radial pulses are 2+ on the right side and 2+ on the left side.      Heart sounds: No murmur heard.  Pulmonary:      Effort: Pulmonary effort is normal. No respiratory distress.      Breath sounds: Normal breath sounds. No wheezing.   Musculoskeletal:      Cervical back: Neck supple.   Skin:     General: Skin is warm and dry.   Neurological:      Mental Status: She is alert.   Psychiatric:         Mood and Affect: Mood and affect normal.       Patient's shoes and socks were not removed.          Labs:   Component      Latest Ref Rng 11/10/2023 4/5/2024 6/11/2024   Sodium      135 - 147 mmol/L 137   138    Potassium      3.5 - 5.3 mmol/L 3.8   3.4 (L)    Chloride      96 - 108 mmol/L 106   104    Carbon Dioxide      21 - 32 mmol/L 25   26    ANION GAP      4 - 13 mmol/L 6   8    BUN      5 - 25 mg/dL 10   8    Creatinine      0.60 - 1.30 mg/dL 0.65   0.63    GLUCOSE, FASTING      65 - 99 mg/dL 83   77    Calcium      8.4 - 10.2 mg/dL 9.0   9.3    AST      13 - 39 U/L   18    ALT      7 - 52 U/L   13    ALK PHOS      34 - 104 U/L   111 (H)    Total Protein      6.4 - 8.4 g/dL   7.5    Albumin      3.5 - 5.0 g/dL   4.1    Total Bilirubin      0.20 - 1.00 mg/dL   0.79    GFR, Calculated      ml/min/1.73sq m 112   112    Cholesterol      See Comment mg/dL  249 (H)  250 (H)    Triglycerides      See Comment mg/dL  100  74    HDL      >=50 mg/dL  83  72    LDL Calculated      0 - 100 mg/dL  146 (H)  163 (H)    Non-HDL Cholesterol      mg/dl  166  178    EXT Creatinine Urine      Reference range not established. mg/dL  195.2  397.0    Albumin,U,Random      <20.0 mg/L  11.8  94.5 (H)    Albumin Creat Ratio      0 - 30 mg/g creatinine  6  24    Hemoglobin A1C      Normal 4.0-5.6%; PreDiabetic 5.7-6.4%; Diabetic >=6.5%; Glycemic control for  adults with diabetes <7.0% % 5.2   5.0    eAG, EST AVG Glucose      mg/dl 103   97    VITAMIN D, 25-HYDROXY      30.0 - 100.0 ng/mL 27.1 (L)   30.0    FREE T4      0.61 - 1.12 ng/dL   0.98    TSH 3RD GENERATON      0.450 - 4.500 uIU/mL   1.727       Legend:  (L) Low  (H) High      Plan:    Diagnoses and all orders for this visit:    Type 2 diabetes mellitus with hyperglycemia, without long-term current use of insulin ([facility])  HGA1C 5.0%. Improved.  Treatment regimen: continue current treatment   Episodes of hypoglycemia can lead to permanent disability and death.  Discussed risks/complications associated with uncontrolled diabetes.    Advised to adhere to diabetic diet, and recommended staying active/exercising routinely as tolerated.  Keep carbohydrates consistent to limit blood glucose fluctuations.  Advised to call if blood sugars less than 70 mg/dl or over 300 mg/dl.   Check blood glucose 1 time a day  Discussed symptoms and treatment of hypoglycemia.   Recommended routine follow-up with podiatry and ophthalmology.   Ordered blood work to complete prior to next visit.  -     Hemoglobin A1C; Future  -     Basic metabolic panel; Future  -     Albumin / creatinine urine ratio; Future  -     semaglutide, 2 mg/dose, (Ozempic, 2 MG/DOSE,) 8 mg/ mL injection pen; Inject 0.75 mL (2 mg total) under the skin every 7 days    Mixed hyperlipidemia    Advised to start on statin therapy and Zetia  Managed by PCP     Vitamin D deficiency  Vitamin D is 30  Take vitamin D3 1000 IU daily   -     Vitamin D 25 hydroxy; Future            Discussed with the patient diagnosis and treatment and all questions fully answered. She will call me if any problems arise.    Counseled patient on diagnostic results, prognosis, risk and benefit of treatment options, instruction for management, importance of treatment compliance, risk factor reduction and impressions.      Va Galloway PA-C

## 2024-06-12 ENCOUNTER — OFFICE VISIT (OUTPATIENT)
Dept: ENDOCRINOLOGY | Facility: CLINIC | Age: 39
End: 2024-06-12
Payer: COMMERCIAL

## 2024-06-12 VITALS
BODY MASS INDEX: 26.32 KG/M2 | WEIGHT: 139.4 LBS | DIASTOLIC BLOOD PRESSURE: 84 MMHG | HEART RATE: 82 BPM | SYSTOLIC BLOOD PRESSURE: 124 MMHG | HEIGHT: 61 IN | OXYGEN SATURATION: 99 %

## 2024-06-12 DIAGNOSIS — E78.2 MIXED HYPERLIPIDEMIA: ICD-10-CM

## 2024-06-12 DIAGNOSIS — E55.9 VITAMIN D DEFICIENCY: ICD-10-CM

## 2024-06-12 DIAGNOSIS — E11.65 TYPE 2 DIABETES MELLITUS WITH HYPERGLYCEMIA, WITHOUT LONG-TERM CURRENT USE OF INSULIN (HCC): Primary | ICD-10-CM

## 2024-06-12 PROCEDURE — 99214 OFFICE O/P EST MOD 30 MIN: CPT | Performed by: PHYSICIAN ASSISTANT

## 2024-06-13 ENCOUNTER — TELEPHONE (OUTPATIENT)
Dept: FAMILY MEDICINE CLINIC | Facility: CLINIC | Age: 39
End: 2024-06-13

## 2024-06-13 NOTE — TELEPHONE ENCOUNTER
----- Message from Lacey England DO sent at 6/13/2024 10:04 AM EDT -----  Pt with abnml labs and overdue for f/u - please schedule OV - thanks

## 2024-07-11 ENCOUNTER — OFFICE VISIT (OUTPATIENT)
Dept: FAMILY MEDICINE CLINIC | Facility: CLINIC | Age: 39
End: 2024-07-11
Payer: COMMERCIAL

## 2024-07-11 VITALS
RESPIRATION RATE: 16 BRPM | HEIGHT: 61 IN | SYSTOLIC BLOOD PRESSURE: 116 MMHG | BODY MASS INDEX: 25.49 KG/M2 | HEART RATE: 87 BPM | WEIGHT: 135 LBS | OXYGEN SATURATION: 98 % | DIASTOLIC BLOOD PRESSURE: 80 MMHG

## 2024-07-11 DIAGNOSIS — E78.5 HYPERLIPIDEMIA LDL GOAL <70: ICD-10-CM

## 2024-07-11 DIAGNOSIS — E11.9 TYPE 2 DIABETES MELLITUS WITHOUT COMPLICATION, UNSPECIFIED WHETHER LONG TERM INSULIN USE (HCC): Primary | ICD-10-CM

## 2024-07-11 DIAGNOSIS — Z12.4 CERVICAL CANCER SCREENING: ICD-10-CM

## 2024-07-11 DIAGNOSIS — Z12.31 ENCOUNTER FOR SCREENING MAMMOGRAM FOR MALIGNANT NEOPLASM OF BREAST: ICD-10-CM

## 2024-07-11 PROCEDURE — 99214 OFFICE O/P EST MOD 30 MIN: CPT | Performed by: FAMILY MEDICINE

## 2024-07-11 NOTE — PROGRESS NOTES
"Assessment/Plan:   Diagnoses and all orders for this visit:    Type 2 diabetes mellitus without complication, unspecified whether long term insulin use (HCC)  -     Albumin / creatinine urine ratio; Future  - A1c = 5.0   - follows with Endo and on Ozempic 2mg Qwk and tolerating it well  - UTD with PCV20   - UTD with DM foot exam and [facility] eye exam (2/12/2024)     Hyperlipidemia LDL goal <70  -     Lipid panel; Future  -  <-- 146  - has been taking Crestor 10mg QHS but has not started Zetia   - has lost weight since last OV on Ozempic   - (+) having intermittent tingling and numbness - could be 2/2 increased dose of statin   - for now, advised to cont statin as is and repeat labs in 2months to discern if regimen change is warranted   - The 10-year ASCVD risk score (Jose M MOBLEY, et al., 2019) is: 1%    Values used to calculate the score:      Age: 39 years      Sex: Female      Is Non- : Yes      Diabetic: Yes      Tobacco smoker: Yes      Systolic Blood Pressure: 116 mmHg      Is BP treated: No      HDL Cholesterol: 72 mg/dL      Total Cholesterol: 250 mg/dL    Cervical cancer screening  -     Ambulatory Referral to Obstetrics / Gynecology; Future  Encounter for screening mammogram for malignant neoplasm of breast  - UTD with annual Mammo - repeat annually           Subjective:    Patient ID: Gilma Montilla is a 39 y.o. female.  HPI  40yo F presents to the office for f/u of abnml labs   - reviewed labs done 6/11/2024  -  <-- 146  - A1c = 5.0 -- on Ozempic 2mg Qwk   - has been taking Crestor 10mg QHS but has not started Zetia   - has not been taking Wellbutrin XL   - had been taking Ambien 10mg QHS which has not been helping her sleep   - (+) middle toe on L-foot went numb a few weeks ago for \"15mins\"   - UTD with annual screening Mammo       The following portions of the patient's history were reviewed and updated as appropriate: allergies, current medications, past family history, " "past medical history, past social history, past surgical history and problem list.    Review of Systems  as per HPI    Objective:  /80 (BP Location: Left arm, Patient Position: Sitting, Cuff Size: Standard)   Pulse 87   Resp 16   Ht 5' 1\" (1.549 m)   Wt 61.2 kg (135 lb)   SpO2 98%   BMI 25.51 kg/m²    Physical Exam  Vitals reviewed.   Constitutional:       General: She is not in acute distress.     Appearance: Normal appearance. She is not ill-appearing, toxic-appearing or diaphoretic.   HENT:      Head: Normocephalic and atraumatic.      Right Ear: External ear normal.      Left Ear: External ear normal.      Nose: Nose normal.   Eyes:      General: No scleral icterus.        Right eye: No discharge.         Left eye: No discharge.      Extraocular Movements: Extraocular movements intact.      Conjunctiva/sclera: Conjunctivae normal.   Cardiovascular:      Rate and Rhythm: Normal rate and regular rhythm.      Heart sounds: Normal heart sounds.   Pulmonary:      Effort: Pulmonary effort is normal. No respiratory distress.      Breath sounds: Normal breath sounds. No stridor. No wheezing, rhonchi or rales.   Abdominal:      Palpations: Abdomen is soft.   Musculoskeletal:         General: Normal range of motion.      Cervical back: Normal range of motion.      Right lower leg: No edema.      Left lower leg: No edema.   Skin:     General: Skin is warm.   Neurological:      General: No focal deficit present.      Mental Status: She is alert and oriented to person, place, and time.   Psychiatric:         Mood and Affect: Mood normal.         Behavior: Behavior normal.         "

## 2024-09-27 ENCOUNTER — TELEPHONE (OUTPATIENT)
Age: 39
End: 2024-09-27

## 2024-10-02 ENCOUNTER — TELEPHONE (OUTPATIENT)
Age: 39
End: 2024-10-02

## 2024-10-02 NOTE — TELEPHONE ENCOUNTER
Contacted patient off of Medication Management  to verify needs of services in attempts to offer patient an appointment. LVM for patient to contact intake dept  in regards to scheduling an appointment. First attempt.

## 2024-10-12 ENCOUNTER — APPOINTMENT (OUTPATIENT)
Dept: LAB | Facility: CLINIC | Age: 39
End: 2024-10-12
Payer: COMMERCIAL

## 2024-10-12 DIAGNOSIS — E11.9 TYPE 2 DIABETES MELLITUS WITHOUT COMPLICATION, UNSPECIFIED WHETHER LONG TERM INSULIN USE (HCC): ICD-10-CM

## 2024-10-12 DIAGNOSIS — E55.9 VITAMIN D DEFICIENCY: ICD-10-CM

## 2024-10-12 DIAGNOSIS — E78.5 HYPERLIPIDEMIA LDL GOAL <70: ICD-10-CM

## 2024-10-12 DIAGNOSIS — E11.65 TYPE 2 DIABETES MELLITUS WITH HYPERGLYCEMIA, WITHOUT LONG-TERM CURRENT USE OF INSULIN (HCC): ICD-10-CM

## 2024-10-12 LAB
25(OH)D3 SERPL-MCNC: 22.4 NG/ML (ref 30–100)
ANION GAP SERPL CALCULATED.3IONS-SCNC: 5 MMOL/L (ref 4–13)
BUN SERPL-MCNC: 7 MG/DL (ref 5–25)
CALCIUM SERPL-MCNC: 9.1 MG/DL (ref 8.4–10.2)
CHLORIDE SERPL-SCNC: 104 MMOL/L (ref 96–108)
CHOLEST SERPL-MCNC: 241 MG/DL
CO2 SERPL-SCNC: 28 MMOL/L (ref 21–32)
CREAT SERPL-MCNC: 0.64 MG/DL (ref 0.6–1.3)
CREAT UR-MCNC: 198.7 MG/DL
EST. AVERAGE GLUCOSE BLD GHB EST-MCNC: 97 MG/DL
GFR SERPL CREATININE-BSD FRML MDRD: 111 ML/MIN/1.73SQ M
GLUCOSE P FAST SERPL-MCNC: 87 MG/DL (ref 65–99)
HBA1C MFR BLD: 5 %
HDLC SERPL-MCNC: 78 MG/DL
LDLC SERPL CALC-MCNC: 148 MG/DL (ref 0–100)
MICROALBUMIN UR-MCNC: 19.3 MG/L
MICROALBUMIN/CREAT 24H UR: 10 MG/G CREATININE (ref 0–30)
NONHDLC SERPL-MCNC: 163 MG/DL
POTASSIUM SERPL-SCNC: 3.6 MMOL/L (ref 3.5–5.3)
SODIUM SERPL-SCNC: 137 MMOL/L (ref 135–147)
TRIGL SERPL-MCNC: 76 MG/DL

## 2024-10-12 PROCEDURE — 82043 UR ALBUMIN QUANTITATIVE: CPT

## 2024-10-12 PROCEDURE — 36415 COLL VENOUS BLD VENIPUNCTURE: CPT

## 2024-10-12 PROCEDURE — 80061 LIPID PANEL: CPT

## 2024-10-12 PROCEDURE — 82570 ASSAY OF URINE CREATININE: CPT

## 2024-10-12 PROCEDURE — 82306 VITAMIN D 25 HYDROXY: CPT

## 2024-10-12 PROCEDURE — 83036 HEMOGLOBIN GLYCOSYLATED A1C: CPT

## 2024-10-12 PROCEDURE — 80048 BASIC METABOLIC PNL TOTAL CA: CPT

## 2024-10-16 ENCOUNTER — TELEPHONE (OUTPATIENT)
Dept: PSYCHIATRY | Facility: CLINIC | Age: 39
End: 2024-10-16

## 2024-10-17 ENCOUNTER — TELEPHONE (OUTPATIENT)
Age: 39
End: 2024-10-17

## 2024-10-17 ENCOUNTER — OFFICE VISIT (OUTPATIENT)
Dept: FAMILY MEDICINE CLINIC | Facility: CLINIC | Age: 39
End: 2024-10-17

## 2024-10-17 ENCOUNTER — TELEMEDICINE (OUTPATIENT)
Dept: PSYCHIATRY | Facility: CLINIC | Age: 39
End: 2024-10-17
Payer: COMMERCIAL

## 2024-10-17 VITALS
HEART RATE: 75 BPM | WEIGHT: 137 LBS | OXYGEN SATURATION: 100 % | SYSTOLIC BLOOD PRESSURE: 122 MMHG | BODY MASS INDEX: 25.86 KG/M2 | HEIGHT: 61 IN | RESPIRATION RATE: 16 BRPM | DIASTOLIC BLOOD PRESSURE: 78 MMHG

## 2024-10-17 DIAGNOSIS — Z28.21 INFLUENZA VACCINATION DECLINED BY PATIENT: ICD-10-CM

## 2024-10-17 DIAGNOSIS — F33.1 MODERATE EPISODE OF RECURRENT MAJOR DEPRESSIVE DISORDER (HCC): Primary | ICD-10-CM

## 2024-10-17 DIAGNOSIS — F41.8 ANXIETY WITH DEPRESSION: ICD-10-CM

## 2024-10-17 DIAGNOSIS — E78.5 HYPERLIPIDEMIA LDL GOAL <70: Primary | ICD-10-CM

## 2024-10-17 DIAGNOSIS — F41.0 PANIC ATTACKS: ICD-10-CM

## 2024-10-17 DIAGNOSIS — E11.9 TYPE 2 DIABETES MELLITUS WITHOUT COMPLICATION, UNSPECIFIED WHETHER LONG TERM INSULIN USE (HCC): ICD-10-CM

## 2024-10-17 DIAGNOSIS — Z12.4 CERVICAL CANCER SCREENING: ICD-10-CM

## 2024-10-17 DIAGNOSIS — F41.1 GAD (GENERALIZED ANXIETY DISORDER): ICD-10-CM

## 2024-10-17 PROCEDURE — 99205 OFFICE O/P NEW HI 60 MIN: CPT | Performed by: NURSE PRACTITIONER

## 2024-10-17 RX ORDER — ROSUVASTATIN CALCIUM 10 MG/1
10 TABLET, COATED ORAL
Qty: 90 TABLET | Refills: 1 | Status: SHIPPED | OUTPATIENT
Start: 2024-10-17

## 2024-10-17 RX ORDER — ARIPIPRAZOLE 2 MG/1
TABLET ORAL
Qty: 30 TABLET | Refills: 1 | Status: SHIPPED | OUTPATIENT
Start: 2024-10-17

## 2024-10-17 RX ORDER — LORAZEPAM 0.5 MG/1
TABLET ORAL
Qty: 45 TABLET | Refills: 1 | Status: SHIPPED | OUTPATIENT
Start: 2024-10-17

## 2024-10-17 RX ORDER — EZETIMIBE 10 MG/1
10 TABLET ORAL DAILY
Qty: 90 TABLET | Refills: 1 | Status: SHIPPED | OUTPATIENT
Start: 2024-10-17

## 2024-10-17 RX ORDER — ESCITALOPRAM OXALATE 5 MG/1
5 TABLET ORAL DAILY
Qty: 30 TABLET | Refills: 1 | Status: SHIPPED | OUTPATIENT
Start: 2024-10-17

## 2024-10-17 NOTE — ASSESSMENT & PLAN NOTE
Orders:    LORazepam (Ativan) 0.5 mg tablet; 1 tab in Daytime PRN for anxiety and 1-2 tabs HS PRN for sleep. Do not take with Ambien

## 2024-10-17 NOTE — ASSESSMENT & PLAN NOTE
Ativan 0.5 mg during the day as needed for anxiety and may take 1-2 tabs at bedtime to help with sleep

## 2024-10-17 NOTE — TELEPHONE ENCOUNTER
Contacted patient for Talk Therapy  to verify needs of services in attempts to offer patient an appointment at Available Office. Writer verified N/A - NO ANSWER. Writer JAQULEINEM and left callback number [phone]; option 3.    1st call attempt

## 2024-10-17 NOTE — PSYCH
Virtual Regular Visit    Verification of patient location:    Patient is located at Home in the following state in which I hold an active license [place]      Assessment/Plan:    Problem List Items Addressed This Visit       STELLA (generalized anxiety disorder)    Relevant Medications    escitalopram (LEXAPRO) 5 mg tablet    LORazepam (Ativan) 0.5 mg tablet    ARIPiprazole (ABILIFY) 2 mg tablet    Panic attacks    Anxiety with depression    Relevant Medications    escitalopram (LEXAPRO) 5 mg tablet    LORazepam (Ativan) 0.5 mg tablet    ARIPiprazole (ABILIFY) 2 mg tablet    Moderate episode of recurrent major depressive disorder (HCC) - Primary    Relevant Medications    escitalopram (LEXAPRO) 5 mg tablet    LORazepam (Ativan) 0.5 mg tablet    ARIPiprazole (ABILIFY) 2 mg tablet       Goals addressed in session: Alleviate depression and anxiety and improve function         Reason for visit is   Chief Complaint   Patient presents with    Anxiety    Depression    Mood Swings    Medication Management    Virtual Regular Visit          Encounter provider RASHEED Marte      Recent Visits  Date Type Provider Dept   10/16/24 Telephone RASHEED Marte [facility] [place]   Showing recent visits within past 7 days and meeting all other requirements  Today's Visits  Date Type Provider Dept   10/17/24 Telemedicine RASHEED Marte [facility] [place]   10/17/24 Office Visit Lacey England DO [facility]  [place]   Showing today's visits and meeting all other requirements  Future Appointments  No visits were found meeting these conditions.  Showing future appointments within next 150 days and meeting all other requirements       The patient was identified by name and date of birth. Gilma Montilla was informed that this is a telemedicine visit and that the visit is being conducted throughthe [email or web address] platform. She agrees to proceed..  My office door was closed. No one else was in the room.  She  "acknowledged consent and understanding of privacy and security of the video platform. The patient has agreed to participate and understands they can discontinue the visit at any time.    Patient is aware this is a billable service.     Subjective  Gilma Montilla is a 39 y.o. female who is seen today for a psychiatric evaluation.  The patient reports periods of anxiety and depression.  She reports times where she cannot sleep.  She worries very frequently about her 2 youngest daughters both of whom suffer from autism.  Both severe.  1 is age 16, the other is age 15.  She has an older daughter age 17 who is doing fairly well.  She actually finished high school year early and is enrolled in [employer].   Gilma reports low energy low interest and low motivation.  She is depressed.  Understandably, she is not sleeping well, she is not able to function well during the day and as a result, is starting to feel depressed.  She is not getting much done around the house.  Still her main concern are her 2 daughters who are now in school but are on the autism spectrum.  She is agreeable to trial medication.  She has been treated by her PCP and has not done well on Wellbutrin, it caused her more anxiety and has not done well on Zoloft because she did not like the way it made her feel.  We discussed some treatment options and we will follow-up with each other in 2 weeks  Past psychiatric history: The patient is never been seen by psychiatrist and she has never been hospitalized psychiatrically.  No history of suicide attempts. In the past, she has trialed Wellbutrin and Zoloft with no good result.  Family psychiatric history: The patient reports that her father has a history of addiction.  She reports that her mother \"worked a lot \"and she was raised mostly by her grandparents.  She reports that her sister, \"yells a lot \".  Drug and alcohol history: None  Access to weapons: None  Legal history: " None  Psychosocial history: The patient is  for 18 years to her , Bobby.  She describes it as a very good marriage.  They have 3 children.  3 daughters ages 17, 16 and 15.  The 16-year-old and 15-year-old suffer from severe autism.  Right now, they are both going to school.  Previous to this they were schooled in the home.  The youngest daughter suffers from seizures and this causes Sameerah a lot of anxiety especially at night.  She worries that her daughter will have a seizure at night and she will not be able to help her if she is sleeping.  As a result, she is sleeping intermittently not getting enough rest.  She is a stay-at-home mom.  She has not worked in approximately 13 years.  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  Mental status exam: Patient is awake and alert and oriented x 3.  Mood is depressed and anxious.  Patient is not suicidal, not homicidal and not psychotic.  Associations are intact.  No overt delusions.  Speech is clear and thoughts are scattered but goal oriented.  Attention span has been affected by her anxiety and depression.  Impulse control is good.  Judgment and insight are good.  Memory is good.  Treatment plan:  Start Lexapro 5 mg daily  Start Abilify 2 mg, half tab at bedtime for 1 week then increase to full tab  Ativan 0.5 mg may take 1 during the daytime if needed for anxiety and may take 1-2 at bedtime  Follow-up with me in 2 weeks      Safety plan: Patient is aware of the 24-hour on-call service offered by [facility].  Patient is also aware of the 24-hour crisis call service offered by [facility].  Patient has a very good support system with her  and family .  The patient agrees to call 911 or go directly to the emergency room if she begins to experience any suicidal ideation.     Controlled substance discussion: The patient will not take Ambien.  She agrees to trial the Ativan when she is at home.   She will not take it if she is going to operate a motor vehicle.  She is aware of the sedative effects that can be caused by the lorazepam.  However, the benefits outweigh this given the fact that she is so anxious and not sleeping at night.    Assessment & Plan  Panic attacks         STELLA (generalized anxiety disorder)    Orders:    LORazepam (Ativan) 0.5 mg tablet; 1 tab in Daytime PRN for anxiety and 1-2 tabs HS PRN for sleep. Do not take with Ambien    Anxiety with depression  Ativan 0.5 mg during the day as needed for anxiety and may take 1-2 tabs at bedtime to help with sleep         Moderate episode of recurrent major depressive disorder (HCC)    Orders:    escitalopram (LEXAPRO) 5 mg tablet; Take 1 tablet (5 mg total) by mouth daily    ARIPiprazole (ABILIFY) 2 mg tablet; 1/2 tab HS for 1 week, then 1 tab HS          Past Medical History:   Diagnosis Date    Acid reflux     Allergic     seasonal    Anxiety     Blurred vision     Diabetes mellitus (HCC)     Fainting     Headache     Irregular heart beat     Palpitations    Loss of appetite     Obesity     Spontaneous vaginal delivery     x3    Weakness        Past Surgical History:   Procedure Laterality Date    FL RETROGRADE PYELOGRAM  1/4/2020    FL RETROGRADE PYELOGRAM  1/22/2020    NO PAST SURGERIES      AK CYSTO/URETERO W/LITHOTRIPSY &INDWELL STENT INSRT Left 1/4/2020    Procedure: CYSTOSCOPY URETEROSCOPY , RETROGRADE PYELOGRAM AND INSERTION STENT URETERAL;  Surgeon: Tyler Atwood MD;  [facility];  Service: Urology    AK CYSTO/URETERO W/LITHOTRIPSY &INDWELL STENT INSRT Left 1/22/2020    Procedure: CYSTOSCOPY URETEROSCOPY WITH LITHOTRIPSY HOLMIUM LASER, RETROGRADE PYELOGRAM AND INSERTION STENT URETERAL;  Surgeon: Sarthak Tay MD;  [facility];  Service: Urology       Current Outpatient Medications   Medication Sig Dispense Refill    ARIPiprazole (ABILIFY) 2 mg tablet 1/2 tab HS for 1 week, then 1 tab HS 30 tablet 1     escitalopram (LEXAPRO) 5 mg tablet Take 1 tablet (5 mg total) by mouth daily 30 tablet 1    LORazepam (Ativan) 0.5 mg tablet 1 tab in Daytime PRN for anxiety and 1-2 tabs HS PRN for sleep. Do not take with Ambien 45 tablet 1    ezetimibe (ZETIA) 10 mg tablet Take 1 tablet (10 mg total) by mouth daily 90 tablet 1    rosuvastatin (CRESTOR) 10 MG tablet Take 1 tablet (10 mg total) by mouth daily at bedtime 90 tablet 1    semaglutide, 2 mg/dose, (Ozempic, 2 MG/DOSE,) 8 mg/ mL injection pen Inject 0.75 mL (2 mg total) under the skin every 7 days 9 mL 1     No current facility-administered medications for this visit.        Allergies   Allergen Reactions    Other Itching     apples       Review of Systems    Video Exam    There were no vitals filed for this visit.    Physical Exam     Visit Time    Visit Start Time: 11:00a  Visit Stop Time: 11:45a  Total Visit Duration: 45 minutes to an hour spent in the visit.  Time was spent reviewing the patient's record, interviewing the patient, reviewing medications, reviewing risk and benefits of taking medications, and completing the progress note.  Time was also spent establishing the treatment plan.

## 2024-10-17 NOTE — ASSESSMENT & PLAN NOTE
Orders:    escitalopram (LEXAPRO) 5 mg tablet; Take 1 tablet (5 mg total) by mouth daily    ARIPiprazole (ABILIFY) 2 mg tablet; 1/2 tab HS for 1 week, then 1 tab HS

## 2024-10-24 NOTE — TELEPHONE ENCOUNTER
Contacted patient for Talk Therapy  to verify needs of services in attempts to offer patient an appointment at [facility]. Writer verified N/A - NO ANSWER. Writer JAQUELINEM and left callback number [phone]; option 3.    2nd call attempt, pt removed from wait list, if pt returns call please offer to schedule or add back to wait list, if needed

## 2024-10-31 ENCOUNTER — TELEMEDICINE (OUTPATIENT)
Dept: PSYCHIATRY | Facility: CLINIC | Age: 39
End: 2024-10-31
Payer: COMMERCIAL

## 2024-10-31 DIAGNOSIS — F41.0 PANIC ATTACKS: ICD-10-CM

## 2024-10-31 DIAGNOSIS — F41.1 GAD (GENERALIZED ANXIETY DISORDER): ICD-10-CM

## 2024-10-31 DIAGNOSIS — F33.1 MODERATE EPISODE OF RECURRENT MAJOR DEPRESSIVE DISORDER (HCC): Primary | Chronic | ICD-10-CM

## 2024-10-31 PROCEDURE — 99214 OFFICE O/P EST MOD 30 MIN: CPT | Performed by: NURSE PRACTITIONER

## 2024-10-31 RX ORDER — BUSPIRONE HYDROCHLORIDE 10 MG/1
TABLET ORAL
Qty: 60 TABLET | Refills: 1 | Status: SHIPPED | OUTPATIENT
Start: 2024-10-31

## 2024-10-31 RX ORDER — ARIPIPRAZOLE 2 MG/1
TABLET ORAL
Start: 2024-10-31

## 2024-10-31 RX ORDER — LORAZEPAM 0.5 MG/1
TABLET ORAL
Qty: 120 TABLET | Refills: 1 | Status: SHIPPED | OUTPATIENT
Start: 2024-10-31

## 2024-10-31 RX ORDER — ESCITALOPRAM OXALATE 10 MG/1
10 TABLET ORAL DAILY
Qty: 30 TABLET | Refills: 1 | Status: SHIPPED | OUTPATIENT
Start: 2024-10-31

## 2024-10-31 NOTE — ASSESSMENT & PLAN NOTE
Orders:    ARIPiprazole (ABILIFY) 2 mg tablet; 1 Tab in AM    escitalopram (LEXAPRO) 10 mg tablet; Take 1 tablet (10 mg total) by mouth daily

## 2024-10-31 NOTE — PSYCH
Virtual Regular Visit    Verification of patient location:    Patient is located at Home in the following state in which I hold an active license [place]      Assessment/Plan:    Problem List Items Addressed This Visit       STELLA (generalized anxiety disorder)    Relevant Medications    ARIPiprazole (ABILIFY) 2 mg tablet    escitalopram (LEXAPRO) 10 mg tablet    LORazepam (Ativan) 0.5 mg tablet    busPIRone (BUSPAR) 10 mg tablet    Moderate episode of recurrent major depressive disorder (HCC) - Primary    Relevant Medications    ARIPiprazole (ABILIFY) 2 mg tablet    escitalopram (LEXAPRO) 10 mg tablet    LORazepam (Ativan) 0.5 mg tablet    busPIRone (BUSPAR) 10 mg tablet     Other Visit Diagnoses       Panic attacks                Goals addressed in session: Continue to work on decreasing anxiety and improving function         Reason for visit is   Chief Complaint   Patient presents with    Anxiety    Depression    Mood Swings    Medication Management    Virtual Regular Visit          Encounter provider RASHEED Marte      Recent Visits  No visits were found meeting these conditions.  Showing recent visits within past 7 days and meeting all other requirements  Today's Visits  Date Type Provider Dept   10/31/24 Telemedicine RASHEED Marte  [facility]   Showing today's visits and meeting all other requirements  Future Appointments  No visits were found meeting these conditions.  Showing future appointments within next 150 days and meeting all other requirements       The patient was identified by name and date of birth. Gilma Montilla was informed that this is a telemedicine visit and that the visit is being conducted throughthe [employer] platform. She agrees to proceed..  My office door was closed. No one else was in the room.  She acknowledged consent and understanding of privacy and security of the video platform. The patient has agreed to participate and understands they can  discontinue the visit at any time.    Patient is aware this is a billable service.     Ozzie Montilla is a 39 y.o. female who has a history of depressive disorder, anxiety disorder.  She also has difficulty focusing difficulty staying on task secondary to being overwhelmed with her life stressors.  As noted in her psychiatric evaluation, the patient has 3 daughters all of whom suffer from some form of autism.  Sober date is very challenging.  She finds some relief from lorazepam so we will give her lorazepam 0.5 mg twice daily and she can also take up to 1 mg at bedtime to help with sleep until all other medicine starts to kick in.  She will move Abilify from nighttime to daytime at 2 mg and we will increase Lexapro up to 10 mg daily.  We will follow-up with each other in the next 2 weeks.  She has been thankful that she has slept somewhat over the last several weeks and I am hoping we can get her better sleep cycle.  Still very anxious during the day and that we need to work on for her.  Mental status exam: Patient is awake and alert and oriented x 3.  Mood is still anxious still very overwhelmed.  Again a lot of it is from her life stressors.  Patient is not suicidal, not homicidal and not psychotic.  Associations are intact.  No overt delusions.  Speech is clear and thoughts are scattered and disorganized and she does have difficulty staying on task.  Attention span is variable.  Mostly related on her stress level.  Again, this patient has a lot going on in her life and she understandably has difficulty focusing.  We may need to trial low-dose stimulant.  Judgment and insight are good.  Memory is good.  The patient will continue on:  Move Abilify 2 mg to bedtime  Increase Lexapro to 10 mg daily  Add BuSpar 10 mg twice daily  Ativan 0.5 mg twice daily and 1 mg at bedtime  Monitor improvement of mood and possibly consider low-dose stimulant in place of the Lexapro.  Follow-up with me in 2 weeks or  sooner if necessary.    We have discussed their safety plan and pt agrees that if they experience unsafe thoughts that they will reach out to their supports including this office, the suicide hotline, and emergency services if necessary.   Follow-up with me in 2 weeks or sooner if necessary..    Assessment & Plan  Moderate episode of recurrent major depressive disorder (HCC)    Orders:    ARIPiprazole (ABILIFY) 2 mg tablet; 1 Tab in AM    escitalopram (LEXAPRO) 10 mg tablet; Take 1 tablet (10 mg total) by mouth daily    STELLA (generalized anxiety disorder)    Orders:    LORazepam (Ativan) 0.5 mg tablet; 1 tab in AM and 1 tab at 2PM and 2 tabs HS    busPIRone (BUSPAR) 10 mg tablet; 1 tab in AM and 1 tab HS      Treatment status:  The patient is not at goal.  She is still suffers from anxiety and we are adjusting medications.  She has 3 daughters who suffer from autism.  We may need to consider a low-dose stimulant for her to stand test.  Treatment plan: Continue to follow frequently and adjust medications as per symptoms.            Past Medical History:   Diagnosis Date    Acid reflux     Allergic     seasonal    Anxiety     Blurred vision     Diabetes mellitus (HCC)     Fainting     Headache     Irregular heart beat     Palpitations    Loss of appetite     Obesity     Spontaneous vaginal delivery     x3    Weakness        Past Surgical History:   Procedure Laterality Date    FL RETROGRADE PYELOGRAM  1/4/2020    FL RETROGRADE PYELOGRAM  1/22/2020    NO PAST SURGERIES      [place] CYSTO/URETERO W/LITHOTRIPSY &INDWELL STENT INSRT Left 1/4/2020    Procedure: CYSTOSCOPY URETEROSCOPY , RETROGRADE PYELOGRAM AND INSERTION STENT URETERAL;  Surgeon: Tyler Atwood MD;  [facility];  Service: Urology    [place] CYSTO/URETERO W/LITHOTRIPSY &INDWELL STENT INSRT Left 1/22/2020    Procedure: CYSTOSCOPY URETEROSCOPY WITH LITHOTRIPSY HOLMIUM LASER, RETROGRADE PYELOGRAM AND INSERTION STENT URETERAL;  Surgeon: Sarthak Tay MD;   [facility];  Service: Urology       Current Outpatient Medications   Medication Sig Dispense Refill    ARIPiprazole (ABILIFY) 2 mg tablet 1 Tab in AM      busPIRone (BUSPAR) 10 mg tablet 1 tab in AM and 1 tab HS 60 tablet 1    escitalopram (LEXAPRO) 10 mg tablet Take 1 tablet (10 mg total) by mouth daily 30 tablet 1    LORazepam (Ativan) 0.5 mg tablet 1 tab in AM and 1 tab at 2PM and 2 tabs  tablet 1    ezetimibe (ZETIA) 10 mg tablet Take 1 tablet (10 mg total) by mouth daily 90 tablet 1    rosuvastatin (CRESTOR) 10 MG tablet Take 1 tablet (10 mg total) by mouth daily at bedtime 90 tablet 1    semaglutide, 2 mg/dose, (Ozempic, 2 MG/DOSE,) 8 mg/ mL injection pen Inject 0.75 mL (2 mg total) under the skin every 7 days 9 mL 1     No current facility-administered medications for this visit.        Allergies   Allergen Reactions    Other Itching     apples       Review of Systems    Video Exam    There were no vitals filed for this visit.    Physical Exam     Visit Time    Visit Start Time: 10:00a  Visit Stop Time: 10:30a  Total Visit Duration: 30 minutes were spent in the visit.  Time spent reviewing the treatment plan, reviewing medications, ordering medications and completing the progress note.

## 2024-10-31 NOTE — ASSESSMENT & PLAN NOTE
Orders:    LORazepam (Ativan) 0.5 mg tablet; 1 tab in AM and 1 tab at 2PM and 2 tabs HS    busPIRone (BUSPAR) 10 mg tablet; 1 tab in AM and 1 tab HS

## 2024-11-13 ENCOUNTER — TELEPHONE (OUTPATIENT)
Dept: PSYCHIATRY | Facility: CLINIC | Age: 39
End: 2024-11-13

## 2024-11-13 NOTE — TELEPHONE ENCOUNTER
Called and left message notifying patient that her appt schedule for tomorrow was cancelled due to provider will not be at the office. A call back was requested to reschedule for Monday or Tuesday.

## 2024-11-29 ENCOUNTER — TELEPHONE (OUTPATIENT)
Age: 39
End: 2024-11-29

## 2024-11-29 NOTE — TELEPHONE ENCOUNTER
Symptoms were discussed with Dr. Ortiz and he advised that patient be seen at the care now to be evaluated.  I spoke with Gilma and advised her.

## 2024-11-29 NOTE — TELEPHONE ENCOUNTER
Pt called in requesting if a medication can be called in for her. Pt states her children are all currently sick with strep throat and she is now experiencing the same symptoms. Pt is requesting if a medication can be called in for her as soon as possible to help with these symptoms.    Please advise.

## 2024-12-19 ENCOUNTER — OFFICE VISIT (OUTPATIENT)
Dept: ENDOCRINOLOGY | Facility: CLINIC | Age: 39
End: 2024-12-19
Payer: COMMERCIAL

## 2024-12-19 VITALS
WEIGHT: 135.8 LBS | BODY MASS INDEX: 25.64 KG/M2 | HEIGHT: 61 IN | DIASTOLIC BLOOD PRESSURE: 68 MMHG | SYSTOLIC BLOOD PRESSURE: 112 MMHG | HEART RATE: 75 BPM | OXYGEN SATURATION: 99 %

## 2024-12-19 DIAGNOSIS — E55.9 VITAMIN D DEFICIENCY: ICD-10-CM

## 2024-12-19 DIAGNOSIS — E11.65 TYPE 2 DIABETES MELLITUS WITH HYPERGLYCEMIA, WITHOUT LONG-TERM CURRENT USE OF INSULIN (HCC): Primary | ICD-10-CM

## 2024-12-19 DIAGNOSIS — E78.2 MIXED HYPERLIPIDEMIA: ICD-10-CM

## 2024-12-19 PROCEDURE — 99214 OFFICE O/P EST MOD 30 MIN: CPT | Performed by: INTERNAL MEDICINE

## 2024-12-19 NOTE — PROGRESS NOTES
Gilma Montilla 39 y.o. female MRN: [id number]    Encounter: [id number]      Assessment & Plan     Assessment:  This is a 39 y.o.-year-old female with diabetes with hyperglycemia.    Plan:  Diagnoses and all orders for this visit:    Type 2 diabetes mellitus with hyperglycemia, without long-term current use of insulin ([facility])  Lab Results   Component Value Date    HGBA1C 5.0 10/12/2024   A1c is 5.0%, at goal  Continue Ozempic 2 mg once a week  Continue to monitor A1c every 6 months  -     Albumin / creatinine urine ratio; Future  -     Hemoglobin A1C; Future  -     Basic metabolic panel; Future  -     semaglutide, 2 mg/dose, (Ozempic, 2 MG/DOSE,) 8 mg/ mL injection pen; Inject 0.75 mL (2 mg total) under the skin every 7 days    Mixed hyperlipidemia  Lab Results   Component Value Date    LDLCALC 148 (H) 10/12/2024   LDL is 148, goal for LDL is less than 70 mg per DL  Currently on Crestor 10 mg daily by PCP.  Patient was recently started on Crestor.  Re-Enforced lifestyle modification, importance of weight loss  Follow-up with PCP  Vitamin D deficiency  Continue vitamin D3 supplementation 2000 IU daily       CC: Diabetes    History of Present Illness     HPI:  Gilma Montilla is 39-year-old female with history of type 2 diabetes with long-term insulin use with previous A1c of 11.7% is here for follow-up.  She denies any issues with her current insulin regimen.  She denies severe hypoglycemic or hyperglycemic episodes requiring hospitalization.  Current regimen includes Ozempic 2 mg once a week.  She is compliant with Ozempic all the time.  She denies any side effects.    Complications of diabetes are none  For hypercholesterolemia, she takes Crestor 10 mg at bedtime, Zetia 10 mg daily    She takes Lexapro 10 mg daily    Component      Latest Ref Rng 10/12/2024   Sodium      135 - 147 mmol/L 137    Potassium      3.5 - 5.3 mmol/L 3.6    Chloride      96 - 108 mmol/L 104    Carbon Dioxide      21 - 32 mmol/L 28    ANION  GAP      4 - 13 mmol/L 5    BUN      5 - 25 mg/dL 7    Creatinine      0.60 - 1.30 mg/dL 0.64    GLUCOSE, FASTING      65 - 99 mg/dL 87    Calcium      8.4 - 10.2 mg/dL 9.1    GFR, Calculated      ml/min/1.73sq m 111    Cholesterol      See Comment mg/dL 241 (H)    Triglycerides      See Comment mg/dL 76    HDL      >=50 mg/dL 78    LDL Calculated      0 - 100 mg/dL 148 (H)    Non-HDL Cholesterol      mg/dl 163    EXT Creatinine Urine      Reference range not established. mg/dL 198.7    Albumin,U,Random      <20.0 mg/L 19.3    Albumin Creat Ratio      0 - 30 mg/g creatinine 10    Hemoglobin A1C      Normal 4.0-5.6%; PreDiabetic 5.7-6.4%; Diabetic >=6.5%; Glycemic control for adults with diabetes <7.0% % 5.0    eAG, EST AVG Glucose      mg/dl 97    VITAMIN D, 25-HYDROXY      30.0 - 100.0 ng/mL 22.4 (L)           Lab Results   Component Value Date    LXA4KMJPAPVC 1.727 06/11/2024       Wt Readings from Last 3 Encounters:   12/19/24 61.6 kg (135 lb 12.8 oz)   10/17/24 62.1 kg (137 lb)   07/11/24 61.2 kg (135 lb)        Lab Results   Component Value Date    HGBA1C 5.0 10/12/2024          Review of Systems   Constitutional:  Negative for activity change, diaphoresis, fatigue, fever and unexpected weight change.   HENT: Negative.     Eyes:  Negative for visual disturbance.   Respiratory:  Negative for cough, chest tightness and shortness of breath.    Cardiovascular:  Negative for chest pain, palpitations and leg swelling.   Gastrointestinal:  Negative for abdominal pain, blood in stool, constipation, diarrhea, nausea and vomiting.   Endocrine: Negative for cold intolerance, heat intolerance, polydipsia, polyphagia and polyuria.   Genitourinary:  Negative for dysuria, enuresis, frequency and urgency.   Musculoskeletal:  Negative for arthralgias and myalgias.   Skin:  Negative for pallor, rash and wound.   Allergic/Immunologic: Negative.    Neurological:  Negative for dizziness, tremors, weakness and numbness.    Hematological: Negative.    Psychiatric/Behavioral: Negative.         Historical Information   Past Medical History:   Diagnosis Date    Acid reflux     Allergic     seasonal    Anxiety     Blurred vision     Diabetes mellitus (HCC)     Fainting     Headache     Irregular heart beat     Palpitations    Loss of appetite     Obesity     Spontaneous vaginal delivery     x3    Weakness      Past Surgical History:   Procedure Laterality Date    FL RETROGRADE PYELOGRAM  1/4/2020    FL RETROGRADE PYELOGRAM  1/22/2020    NO PAST SURGERIES      [place] CYSTO/URETERO W/LITHOTRIPSY &INDWELL STENT INSRT Left 1/4/2020    Procedure: CYSTOSCOPY URETEROSCOPY , RETROGRADE PYELOGRAM AND INSERTION STENT URETERAL;  Surgeon: Tyler Atwood MD;  [facility];  Service: Urology    [place] CYSTO/URETERO W/LITHOTRIPSY &INDWELL STENT INSRT Left 1/22/2020    Procedure: CYSTOSCOPY URETEROSCOPY WITH LITHOTRIPSY HOLMIUM LASER, RETROGRADE PYELOGRAM AND INSERTION STENT URETERAL;  Surgeon: Sarthak Tay MD;  Location[facility];  Service: Urology     Social History   Social History     Substance and Sexual Activity   Alcohol Use Yes    Comment: monthly     Social History     Substance and Sexual Activity   Drug Use Never     Social History     Tobacco Use   Smoking Status Some Days    Current packs/day: 0.25    Average packs/day: 0.3 packs/day for 20.0 years (5.0 ttl pk-yrs)    Types: Cigarettes   Smokeless Tobacco Never   Tobacco Comments    currently 7cigs/day     Family History:   Family History   Problem Relation Age of Onset    Hypertension Mother     Anemia Father     Leukemia Sister     Fibroids Sister     No Known Problems Sister     No Known Problems Sister     Breast cancer Maternal Grandmother 45    Diabetes Maternal Grandmother     Diabetes Paternal Grandmother     No Known Problems Brother     Multiple sclerosis Cousin     Thyroid disease Other     Autism Other     Colon cancer Neg Hx     Cervical cancer Neg Hx     Stroke  "Neg Hx        Meds/Allergies   Current Outpatient Medications   Medication Sig Dispense Refill    ARIPiprazole (ABILIFY) 2 mg tablet 1 Tab in AM      busPIRone (BUSPAR) 10 mg tablet 1 tab in AM and 1 tab HS 60 tablet 1    escitalopram (LEXAPRO) 10 mg tablet Take 1 tablet (10 mg total) by mouth daily 30 tablet 1    ezetimibe (ZETIA) 10 mg tablet Take 1 tablet (10 mg total) by mouth daily 90 tablet 1    LORazepam (Ativan) 0.5 mg tablet 1 tab in AM and 1 tab at 2PM and 2 tabs  tablet 1    rosuvastatin (CRESTOR) 10 MG tablet Take 1 tablet (10 mg total) by mouth daily at bedtime 90 tablet 1    semaglutide, 2 mg/dose, (Ozempic, 2 MG/DOSE,) 8 mg/ mL injection pen Inject 0.75 mL (2 mg total) under the skin every 7 days 9 mL 1     No current facility-administered medications for this visit.     Allergies   Allergen Reactions    Other Itching     apples       Objective   Vitals: Blood pressure 112/68, pulse 75, height 5' 1\" (1.549 m), weight 61.6 kg (135 lb 12.8 oz), SpO2 99%.    Physical Exam  Constitutional:       General: She is not in acute distress.     Appearance: Normal appearance. She is not ill-appearing.   HENT:      Head: Normocephalic and atraumatic.      Nose: Nose normal.   Eyes:      Extraocular Movements: Extraocular movements intact.      Conjunctiva/sclera: Conjunctivae normal.   Cardiovascular:      Rate and Rhythm: Normal rate and regular rhythm.      Pulses: Normal pulses.      Heart sounds: Normal heart sounds.   Pulmonary:      Effort: No respiratory distress.   Musculoskeletal:      Cervical back: Normal range of motion and neck supple.   Neurological:      General: No focal deficit present.      Mental Status: She is alert and oriented to person, place, and time.   Psychiatric:         Mood and Affect: Mood normal.         Behavior: Behavior normal.         The history was obtained from the review of the chart, patient.    Lab Results:   Lab Results   Component Value Date/Time    Hemoglobin A1C " "5.0 10/12/2024 09:20 AM    Hemoglobin A1C 5.0 06/11/2024 09:52 AM    BUN 7 10/12/2024 09:20 AM    BUN 8 06/11/2024 09:52 AM    Potassium 3.6 10/12/2024 09:20 AM    Potassium 3.4 (L) 06/11/2024 09:52 AM    Chloride 104 10/12/2024 09:20 AM    Chloride 104 06/11/2024 09:52 AM    CO2 28 10/12/2024 09:20 AM    CO2 26 06/11/2024 09:52 AM    Creatinine 0.64 10/12/2024 09:20 AM    Creatinine 0.63 06/11/2024 09:52 AM    AST 18 06/11/2024 09:52 AM    ALT 13 06/11/2024 09:52 AM    Total Protein 7.5 06/11/2024 09:52 AM    Albumin 4.1 06/11/2024 09:52 AM    HDL, Direct 78 10/12/2024 09:20 AM    HDL, Direct 72 06/11/2024 09:52 AM    HDL, Direct 83 04/05/2024 08:55 AM    Triglycerides 76 10/12/2024 09:20 AM    Triglycerides 74 06/11/2024 09:52 AM    Triglycerides 100 04/05/2024 08:55 AM           Imaging Studies: Results Review Statement: No pertinent imaging studies reviewed.    Portions of the record may have been created with voice recognition software. Occasional wrong word or \"sound a like\" substitutions may have occurred due to the inherent limitations of voice recognition software. Read the chart carefully and recognize, using context, where substitutions have occurred.    "

## 2025-01-13 ENCOUNTER — OFFICE VISIT (OUTPATIENT)
Dept: OBGYN CLINIC | Facility: CLINIC | Age: 40
End: 2025-01-13
Payer: COMMERCIAL

## 2025-01-13 VITALS
HEIGHT: 61 IN | WEIGHT: 133.2 LBS | SYSTOLIC BLOOD PRESSURE: 104 MMHG | BODY MASS INDEX: 25.15 KG/M2 | DIASTOLIC BLOOD PRESSURE: 60 MMHG

## 2025-01-13 DIAGNOSIS — Z01.419 ENCOUNTER FOR ROUTINE GYNECOLOGICAL EXAMINATION WITH PAPANICOLAOU SMEAR OF CERVIX: ICD-10-CM

## 2025-01-13 DIAGNOSIS — Z12.31 ENCOUNTER FOR SCREENING MAMMOGRAM FOR MALIGNANT NEOPLASM OF BREAST: ICD-10-CM

## 2025-01-13 DIAGNOSIS — Z11.51 SCREENING FOR HUMAN PAPILLOMAVIRUS (HPV): ICD-10-CM

## 2025-01-13 DIAGNOSIS — Z12.4 CERVICAL CANCER SCREENING: ICD-10-CM

## 2025-01-13 DIAGNOSIS — Z01.419 ENCOUNTER FOR WELL WOMAN EXAM: Primary | ICD-10-CM

## 2025-01-13 DIAGNOSIS — Z12.39 ENCOUNTER FOR SCREENING BREAST EXAMINATION: ICD-10-CM

## 2025-01-13 PROCEDURE — G0145 SCR C/V CYTO,THINLAYER,RESCR: HCPCS | Performed by: PHYSICIAN ASSISTANT

## 2025-01-13 PROCEDURE — S0610 ANNUAL GYNECOLOGICAL EXAMINA: HCPCS | Performed by: PHYSICIAN ASSISTANT

## 2025-01-13 PROCEDURE — G0476 HPV COMBO ASSAY CA SCREEN: HCPCS | Performed by: PHYSICIAN ASSISTANT

## 2025-01-13 NOTE — PROGRESS NOTES
Assessment/Plan:      Diagnoses and all orders for this visit:    Encounter for well woman exam    Encounter for screening breast examination    Cervical cancer screening  -     Liquid-based pap, screening    Screening for human papillomavirus (HPV)  -     Liquid-based pap, screening    Encounter for routine gynecological examination with Papanicolaou smear of cervix  -     Liquid-based pap, screening    Encounter for screening mammogram for malignant neoplasm of breast  -     Mammo screening bilateral w 3d and cad; Future          Subjective:     Patient ID: Gilma Montilla is a 39 y.o. female.    Pt presents for her annual exam today--  She has complaints of heavier, more painful cycles--getting worse as she gets older  Is interested in some kind of BC  Pt is a smoker  She has no bleeding or pelvic pain  Bowel and bladder are regular  Colonoscopy--  No breast concerns today  Last mammo--2024    All BC options discussed in detail--risks, benefits, side effects  pap today.    Rx mammo  Will rto with next period for Nexplanon insertion      Review of Systems   Constitutional:  Negative for chills, fever and unexpected weight change.   HENT:  Negative for ear pain and sore throat.    Eyes:  Negative for pain and visual disturbance.   Respiratory:  Negative for cough and shortness of breath.    Cardiovascular:  Negative for chest pain and palpitations.   Gastrointestinal:  Negative for abdominal pain, blood in stool, constipation, diarrhea and vomiting.   Genitourinary: Negative.  Negative for dysuria and hematuria.   Musculoskeletal:  Negative for arthralgias and back pain.   Skin:  Negative for color change and rash.   Neurological:  Negative for seizures and syncope.   All other systems reviewed and are negative.        Objective:     Physical Exam  Vitals and nursing note reviewed.   Constitutional:       Appearance: Normal appearance. She is well-developed.   HENT:      Head: Normocephalic and atraumatic.   Chest:    Breasts:     Right: No inverted nipple, mass, nipple discharge or skin change.      Left: No inverted nipple, mass, nipple discharge or skin change.   Abdominal:      Palpations: Abdomen is soft.   Genitourinary:     General: Normal vulva.      Exam position: Supine.      Labia:         Right: No rash, tenderness or lesion.         Left: No rash, tenderness or lesion.       Vagina: Normal.      Cervix: No cervical motion tenderness, discharge or friability.      Uterus: Normal.       Adnexa: Right adnexa normal and left adnexa normal.        Right: No mass, tenderness or fullness.          Left: No mass, tenderness or fullness.     Musculoskeletal:      Cervical back: Normal range of motion.   Lymphadenopathy:      Lower Body: No right inguinal adenopathy. No left inguinal adenopathy.   Neurological:      Mental Status: She is alert.

## 2025-01-15 ENCOUNTER — PROCEDURE VISIT (OUTPATIENT)
Dept: OBGYN CLINIC | Facility: CLINIC | Age: 40
End: 2025-01-15
Payer: COMMERCIAL

## 2025-01-15 VITALS
WEIGHT: 133.8 LBS | BODY MASS INDEX: 25.26 KG/M2 | DIASTOLIC BLOOD PRESSURE: 66 MMHG | SYSTOLIC BLOOD PRESSURE: 98 MMHG | HEIGHT: 61 IN

## 2025-01-15 DIAGNOSIS — Z30.017 NEXPLANON INSERTION: Primary | ICD-10-CM

## 2025-01-15 LAB — SL AMB POCT URINE HCG: NEGATIVE

## 2025-01-15 PROCEDURE — 81025 URINE PREGNANCY TEST: CPT | Performed by: OBSTETRICS & GYNECOLOGY

## 2025-01-15 PROCEDURE — 11981 INSERTION DRUG DLVR IMPLANT: CPT | Performed by: OBSTETRICS & GYNECOLOGY

## 2025-01-15 NOTE — PROGRESS NOTES
Universal Protocol:  Patient understanding: patient states understanding of the procedure being performed  Patient consent: the patient's understanding of the procedure matches consent given  Procedure consent: procedure consent matches procedure scheduled  Required items: required blood products, implants, devices, and special equipment available  Remove and insert drug implant    Date/Time: 1/15/2025 11:00 AM    Performed by: Amanda Paul MD  Authorized by: Amanda Paul MD    Indication:     Indication: Insertion of non-biodegradable drug delivery implant    Pre-procedure:     Prepped with: alcohol 70% and povidone-iodine      Local anesthetic:  Lidocaine 1%  Procedure:     Procedure:  Insertion    Left/right:  Left    Preloaded contraceptive capsule trocar was placed subdermally: yes      Visualization of implant was obtained: yes      Contraceptive capsule was inserted and trocar removed: yes      Visualization of notch in stylet and palpation of device: yes      Palpation confirms placement by provider and patient: yes      Site was closed with steri-strips and pressure bandage applied: yes    Comments:      Lot # [id number]

## 2025-01-16 ENCOUNTER — NURSE TRIAGE (OUTPATIENT)
Age: 40
End: 2025-01-16

## 2025-01-16 LAB
LAB AP GYN PRIMARY INTERPRETATION: NORMAL
LAB AP LMP: NORMAL
Lab: NORMAL

## 2025-01-16 NOTE — TELEPHONE ENCOUNTER
"Patient asking if the nexplanon insertion in her left arm, should be causing pain in her right leg.    Patient reporting right leg pain- from the center of her thigh all the way down. Had nexplanon inserted yesterday into her left arm. Is also diabetic. Denies any fevers, warmth, redness, swelling, trouble breathing, SOB. No other symptoms. Tried using a massage gun on the leg.   Informed patient this is not a usual symptom of the nexplanon insertion and she should contact her PCP for further advice/eval. Discussed with patient tylenol/ibuprofen for pain control. ED precautions reviewed.     Reason for Disposition   Leg pain    Answer Assessment - Initial Assessment Questions  1. ONSET: \"When did the pain start?\"       Last night   2. LOCATION: \"Where is the pain located?\"       Right leg pain   3. PAIN: \"How bad is the pain?\"    (Scale 1-10; or mild, moderate, severe)      7/10   4. WORK OR EXERCISE: \"Has there been any recent work or exercise that involved this part of the body?\"       Denies  5. CAUSE: \"What do you think is causing the leg pain?\"      unsure  6. OTHER SYMPTOMS: \"Do you have any other symptoms?\" (e.g., chest pain, back pain, breathing difficulty, swelling, rash, fever, numbness, weakness)      Denies  7. PREGNANCY: \"Is there any chance you are pregnant?\" \"When was your last menstrual period?\"      Denies, LMP 1/15, Nexplanon inserted yesterday    Protocols used: Leg Pain-Adult-[place]    "

## 2025-01-17 ENCOUNTER — RESULTS FOLLOW-UP (OUTPATIENT)
Dept: OBGYN CLINIC | Facility: CLINIC | Age: 40
End: 2025-01-17

## 2025-01-17 ENCOUNTER — NURSE TRIAGE (OUTPATIENT)
Age: 40
End: 2025-01-17

## 2025-01-17 NOTE — TELEPHONE ENCOUNTER
"Patient calls in today, because yesterday, she had an episode, where her leg went numb and she had to drag it.  There was also a shooting pain down her leg as well.  She laid down on the floor, for about an hour and it subsided.  Today, she has pain in her left thigh area, right below her hip.  She has tried multiple OTC remedies without relief.  She has tried heat and cold.  She denies any recent injury.  Denies back pain, difficulty with bowel or urination.  Denies numbness, weakness, tingling, headache, visual or gait disturbance.  She denies swelling, redness or warmth.      She would like an appointment.  Advised no appointments available.  She is asking for something to be called into the pharmacy.  She does have a scheduled appointment on 1/21.        Preferred Pharmacy:   [facility] - [place] - [address] Phone: [phone]   Fax: [phone]        Reason for Disposition   Patient wants to be seen    Answer Assessment - Initial Assessment Questions  1. ONSET: \"When did the pain start?\"       Yesterday morning.   2. LOCATION: \"Where is the pain located?\"       Left leg, right now it is at the thigh hip area.    3. PAIN: \"How bad is the pain?\"    (Scale 1-10; or mild, moderate, severe)      7/10  4. WORK OR EXERCISE: \"Has there been any recent work or exercise that involved this part of the body?\"       Not sure   5. CAUSE: \"What do you think is causing the leg pain?\"      Unknown   6. OTHER SYMPTOMS: \"Do you have any other symptoms?\" (e.g., chest pain, back pain, breathing difficulty, swelling, rash, fever, numbness, weakness)      Leg went numb and was dragging and shooting pain (lasted for about an hour)   7. PREGNANCY: \"Is there any chance you are pregnant?\" \"When was your last menstrual period?\"      Denies    Protocols used: Leg Pain-Adult-[place]    "

## 2025-01-21 ENCOUNTER — OFFICE VISIT (OUTPATIENT)
Dept: FAMILY MEDICINE CLINIC | Facility: CLINIC | Age: 40
End: 2025-01-21
Payer: COMMERCIAL

## 2025-01-21 VITALS
RESPIRATION RATE: 16 BRPM | HEART RATE: 73 BPM | SYSTOLIC BLOOD PRESSURE: 110 MMHG | OXYGEN SATURATION: 99 % | DIASTOLIC BLOOD PRESSURE: 68 MMHG | WEIGHT: 136 LBS | HEIGHT: 61 IN | BODY MASS INDEX: 25.68 KG/M2

## 2025-01-21 DIAGNOSIS — E78.5 HYPERLIPIDEMIA LDL GOAL <70: ICD-10-CM

## 2025-01-21 DIAGNOSIS — Z71.6 ENCOUNTER FOR SMOKING CESSATION COUNSELING: ICD-10-CM

## 2025-01-21 DIAGNOSIS — F17.219 CIGARETTE NICOTINE DEPENDENCE WITH NICOTINE-INDUCED DISORDER: ICD-10-CM

## 2025-01-21 DIAGNOSIS — Z28.21 INFLUENZA VACCINATION DECLINED BY PATIENT: ICD-10-CM

## 2025-01-21 DIAGNOSIS — E11.65 TYPE 2 DIABETES MELLITUS WITH HYPERGLYCEMIA, WITHOUT LONG-TERM CURRENT USE OF INSULIN (HCC): Primary | ICD-10-CM

## 2025-01-21 PROCEDURE — 99214 OFFICE O/P EST MOD 30 MIN: CPT | Performed by: FAMILY MEDICINE

## 2025-01-21 RX ORDER — VARENICLINE TARTRATE 0.5 (11)-1
KIT ORAL
Qty: 53 EACH | Refills: 0 | Status: SHIPPED | OUTPATIENT
Start: 2025-01-21

## 2025-01-21 NOTE — PROGRESS NOTES
Assessment/Plan:   Diagnoses and all orders for this visit:    Type 2 diabetes mellitus with hyperglycemia, without long-term current use of insulin (HCC)  -     Hemoglobin A1C; Future  -     Comprehensive metabolic panel; Future  -     Lipid Panel with Direct LDL reflex; Future  -     TSH, 3rd generation with Free T4 reflex; Future  -     Albumin / creatinine urine ratio; Future  -     CBC and differential; Future  -     Diabetic foot exam; Future  -     Cancel: IRIS Diabetic eye exam  - follows with Endo - A1c = 5.0 - stable on Ozempic   - UTD with PCV20   - declined annual Flu vaccine in the office today   - DM foot exam as documented below   - RTO in 1month, with labs, for f/u, annual and IRIS eye exam - pt aware and agreeable   Influenza vaccination declined by patient    Hyperlipidemia LDL goal <70  - LDL (10/21/2024) = 143 (goal less than 70)   - currently on Crestor 10mg QHS but not taking Zetia   - will check labs  - The 10-year ASCVD risk score (Jose M MOBLEY, et al., 2019) is: 0.6%    Values used to calculate the score:      Age: 39 years      Sex: Female      Is Non- : Yes      Diabetic: Yes      Tobacco smoker: Yes      Systolic Blood Pressure: 110 mmHg      Is BP treated: No      HDL Cholesterol: 78 mg/dL      Total Cholesterol: 241 mg/dL    Encounter for smoking cessation counseling  Cigarette nicotine dependence with nicotine-induced disorder  -     Varenicline Tartrate, Starter, (Chantix Starting Month Pak) 0.5 MG X 11 & 1 MG X 42 TBPK; 0.5 mg once daily for 3 days then 0.5mg twice daily for days 4-7 then 1 mg twice daily          Subjective:    Patient ID: Gilma Montilla is a 39 y.o. female.  HPI  40yo F presents to the office for f/u   - LDL (10/21/2024) = 143 (goal less than 70)   - currently on Crestor 10mg QHS but not taking Zetia   - follows with Endo - last A1c = 5.0 - currently on Ozempic   - last week her leg went numb 2days after she got the nexplanon implanted but has  "since then resolved   - denies F/C/N/V/CP/palpitations/SOB/wheezing/abd pain/LE edema  - has been smoking \"a lot\" and would like Rx for Chantix   - declined annual Flu vaccine       The following portions of the patient's history were reviewed and updated as appropriate: allergies, current medications, past family history, past medical history, past social history, past surgical history and problem list.    Review of Systems  as per HPI    Objective:  /68   Pulse 73   Resp 16   Ht 5' 1\" (1.549 m)   Wt 61.7 kg (136 lb)   LMP 01/15/2025 (Exact Date)   SpO2 99%   BMI 25.70 kg/m²    Physical Exam  Vitals reviewed.   Constitutional:       General: She is not in acute distress.     Appearance: Normal appearance. She is not ill-appearing, toxic-appearing or diaphoretic.   HENT:      Head: Normocephalic and atraumatic.      Right Ear: External ear normal.      Left Ear: External ear normal.      Nose: Nose normal.   Eyes:      General: No scleral icterus.        Right eye: No discharge.         Left eye: No discharge.      Extraocular Movements: Extraocular movements intact.      Conjunctiva/sclera: Conjunctivae normal.   Cardiovascular:      Rate and Rhythm: Normal rate and regular rhythm.      Pulses: no weak pulses.           Dorsalis pedis pulses are 2+ on the right side and 2+ on the left side.      Heart sounds: Normal heart sounds.   Pulmonary:      Effort: Pulmonary effort is normal. No respiratory distress.      Breath sounds: Normal breath sounds. No stridor. No wheezing, rhonchi or rales.   Abdominal:      Palpations: Abdomen is soft.   Musculoskeletal:         General: Normal range of motion.      Cervical back: Normal range of motion.      Right lower leg: No edema.      Left lower leg: No edema.   Feet:      Right foot:      Skin integrity: No ulcer, skin breakdown, erythema, warmth, callus or dry skin.      Left foot:      Skin integrity: No ulcer, skin breakdown, erythema, warmth, callus or dry " skin.   Skin:     General: Skin is warm.   Neurological:      General: No focal deficit present.      Mental Status: She is alert and oriented to person, place, and time.   Psychiatric:         Mood and Affect: Mood normal.         Behavior: Behavior normal.         Patient's shoes and socks removed.    Right Foot/Ankle   Right Foot Inspection  Skin Exam: skin normal and skin intact. No dry skin, no warmth, no callus, no erythema, no maceration, no abnormal color, no pre-ulcer, no ulcer and no callus.     Toe Exam: ROM and strength within normal limits.     Sensory   Monofilament testing: intact    Vascular  The right DP pulse is 2+.     Left Foot/Ankle  Left Foot Inspection  Skin Exam: skin normal and skin intact. No dry skin, no warmth, no erythema, no maceration, normal color, no pre-ulcer, no ulcer and no callus.     Toe Exam: ROM and strength within normal limits.     Sensory   Monofilament testing: intact    Vascular  The left DP pulse is 2+.     Assign Risk Category  No deformity present  No loss of protective sensation  No weak pulses  Risk: 0

## 2025-02-04 ENCOUNTER — TELEMEDICINE (OUTPATIENT)
Dept: BEHAVIORAL/MENTAL HEALTH CLINIC | Facility: CLINIC | Age: 40
End: 2025-02-04
Payer: COMMERCIAL

## 2025-02-04 DIAGNOSIS — F33.1 MODERATE EPISODE OF RECURRENT MAJOR DEPRESSIVE DISORDER (HCC): Primary | ICD-10-CM

## 2025-02-04 DIAGNOSIS — F41.1 GAD (GENERALIZED ANXIETY DISORDER): ICD-10-CM

## 2025-02-04 PROCEDURE — 90837 PSYTX W PT 60 MINUTES: CPT | Performed by: SOCIAL WORKER
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
## 2025-02-14 ENCOUNTER — DOCUMENTATION (OUTPATIENT)
Dept: BEHAVIORAL/MENTAL HEALTH CLINIC | Facility: CLINIC | Age: 40
End: 2025-02-14

## 2025-02-14 DIAGNOSIS — F33.1 MODERATE EPISODE OF RECURRENT MAJOR DEPRESSIVE DISORDER (HCC): Primary | ICD-10-CM

## 2025-02-14 DIAGNOSIS — F41.1 GAD (GENERALIZED ANXIETY DISORDER): ICD-10-CM

## 2025-02-14 NOTE — PROGRESS NOTES
"TRANSFER SUMMARY    [facility]    Patient Name Gilma Montilla     Date of Birth: 39 y.o. 1985      MRN: [id number]    Admission Date:  2/4/25    Date of Transfer: February 14, 2025    Admission Diagnosis:     Major Depressive Disorder, recurrent, moderate  Generalized Anxiety Disorder    Current Diagnosis:     1. Moderate episode of recurrent major depressive disorder (HCC)        2. STELLA (generalized anxiety disorder)            Reason for Admission: Gilma presented for treatment due to depression and anxiety. Primary complaints included DEPRESSIVE SYMPTOMS: depressed mood, sadness, low energy, irritability, passive death wish, difficulty sleeping and ANXIETY SYMPTOMS: daily anxiety symptoms, feeling nervous, worrying daily, muscle tension, restlessness, chest tightness, shortness of breath, dizziness, tremulousness, chills and hot flashes . See intake \"presenting problem\" for further information.     Progress in Treatment: Gilma was seen for intake only.    Episodes of Higher Level of Care: No    Transfer request Initiated by: Therapist: Jenna Ron [facility]    Reason for Transfer Request:  clinician/client request given lack of [facility] follow up availability at this time    Does this individual need a clinician with specialized training/expertise?: No    Is this client working with any other [facility] Providers/Therapists? Psychiatrist: None Therapist: None    Other pertinent issues: None    Are there any specific individuals who would be a “best fit” or who have already agreed to accept this transfer request?      Psychiatrist: N/A  Therapist: None - consider Therapy Anywhere given preference for virtual appointments, spouse works for [employer]  Rationale:  See above    Attempts to maintain the current therapeutic relationship: Not Applicable    Transfer request routed to Clinical Supervisor and TA staff for input and/or approval. Coordinated with TA staff also " via in-basket regarding transfer.    Jenna Ron, LCSW02/14/25

## 2025-02-24 ENCOUNTER — TELEPHONE (OUTPATIENT)
Dept: PSYCHIATRY | Facility: CLINIC | Age: 40
End: 2025-02-24

## 2025-02-24 NOTE — TELEPHONE ENCOUNTER
Per provider's request, called patient to R/S BINTA appointment with Bessy Ramos. Left detailed message requesting a call back. Left TA number.

## 2025-03-15 ENCOUNTER — APPOINTMENT (OUTPATIENT)
Dept: LAB | Facility: CLINIC | Age: 40
End: 2025-03-15
Payer: COMMERCIAL

## 2025-03-15 DIAGNOSIS — E78.5 HYPERLIPIDEMIA LDL GOAL <70: ICD-10-CM

## 2025-03-15 DIAGNOSIS — E11.65 TYPE 2 DIABETES MELLITUS WITH HYPERGLYCEMIA, WITHOUT LONG-TERM CURRENT USE OF INSULIN (HCC): ICD-10-CM

## 2025-03-31 ENCOUNTER — NURSE TRIAGE (OUTPATIENT)
Age: 40
End: 2025-03-31

## 2025-03-31 NOTE — TELEPHONE ENCOUNTER
"FOLLOW UP: offered appointment, patient prefers msg to provider.    REASON FOR CONVERSATION: Vaginal Bleeding    SYMPTOMS: had Nexplanon inserted 1/15, March 8th started bleeding. Irregular spotting at first, then changed to period type bleeding. Patient denies cramping, no pain, not soaking pads, no clots. Advised bleeding precautions and when to call back, patient verbalzied understanding.    OTHER: Reviewed can take her body some time to adjust to hormones and many patient have irregular bleeding as body adjusts,  patient verbalzied understanding and asked for inbasket msg to be sent to provider to review    DISPOSITION: Discuss with Provider and Call Back Patient, See Within 2 Weeks in Office  Patient asked for msg to be sent to provider Reason for Disposition   Periods last > 7 days    Answer Assessment - Initial Assessment Questions  1. BLEEDING SEVERITY: \"Describe the bleeding that you are having.\" \"How much bleeding is there?\"       Nexplanon Darell 1/15/25. March 8th started w/ spotting only this changed to period type bleeding March 15th, on 20th it was heavier and now again like her regular period.  No clots, not soaking pads, no pain.    2. ONSET: \"When did the bleeding begin?\" \"Is it continuing now?\"      3/8  3. MENSTRUAL PERIOD: \"When was the last normal menstrual period?\" \"How is this different than your period?\"      1/15/25   4. REGULARITY: \"How regular are your periods?\"      Regular periods before Nexplanon, about every 24 days, bleed x 5 days   5. ABDOMEN PAIN: \"Do you have any pain?\" \"How bad is the pain?\"  (e.g., Scale 0-10; none, mild, moderate, or severe)      Denies   6. PREGNANCY: \"Is there any chance you are pregnant?\" \"When was your last menstrual period?\"      Denies   7. BREASTFEEDING: \"Are you breastfeeding?\"      Denies   8. HORMONE MEDICINES: \"Are you taking any hormone medicines, prescription or over-the-counter?\" (e.g., birth control pills, estrogen)      Nexplanon   9. BLOOD THINNER " "MEDICINES: \"Do you take any blood thinners?\" (e.g., Coumadin / warfarin, Pradaxa / dabigatran, aspirin)      Denies   10. CAUSE: \"What do you think is causing the bleeding?\" (e.g., recent gyn surgery, recent gyn procedure; known bleeding disorder, cervical cancer, polycystic ovarian disease, fibroids)          Tired b/c she didn't sleep last night   11. HEMODYNAMIC STATUS: \"Are you weak or feeling lightheaded?\" If Yes, ask: \"Can you stand and walk normally?\"         Nexplanon   12. OTHER SYMPTOMS: \"What other symptoms are you having with the bleeding?\" (e.g., passed tissue, vaginal discharge, fever, menstrual-type cramps)        Denies    Protocols used: Vaginal Bleeding - Abnormal-Adult-[place]    "

## 2025-04-01 ENCOUNTER — TELEPHONE (OUTPATIENT)
Dept: PSYCHIATRY | Facility: CLINIC | Age: 40
End: 2025-04-01

## 2025-04-01 ENCOUNTER — TELEMEDICINE (OUTPATIENT)
Dept: PSYCHIATRY | Facility: CLINIC | Age: 40
End: 2025-04-01
Payer: COMMERCIAL

## 2025-04-01 DIAGNOSIS — F41.1 GAD (GENERALIZED ANXIETY DISORDER): ICD-10-CM

## 2025-04-01 DIAGNOSIS — F33.1 MODERATE EPISODE OF RECURRENT MAJOR DEPRESSIVE DISORDER (HCC): Primary | ICD-10-CM

## 2025-04-01 PROCEDURE — 99214 OFFICE O/P EST MOD 30 MIN: CPT | Performed by: NURSE PRACTITIONER

## 2025-04-01 RX ORDER — ESCITALOPRAM OXALATE 10 MG/1
10 TABLET ORAL DAILY
Qty: 30 TABLET | Refills: 1 | Status: SHIPPED | OUTPATIENT
Start: 2025-04-01

## 2025-04-01 RX ORDER — HYDROXYZINE HYDROCHLORIDE 25 MG/1
25 TABLET, FILM COATED ORAL EVERY 6 HOURS PRN
Qty: 30 TABLET | Refills: 1 | Status: SHIPPED | OUTPATIENT
Start: 2025-04-01

## 2025-04-01 RX ORDER — HYDROXYZINE HYDROCHLORIDE 25 MG/1
25 TABLET, FILM COATED ORAL EVERY 6 HOURS PRN
Qty: 30 TABLET | Refills: 1 | Status: SHIPPED | OUTPATIENT
Start: 2025-04-01 | End: 2025-04-01 | Stop reason: SDUPTHER

## 2025-04-01 NOTE — PSYCH
MEDICATION MANAGEMENT NOTE    Name: Gilma Montilla      : 1985      MRN: [id number]  Encounter Provider: RASHEED Cruz  Encounter Date: 2025   Encounter department: [facility]    Insurance: Payor: [employer] / Plan: [facility] NETWORK / Product Type: [facility] /      Reason for Visit:   Chief Complaint   Patient presents with    Anxiety    Medication Management    Follow-up   :  Assessment & Plan  Moderate episode of recurrent major depressive disorder (HCC)    Orders:    escitalopram (LEXAPRO) 10 mg tablet; Take 1 tablet (10 mg total) by mouth daily    STELLA (generalized anxiety disorder)    Orders:    hydrOXYzine HCL (ATARAX) 25 mg tablet; Take 1 tablet (25 mg total) by mouth every 6 (six) hours as needed for anxiety    The patient will continue on:  Restart Lexapro 10 mg daily  Add Atarax 25 mg every 6 hours as needed for anxiety    Treatment Recommendations:    Educated about diagnosis and treatment modalities. Verbalizes understanding and agreement with the treatment plan.  Discussed self monitoring of symptoms, and symptom monitoring tools.  Discussed medications and if treatment adjustment was needed or desired.  Aware of 24 hour and weekend coverage for urgent situations accessed by calling [facility] main practice number  I am scheduling this patient out for greater than 3 months: No    This patient was last seen 2024.   She was then contacted again by our office in November to reschedule an appointment and never called back.  She again was contacted by our division in February to schedule an appointment with a new talk therapist, was left a detailed message to call back and never did.  She has been seen today by me and we did start Lexapro and hydroxyzine and I am asking her to schedule with me in 2 weeks to be seen.  I gave her the phone number to contact our division to set up that appointment.  I am also  asking our office staff to give her a call today to schedule an appointment for 2 weeks from now.  Hopefully, she will give us a call to schedule that appointment.    On examination today, the patient tells me she has been without medication for quite some time.  She was last seen, as noted above, in October 2024.  She tells me that she continues to experience episodes of anxiety and she continues to experience episodes of depression.  She has 2 daughters that suffer from autism spectrum disorder.  She has another daughter who does not suffer from the disorder.  Understandably, she is a very busy mother with a lot of appointments to keep.  As a result, she is reporting continued episodes of anxiety and mild symptoms of depression.  I took the time today to discuss with the patient the need for her to continuing treatment if she is still experiencing symptoms.  I have asked her to see us in 2 weeks or sooner if necessary and we will start her back on low-dose Lexapro and low-dose hydroxyzine to help with the anxiety.  She agrees to the treatment plan.  As noted above, we have contacted her and she has not contacted us back to set up another appointment.  Hopefully, she will.    Medications Risks/Benefits:      Risks, Benefits And Possible Side Effects Of Medications:    Risks, benefits, and possible side effects of medications explained to Gilma and she (or legal representative) verbalizes understanding and agreement for treatment.    Controlled Medication Discussion:     Not applicable      History of Present Illness     CC: Gilma presents today for follow up on 0 4/01/2025.  Patient was last seen in October 2024.  She would like to establish care again.     Gilma presents today after not being seen for 7 months.  She was supposed to be seen 2 weeks after her last appointment in October but never scheduled.  As a result, she has been off all of her medications.  As noted above, we are going to restart  Lexapro 10 mg daily and I am giving her hydroxyzine 25 mg every 6 hours as needed to help with anxiety.  I want to see her again in 2 weeks.  She has also been given a phone call by our division to schedule individual therapy and that was back in February.  She has yet to give them a call.  I encouraged her to do so.  Follow-up in 2 weeks or sooner if necessary.    Med Compliance: No.  Patient was seen in October 2024 and never rescheduled till now.  She has been off all medications    Since our last visit, overall symptoms have been  asking to start treatment again. .     HPI ROS:     Medication Side Effects: None  Depression: 5 /10 (10 worst)  Anxiety: 5 /10 (10 worst)  Safety concerns (SI, HI, others): None  Sleep: Variable  Energy: Fluctuating  Appetite: Good  Weight Change: No complaints of weight change    Sameerah  never reported any side effects to the medication she was prescribed in the past.    Review Of Systems: A review of systems is obtained and is negative except for the pertinent positives listed in HPI/Subjective above.      Current Rating Scores:     Current PHQ-9 is 12.    Areas of Improvement: reviewed in HPI/Subjective Section and reviewed in Assessment and Plan Section      Past Medical History:   Diagnosis Date    Acid reflux     Allergic     seasonal    Anxiety     Blurred vision     Diabetes mellitus (HCC)     Fainting     Headache     Irregular heart beat     Palpitations    Loss of appetite     Obesity     Spontaneous vaginal delivery     x3    Weakness         Past Surgical History:   Procedure Laterality Date    FL RETROGRADE PYELOGRAM  1/4/2020    FL RETROGRADE PYELOGRAM  1/22/2020    NO PAST SURGERIES      WA CYSTO/URETERO W/LITHOTRIPSY &INDWELL STENT INSRT Left 1/4/2020    Procedure: CYSTOSCOPY URETEROSCOPY , RETROGRADE PYELOGRAM AND INSERTION STENT URETERAL;  Surgeon: Tyler Atwood MD;  Location: [facility];  Service: Urology    WA CYSTO/URETERO W/LITHOTRIPSY &INDWELL STENT  INSRT Left 1/22/2020    Procedure: CYSTOSCOPY URETEROSCOPY WITH LITHOTRIPSY HOLMIUM LASER, RETROGRADE PYELOGRAM AND INSERTION STENT URETERAL;  Surgeon: Sarthak Tay MD;  Location: [facility];  Service: Urology     Allergies:   Allergies   Allergen Reactions    Other Itching     apples       Current Outpatient Medications   Medication Sig Dispense Refill    escitalopram (LEXAPRO) 10 mg tablet Take 1 tablet (10 mg total) by mouth daily 30 tablet 1    hydrOXYzine HCL (ATARAX) 25 mg tablet Take 1 tablet (25 mg total) by mouth every 6 (six) hours as needed for itching 30 tablet 1    ezetimibe (ZETIA) 10 mg tablet Take 1 tablet (10 mg total) by mouth daily (Patient not taking: Reported on 1/13/2025) 90 tablet 1    rosuvastatin (CRESTOR) 10 MG tablet Take 1 tablet (10 mg total) by mouth daily at bedtime 90 tablet 1    semaglutide, 2 mg/dose, (Ozempic, 2 MG/DOSE,) 8 mg/ mL injection pen Inject 0.75 mL (2 mg total) under the skin every 7 days 9 mL 1    Varenicline Tartrate, Starter, (Chantix Starting Month Xavi) 0.5 MG X 11 & 1 MG X 42 TBPK 0.5 mg once daily for 3 days then 0.5mg twice daily for days 4-7 then 1 mg twice daily 53 each 0     Current Facility-Administered Medications   Medication Dose Route Frequency Provider Last Rate Last Admin    etonogestrel (NEXPLANON) subdermal implant 68 mg  68 mg Subdermal Once every 3 years    68 mg at 01/15/25 1130       Substance Abuse History:    Social History     Substance and Sexual Activity   Alcohol Use Yes    Comment: monthly     Social History     Substance and Sexual Activity   Drug Use Never       Social History:    Social History     Socioeconomic History    Marital status: /Civil Union     Spouse name: Not on file    Number of children: Not on file    Years of education: Not on file    Highest education level: Not on file   Occupational History    Occupation: Housewife or Homemaker   Tobacco Use    Smoking status: Some Days     Current packs/day: 0.25      Average packs/day: 0.3 packs/day for 20.0 years (5.0 ttl pk-yrs)     Types: Cigarettes    Smokeless tobacco: Never    Tobacco comments:     currently 7cigs/day   Vaping Use    Vaping status: Never Used   Substance and Sexual Activity    Alcohol use: Yes     Comment: monthly    Drug use: Never    Sexual activity: Yes     Partners: Male     Birth control/protection: None     Comment: declined STD screening    Other Topics Concern    Not on file   Social History Narrative    Always uses seat belt    Caffeine use    [employer]ism (disciples of [employer])     Social Drivers of Health     Financial Resource Strain: Not on file   Food Insecurity: Not on file   Transportation Needs: Not on file   Physical Activity: Not on file   Stress: Not on file   Social Connections: Not on file   Intimate Partner Violence: Not on file   Housing Stability: Not on file       Family Psychiatric History:     Family History   Problem Relation Age of Onset    Hypertension Mother     Anemia Father     Leukemia Sister     Fibroids Sister     No Known Problems Sister     No Known Problems Sister     Breast cancer Maternal Grandmother 45    Diabetes Maternal Grandmother     Diabetes Paternal Grandmother     No Known Problems Brother     Multiple sclerosis Cousin     Thyroid disease Other     Autism Other     Colon cancer Neg Hx     Cervical cancer Neg Hx     Stroke Neg Hx        Medical History Reviewed by provider this encounter:  Tobacco  Allergies  Meds  Problems  Med Hx  Surg Hx  Fam Hx          Objective   There were no vitals taken for this visit.     Mental Status Evaluation:    Appearance age appropriate, casually dressed, dressed appropriately   Behavior cooperative, appears anxious   Speech normal rate, normal volume, normal pitch, spontaneous   Mood depressed, anxious   Affect normal range and intensity, appropriate   Thought Processes organized, goal directed, linear   Thought Content no overt delusions   Perceptual  Disturbances: no auditory hallucinations, no visual hallucinations   Abnormal Thoughts  Risk Potential Suicidal ideation - None  Homicidal ideation - None  Potential for aggression - No   Orientation oriented to person, place, time/date, and situation   Memory recent and remote memory grossly intact   Consciousness alert and awake   Attention Span Concentration Span attention span and concentration are age appropriate   Intellect appears to be of average intelligence   Insight intact   Judgement intact   Muscle Strength and  Gait normal muscle strength and normal muscle tone, normal gait and normal balance   Motor activity no abnormal movements   Language no difficulty naming common objects, no difficulty repeating a phrase, no difficulty writing a sentence   Fund of Knowledge adequate knowledge of current events  adequate fund of knowledge regarding past history  adequate fund of knowledge regarding vocabulary    Pain none   Pain Scale 0       Laboratory Results: I have personally reviewed all pertinent laboratory/tests results    Recent Labs (last 6 months):   Procedure visit on 01/15/2025   Component Date Value    URINE HCG 01/15/2025 negative    Office Visit on 01/13/2025   Component Date Value    Case Report 01/13/2025                      Value:Gynecologic Cytology Report                       Case: [id number]                                  Authorizing Provider:  Gisele Beach              Collected:           01/13/2025 1048                                     LINDA Franklin                                                           Ordering Location:     [facility]  Received:            01/13/2025 1049                                     Health                                                                       First Screen:          Madhavi Conti, CT                                                       Specimen:    LIQUID-BASED PAP, SCREENING, Cervix, Endocervical                                           Primary Interpretation 01/13/2025 Negative for intraepithelial lesion or malignancy     Specimen Adequacy 01/13/2025 Satisfactory for evaluation. Absence of endocervical/transformation zone component.     Note 01/13/2025                      Value:Screening performed at [facility], [address] [address].        Additional Information 01/13/2025                      Value:[facility]'s FDA approved ,  and ThinPrep Imaging Duo System are utilized with strict adherence to the 's instruction manual to prepare gynecologic and non-gynecologic cytology specimens for the production of ThinPrep slides as well as for gynecologic ThinPrep imaging. These processes have been validated by our laboratory and/or by the .  The Pap test is not a diagnostic procedure and should not be used as the sole means to detect cervical cancer. It is only a screening procedure to aid in the detection of cervical cancer and its precursors. Both false-negative and false-positive results have been experienced. Your patient's test result should be interpreted in this context together with the history and clinical findings.      Gross Description 01/13/2025                      Value:20 ml , colorless, cloudy received in a ThinPrep vial.      LMP 01/13/2025 12/13/2024     HPV Other HR 01/13/2025 Negative     HPV16 01/13/2025 Negative     HPV18 01/13/2025 Negative    Appointment on 10/12/2024   Component Date Value    Hemoglobin A1C 10/12/2024 5.0     EAG 10/12/2024 97     Sodium 10/12/2024 137     Potassium 10/12/2024 3.6     Chloride 10/12/2024 104     CO2 10/12/2024 28     ANION GAP 10/12/2024 5     BUN 10/12/2024 7     Creatinine 10/12/2024 0.64     Glucose, Fasting 10/12/2024 87     Calcium 10/12/2024 9.1     eGFR 10/12/2024 111     Creatinine, Ur 10/12/2024 198.7     Albumin,U,Random 10/12/2024 19.3     Albumin Creat Ratio 10/12/2024 10     Vit D, 25-Hydroxy  10/12/2024 22.4 (L)     Cholesterol 10/12/2024 241 (H)     Triglycerides 10/12/2024 76     HDL, Direct 10/12/2024 78     LDL Calculated 10/12/2024 148 (H)     Non-HDL-Chol (CHOL-HDL) 10/12/2024 163        Suicide/Homicide Risk Assessment:    Risk of Harm to Self:  Based on today's assessment, Gilma presents the following risk of harm to self: none    Risk of Harm to Others:  Based on today's assessment, Gilma presents the following risk of harm to others: none    The following interventions are recommended:  Restart Lexapro at 10 mg daily and start hydroxyzine 25 mg every 6 hours as needed for anxiety.  Patient is to follow-up in 2 weeks Continue medication management.    Psychotherapy Provided:     Individual psychotherapy provided: No    Treatment Plan:    Completed and signed during the session: Yes - with Gilma    Goals: Progress towards Treatment Plan goals - Yes, progressing, as evidenced by subjective findings in HPI/Subjective Section and in Assessment and Plan Section    Depression Follow-up Plan Completed: Yes    Note Share:    This note was shared with patient.    Administrative Statements   Administrative Statements   Encounter provider RASHEED Cruz    The Patient is located at Home and in the following state in which I hold an active license [place].    The patient was identified by name and date of birth. Gilma Montilla was informed that this is a telemedicine visit and that the visit is being conducted through the Epic Embedded platform. She agrees to proceed..  My office door was closed. No one else was in the room.  She acknowledged consent and understanding of privacy and security of the video platform. The patient has agreed to participate and understands they can discontinue the visit at any time.    I have spent a total time of 30 minutes in caring for this patient on the day of the visit/encounter including Counseling / Coordination of care, not including the time spent for  establishing the audio/video connection.    Visit Time  Visit Start Time: 8:30AM   Visit Stop Time: 9:00AM   Total Visit Duration:  30 minutes    RASHEED Cruz 04/01/25

## 2025-04-01 NOTE — PSYCH
TREATMENT PLAN (Medication Management Only)        [facility]    Name and Date of Birth:  Gilma Montilla 39 y.o. 1985  Date of Treatment Plan: April 1, 2025  Diagnosis/Diagnoses:    1. Moderate episode of recurrent major depressive disorder (HCC)    2. STELLA (generalized anxiety disorder)      Strengths/Personal Resources for Self-Care: supportive family, supportive friends.  Area/Areas of need (in own words): anxiety symptoms, depressive symptoms.  1. Long Term Goal: alleviate depression.   Target Date: 1 year - 4/1/2026  Person/Persons responsible for completion of goal: RASHEED Gan.  2.  Short Term Objective (s) - How will we reach this goal?:   A.  Provider new recommended medication/dosage changes and/or continue medication(s): continue current medications as prescribed.  B.  N/A.    Target Date: 3 months - 7/1/2025  Person/Persons Responsible for Completion of Goal: RASHEED Gan  Progress Towards Goals: Continuing Treatment  Treatment Modality: medication management every 3 months  Review due 6 months from date of this plan: 6 months - 10/1/2025  Expected length of service: maintenance unless revised  My Physician/PA/NP and I have developed this plan together and I agree to work on the goals and objectives. I understand the treatment goals that were developed for my treatment.

## 2025-04-01 NOTE — TELEPHONE ENCOUNTER
Patient called in to schedule their 2 week medication management follow up.    Patient is now scheduled on 4/17 @10:30am virtually.

## 2025-04-17 ENCOUNTER — TELEMEDICINE (OUTPATIENT)
Dept: PSYCHIATRY | Facility: CLINIC | Age: 40
End: 2025-04-17
Payer: COMMERCIAL

## 2025-04-17 DIAGNOSIS — F41.1 GAD (GENERALIZED ANXIETY DISORDER): ICD-10-CM

## 2025-04-17 DIAGNOSIS — F33.1 MODERATE EPISODE OF RECURRENT MAJOR DEPRESSIVE DISORDER (HCC): Primary | ICD-10-CM

## 2025-04-17 PROCEDURE — 99214 OFFICE O/P EST MOD 30 MIN: CPT | Performed by: NURSE PRACTITIONER

## 2025-04-17 RX ORDER — HYDROXYZINE HYDROCHLORIDE 25 MG/1
TABLET, FILM COATED ORAL
Start: 2025-04-17

## 2025-04-17 RX ORDER — LORAZEPAM 0.5 MG/1
TABLET ORAL
Qty: 60 TABLET | Refills: 1 | Status: SHIPPED | OUTPATIENT
Start: 2025-04-17

## 2025-04-17 RX ORDER — ESCITALOPRAM OXALATE 10 MG/1
10 TABLET ORAL DAILY
Qty: 30 TABLET | Refills: 1 | Status: SHIPPED | OUTPATIENT
Start: 2025-04-17

## 2025-04-17 NOTE — PSYCH
MEDICATION MANAGEMENT NOTE    Name: Gilma Montilla      : 1985      MRN: [id number]  Encounter Provider: RASHEED Cruz  Encounter Date: 2025   Encounter department: [facility]    Insurance: Payor: [employer] / Plan: [facility] NETWORK / Product Type: [facility] /      Reason for Visit:   Chief Complaint   Patient presents with    Anxiety    Medication Management    Follow-up   :  Assessment & Plan  STELLA (generalized anxiety disorder)    Orders:    hydrOXYzine HCL (ATARAX) 25 mg tablet; 1 tab HS PRN for sleep    LORazepam (Ativan) 0.5 mg tablet; 1 tab BID PRN for anxiety    Moderate episode of recurrent major depressive disorder (HCC)    Orders:    escitalopram (Lexapro) 10 mg tablet; Take 1 tablet (10 mg total) by mouth daily    Patient will continue on:  Start Lexapro 10 mg daily  Start lorazepam 0.5 mg twice daily as needed for severe anxiety  Hydroxyzine 25 mg at bedtime as needed to help with sleep  Follow-up with me in 4 to 8 weeks or sooner if necessary.    Treatment Recommendations:    Educated about diagnosis and treatment modalities. Verbalizes understanding and agreement with the treatment plan.  Discussed self monitoring of symptoms, and symptom monitoring tools.  Discussed medications and if treatment adjustment was needed or desired.  Aware of 24 hour and weekend coverage for urgent situations accessed by calling [facility] main practice number  I am scheduling this patient out for greater than 3 months: No    Medications Risks/Benefits:      Risks, Benefits And Possible Side Effects Of Medications:    Risks, benefits, and possible side effects of medications explained to Gilma and she (or legal representative) verbalizes understanding and agreement for treatment.    Controlled Medication Discussion:     Gilma has been filling controlled prescriptions on time as prescribed according to [place]  Prescription Drug Monitoring Program.      History of Present Illness     CC: Gilma presents today for follow up on 0/17/2025 for treatment of major depressive disorder and anxiety disorder.     Gilma presents today very overwhelmed.  She has 2 daughters that suffer from autistic disorder and another daughter who can be quite demanding of her attention at times.  She is overwhelmed.  She has a lot of doctors appointments and in-home therapy appointments and mobile therapy appointments and in person therapy appointments for her daughters.  They are also very demanding over time.  She lacks the ability to take time for herself.  For example, she was to receive a individual therapist but she never made the call back.  I am going to attempt to see if we can get her therapist .  I will email and see what we can do.  She never started the escitalopram so we are getting it that started today and I will give her low-dose Ativan that she can take twice a day if she needs it for her severe anxiety.  She will also use the hydroxyzine for sleep.  We will follow-up with a phone call in 2 weeks or sooner if necessary and then schedule an appointment for June.    Med Compliance: yes    Since our last visit, overall symptoms have been gradually worsening.  As noted above, reports more stress and anxiety at home.  We have adjusted medications accordingly.    HPI ROS:     Medication Side Effects: None  Depression: 5 /10 (10 worst)  Anxiety: 5 /10 (10 worst)  Safety concerns (SI, HI, others): None  Sleep: Fluctuating sleep pattern  Energy: Adequate energy level  Appetite: Adequate  Weight Change: No reports of any weight changes or adjustments    Gilma denies any side effects from medications unless noted above.    Review Of Systems: A review of systems is obtained and is negative except for the pertinent positives listed in HPI/Subjective above.      Current Rating Scores:     Current PHQ-9 is 12.    Areas of Improvement:  reviewed in HPI/Subjective Section and reviewed in Assessment and Plan Section    Past Medical History:   Diagnosis Date    Acid reflux     Allergic     seasonal    Anxiety     Blurred vision     Diabetes mellitus (HCC)     Fainting     Headache     Irregular heart beat     Palpitations    Loss of appetite     Obesity     Spontaneous vaginal delivery     x3    Weakness      Past Surgical History:   Procedure Laterality Date    FL RETROGRADE PYELOGRAM  1/4/2020    FL RETROGRADE PYELOGRAM  1/22/2020    NO PAST SURGERIES      KS CYSTO/URETERO W/LITHOTRIPSY &INDWELL STENT INSRT Left 1/4/2020    Procedure: CYSTOSCOPY URETEROSCOPY , RETROGRADE PYELOGRAM AND INSERTION STENT URETERAL;  Surgeon: Tyler Atwood MD;  Location: [facility];  Service: Urology    KS CYSTO/URETERO W/LITHOTRIPSY &INDWELL STENT INSRT Left 1/22/2020    Procedure: CYSTOSCOPY URETEROSCOPY WITH LITHOTRIPSY HOLMIUM LASER, RETROGRADE PYELOGRAM AND INSERTION STENT URETERAL;  Surgeon: Sarthak Tay MD;  Location: [facility];  Service: Urology     Allergies:   Allergies   Allergen Reactions    Other Itching     apples       Current Outpatient Medications   Medication Instructions    escitalopram (LEXAPRO) 10 mg, Oral, Daily    escitalopram (LEXAPRO) 10 mg, Oral, Daily    ezetimibe (ZETIA) 10 mg, Oral, Daily    hydrOXYzine HCL (ATARAX) 25 mg tablet 1 tab HS PRN for sleep    LORazepam (Ativan) 0.5 mg tablet 1 tab BID PRN for anxiety    rosuvastatin (CRESTOR) 10 mg, Oral, Daily at bedtime    semaglutide (2 mg/dose) (OZEMPIC (2 MG/DOSE)) 2 mg, Subcutaneous, Every 7 days    Varenicline Tartrate, Starter, (Chantix Starting Month Pak) 0.5 MG X 11 & 1 MG X 42 TBPK 0.5 mg once daily for 3 days then 0.5mg twice daily for days 4-7 then 1 mg twice daily        Substance Abuse History:    Tobacco, Alcohol and Drug Use History     Tobacco Use    Smoking status: Some Days     Current packs/day: 0.25     Average packs/day: 0.3 packs/day for 20.0 years (5.0 ttl  pk-yrs)     Types: Cigarettes    Smokeless tobacco: Never    Tobacco comments:     currently 7cigs/day   Vaping Use    Vaping status: Never Used   Substance Use Topics    Alcohol use: Yes     Comment: monthly    Drug use: Never     Alcohol Use: Unknown (6/7/2021)    AUDIT-C     Frequency of Alcohol Consumption: Monthly or less     Average Number of Drinks: 1 or 2       Social History:    Social History     Socioeconomic History    Marital status: /Civil Union     Spouse name: Not on file    Number of children: Not on file    Years of education: Not on file    Highest education level: Not on file   Occupational History    Occupation: Housewife or Homemaker   Other Topics Concern    Not on file   Social History Narrative    Always uses seat belt    Caffeine use    [employer] (disciples of [employer])        Family Psychiatric History:     Family History   Problem Relation Age of Onset    Hypertension Mother     Anemia Father     Leukemia Sister     Fibroids Sister     No Known Problems Sister     No Known Problems Sister     Breast cancer Maternal Grandmother 45    Diabetes Maternal Grandmother     Diabetes Paternal Grandmother     No Known Problems Brother     Multiple sclerosis Cousin     Thyroid disease Other     Autism Other     Colon cancer Neg Hx     Cervical cancer Neg Hx     Stroke Neg Hx        Medical History Reviewed by provider this encounter:  Tobacco  Allergies  Meds  Problems  Med Hx  Surg Hx  Fam Hx          Objective   There were no vitals taken for this visit.     Mental Status Evaluation:    Appearance age appropriate, casually dressed, dressed appropriately   Behavior cooperative, appears anxious   Speech normal rate, normal volume, normal pitch, spontaneous   Mood depressed, anxious   Affect normal range and intensity, appropriate, reactive   Thought Processes tangential   Thought Content no overt delusions   Perceptual Disturbances: no auditory hallucinations, no visual  hallucinations   Abnormal Thoughts  Risk Potential Suicidal ideation - None  Homicidal ideation - None  Potential for aggression - No   Orientation oriented to person, place, time/date, and situation   Memory recent and remote memory grossly intact   Consciousness alert and awake   Attention Span Concentration Span attention span and concentration are age appropriate   Intellect appears to be of average intelligence   Insight fair   Judgement fair   Muscle Strength and  Gait normal muscle strength and normal muscle tone, normal gait and normal balance   Motor activity no abnormal movements   Language no difficulty naming common objects, no difficulty repeating a phrase, no difficulty writing a sentence   Fund of Knowledge adequate knowledge of current events  adequate fund of knowledge regarding past history  adequate fund of knowledge regarding vocabulary    Pain none   Pain Scale 0       Laboratory Results: I have personally reviewed all pertinent laboratory/tests results    Recent Labs (last 6 months):   Procedure visit on 01/15/2025   Component Date Value    URINE HCG 01/15/2025 negative    Office Visit on 01/13/2025   Component Date Value    Case Report 01/13/2025                      Value:Gynecologic Cytology Report                       Case: [id number]                                  Authorizing Provider:  Gisele Beach              Collected:           01/13/2025 1048                                     LINDA Franklin                                                           Ordering Location:     [facility]  Received:            01/13/2025 1049                                     Health                                                                       First Screen:          Madhavi Conti, CT                                                       Specimen:    LIQUID-BASED PAP, SCREENING, Cervix, Endocervical                                          Primary Interpretation  01/13/2025 Negative for intraepithelial lesion or malignancy     Specimen Adequacy 01/13/2025 Satisfactory for evaluation. Absence of endocervical/transformation zone component.     Note 01/13/2025                      Value:Screening performed at [facility], [address] [address].        Additional Information 01/13/2025                      Value:[employer]'s FDA approved ,  and ThinPrep Imaging Duo System are utilized with strict adherence to the 's instruction manual to prepare gynecologic and non-gynecologic cytology specimens for the production of ThinPrep slides as well as for gynecologic ThinPrep imaging. These processes have been validated by our laboratory and/or by the .  The Pap test is not a diagnostic procedure and should not be used as the sole means to detect cervical cancer. It is only a screening procedure to aid in the detection of cervical cancer and its precursors. Both false-negative and false-positive results have been experienced. Your patient's test result should be interpreted in this context together with the history and clinical findings.      Gross Description 01/13/2025                      Value:20 ml , colorless, cloudy received in a ThinPrep vial.      LMP 01/13/2025 12/13/2024     HPV Other HR 01/13/2025 Negative     HPV16 01/13/2025 Negative     HPV18 01/13/2025 Negative        Suicide/Homicide Risk Assessment:    Risk of Harm to Self:  Based on today's assessment, Sameerah presents the following risk of harm to self: none    Risk of Harm to Others:  Based on today's assessment, Sameerah presents the following risk of harm to others: none    The following interventions are recommended:  Start Lexapro and lorazepam.  Also refer patient again for individual therapy.    Psychotherapy Provided:     Individual psychotherapy provided: No    Treatment Plan:    Completed and signed during the session: Not applicable - Treatment Plan  not due at this session.    Goals: Progress towards Treatment Plan goals - Yes, progressing, as evidenced by subjective findings in HPI/Subjective Section and in Assessment and Plan Section    Depression Follow-up Plan Completed: Yes    Note Share:    This note was shared with patient.    Administrative Statements   Administrative Statements   Encounter provider RASHEED Cruz    The Patient is located at Home and in the following state in which I hold an active license [place].    The patient was identified by name and date of birth. Gilma Montilla was informed that this is a telemedicine visit and that the visit is being conducted through the Epic Embedded platform. She agrees to proceed..  My office door was closed. No one else was in the room.  She acknowledged consent and understanding of privacy and security of the video platform. The patient has agreed to participate and understands they can discontinue the visit at any time.    I have spent a total time of 30 minutes in caring for this patient on the day of the visit/encounter including Counseling / Coordination of care, not including the time spent for establishing the audio/video connection.    Visit Time  Visit Start Time: 10:30AM  Visit Stop Time: 11:00AM  Total Visit Duration:  30 minutes    RASHEED Cruz 04/17/25

## 2025-04-17 NOTE — ASSESSMENT & PLAN NOTE
Orders:    hydrOXYzine HCL (ATARAX) 25 mg tablet; 1 tab HS PRN for sleep    LORazepam (Ativan) 0.5 mg tablet; 1 tab BID PRN for anxiety

## 2025-04-18 ENCOUNTER — TELEPHONE (OUTPATIENT)
Age: 40
End: 2025-04-18

## 2025-04-18 NOTE — TELEPHONE ENCOUNTER
Per [facility] steven Browning and ANDREW Langford. Contacted patient to schedule talk therapy. Upon patient returning call please schedule if any availability.

## 2025-04-22 ENCOUNTER — TELEPHONE (OUTPATIENT)
Dept: PSYCHIATRY | Facility: CLINIC | Age: 40
End: 2025-04-22

## 2025-04-22 NOTE — TELEPHONE ENCOUNTER
Writer called and left a message notifying the patient that due to a change on the providers schedule,  the appt scheduled for 5/1 was moved to 5/6 at 12:00 PM. A call back was requested to confirm that the patient is able to make new appt.

## 2025-04-24 ENCOUNTER — APPOINTMENT (EMERGENCY)
Dept: RADIOLOGY | Facility: HOSPITAL | Age: 40
End: 2025-04-24
Payer: COMMERCIAL

## 2025-04-24 ENCOUNTER — HOSPITAL ENCOUNTER (EMERGENCY)
Facility: HOSPITAL | Age: 40
Discharge: HOME | End: 2025-04-24
Attending: EMERGENCY MEDICINE
Payer: COMMERCIAL

## 2025-04-24 VITALS
HEART RATE: 101 BPM | OXYGEN SATURATION: 98 % | TEMPERATURE: 98.2 F | RESPIRATION RATE: 18 BRPM | SYSTOLIC BLOOD PRESSURE: 128 MMHG | DIASTOLIC BLOOD PRESSURE: 66 MMHG

## 2025-04-24 DIAGNOSIS — M25.511 RIGHT SHOULDER PAIN: Primary | ICD-10-CM

## 2025-04-24 DIAGNOSIS — M54.10 RADICULOPATHY: ICD-10-CM

## 2025-04-24 PROCEDURE — 99283 EMERGENCY DEPT VISIT LOW MDM: CPT

## 2025-04-24 PROCEDURE — 99284 EMERGENCY DEPT VISIT MOD MDM: CPT | Performed by: EMERGENCY MEDICINE

## 2025-04-24 PROCEDURE — 73030 X-RAY EXAM OF SHOULDER: CPT

## 2025-04-24 RX ORDER — ACETAMINOPHEN 325 MG/1
650 TABLET ORAL EVERY 6 HOURS PRN
Qty: 30 TABLET | Refills: 0 | Status: SHIPPED | OUTPATIENT
Start: 2025-04-24

## 2025-04-24 RX ORDER — METHOCARBAMOL 500 MG/1
500 TABLET, FILM COATED ORAL 2 TIMES DAILY
Qty: 20 TABLET | Refills: 0 | Status: SHIPPED | OUTPATIENT
Start: 2025-04-24

## 2025-04-24 RX ORDER — ACETAMINOPHEN 325 MG/1
650 TABLET ORAL ONCE
Status: COMPLETED | OUTPATIENT
Start: 2025-04-24 | End: 2025-04-24

## 2025-04-24 RX ORDER — METHOCARBAMOL 500 MG/1
500 TABLET, FILM COATED ORAL ONCE
Status: COMPLETED | OUTPATIENT
Start: 2025-04-24 | End: 2025-04-24

## 2025-04-24 RX ORDER — IBUPROFEN 600 MG/1
600 TABLET, FILM COATED ORAL ONCE
Status: COMPLETED | OUTPATIENT
Start: 2025-04-24 | End: 2025-04-24

## 2025-04-24 RX ORDER — LIDOCAINE 50 MG/G
1 PATCH TOPICAL ONCE
Status: DISCONTINUED | OUTPATIENT
Start: 2025-04-24 | End: 2025-04-24 | Stop reason: HOSPADM

## 2025-04-24 RX ORDER — IBUPROFEN 600 MG/1
600 TABLET, FILM COATED ORAL EVERY 6 HOURS PRN
Qty: 30 TABLET | Refills: 0 | Status: SHIPPED | OUTPATIENT
Start: 2025-04-24

## 2025-04-24 RX ADMIN — ACETAMINOPHEN 650 MG: 325 TABLET, FILM COATED ORAL at 20:18

## 2025-04-24 RX ADMIN — METHOCARBAMOL 500 MG: 500 TABLET ORAL at 20:18

## 2025-04-24 RX ADMIN — LIDOCAINE 1 PATCH: 700 PATCH TOPICAL at 20:17

## 2025-04-24 RX ADMIN — IBUPROFEN 600 MG: 600 TABLET, FILM COATED ORAL at 20:18

## 2025-04-24 NOTE — TELEPHONE ENCOUNTER
Patient is ok with the time, but she stated taht the provider is calling to do a check up with her.

## 2025-04-24 NOTE — TELEPHONE ENCOUNTER
Patient scheduled with Deirdre Cope for talk therapy on 6/25/25 at 3pm virtual. Pt is an established patient of [facility].

## 2025-05-05 ENCOUNTER — TELEPHONE (OUTPATIENT)
Age: 40
End: 2025-05-05

## 2025-05-05 NOTE — TELEPHONE ENCOUNTER
Patient called stating the price of Ozempic has gone up and she can no longer afford it, she has been off of it for a few weeks now and she reports gaining weight and having trouble controlling her BG. She is asking what can be prescribed that's similar to Ozempic but cheaper?  Please advise

## 2025-05-06 NOTE — TELEPHONE ENCOUNTER
Please inform pt that, there is no generic brand available for Ozempic   We can discuss other medications, during her appt ( in June)   Or make earlier appt, if available

## 2025-05-08 ENCOUNTER — RESULTS FOLLOW-UP (OUTPATIENT)
Dept: FAMILY MEDICINE CLINIC | Facility: CLINIC | Age: 40
End: 2025-05-08

## 2025-05-08 ENCOUNTER — APPOINTMENT (OUTPATIENT)
Dept: LAB | Facility: CLINIC | Age: 40
End: 2025-05-08
Attending: FAMILY MEDICINE
Payer: COMMERCIAL

## 2025-05-08 LAB
ALBUMIN SERPL BCG-MCNC: 4.1 G/DL (ref 3.5–5)
ALP SERPL-CCNC: 102 U/L (ref 34–104)
ALT SERPL W P-5'-P-CCNC: 31 U/L (ref 7–52)
ANION GAP SERPL CALCULATED.3IONS-SCNC: 6 MMOL/L (ref 4–13)
AST SERPL W P-5'-P-CCNC: 22 U/L (ref 13–39)
BASOPHILS # BLD AUTO: 0.05 THOUSANDS/ÂΜL (ref 0–0.1)
BASOPHILS NFR BLD AUTO: 1 % (ref 0–1)
BILIRUB SERPL-MCNC: 0.84 MG/DL (ref 0.2–1)
BUN SERPL-MCNC: 10 MG/DL (ref 5–25)
CALCIUM SERPL-MCNC: 9 MG/DL (ref 8.4–10.2)
CHLORIDE SERPL-SCNC: 106 MMOL/L (ref 96–108)
CHOLEST SERPL-MCNC: 215 MG/DL (ref ?–200)
CO2 SERPL-SCNC: 25 MMOL/L (ref 21–32)
CREAT SERPL-MCNC: 0.62 MG/DL (ref 0.6–1.3)
CREAT UR-MCNC: 107.5 MG/DL
EOSINOPHIL # BLD AUTO: 0.36 THOUSAND/ÂΜL (ref 0–0.61)
EOSINOPHIL NFR BLD AUTO: 5 % (ref 0–6)
ERYTHROCYTE [DISTWIDTH] IN BLOOD BY AUTOMATED COUNT: 12.6 % (ref 11.6–15.1)
EST. AVERAGE GLUCOSE BLD GHB EST-MCNC: 103 MG/DL
GFR SERPL CREATININE-BSD FRML MDRD: 112 ML/MIN/1.73SQ M
GLUCOSE P FAST SERPL-MCNC: 97 MG/DL (ref 65–99)
HBA1C MFR BLD: 5.2 %
HCT VFR BLD AUTO: 38.6 % (ref 34.8–46.1)
HDLC SERPL-MCNC: 80 MG/DL
HGB BLD-MCNC: 12.6 G/DL (ref 11.5–15.4)
IMM GRANULOCYTES # BLD AUTO: 0.01 THOUSAND/UL (ref 0–0.2)
IMM GRANULOCYTES NFR BLD AUTO: 0 % (ref 0–2)
LDLC SERPL CALC-MCNC: 123 MG/DL (ref 0–100)
LYMPHOCYTES # BLD AUTO: 3.01 THOUSANDS/ÂΜL (ref 0.6–4.47)
LYMPHOCYTES NFR BLD AUTO: 39 % (ref 14–44)
MCH RBC QN AUTO: 32.7 PG (ref 26.8–34.3)
MCHC RBC AUTO-ENTMCNC: 32.6 G/DL (ref 31.4–37.4)
MCV RBC AUTO: 100 FL (ref 82–98)
MICROALBUMIN UR-MCNC: 10 MG/L
MICROALBUMIN/CREAT 24H UR: 9 MG/G CREATININE (ref 0–30)
MONOCYTES # BLD AUTO: 0.43 THOUSAND/ÂΜL (ref 0.17–1.22)
MONOCYTES NFR BLD AUTO: 6 % (ref 4–12)
NEUTROPHILS # BLD AUTO: 3.94 THOUSANDS/ÂΜL (ref 1.85–7.62)
NEUTS SEG NFR BLD AUTO: 49 % (ref 43–75)
NRBC BLD AUTO-RTO: 0 /100 WBCS
PLATELET # BLD AUTO: 346 THOUSANDS/UL (ref 149–390)
PMV BLD AUTO: 9.6 FL (ref 8.9–12.7)
POTASSIUM SERPL-SCNC: 3.4 MMOL/L (ref 3.5–5.3)
PROT SERPL-MCNC: 7.5 G/DL (ref 6.4–8.4)
RBC # BLD AUTO: 3.85 MILLION/UL (ref 3.81–5.12)
SODIUM SERPL-SCNC: 137 MMOL/L (ref 135–147)
TRIGL SERPL-MCNC: 62 MG/DL (ref ?–150)
TSH SERPL DL<=0.05 MIU/L-ACNC: 1.87 UIU/ML (ref 0.45–4.5)
WBC # BLD AUTO: 7.8 THOUSAND/UL (ref 4.31–10.16)

## 2025-05-28 ENCOUNTER — TELEPHONE (OUTPATIENT)
Age: 40
End: 2025-05-28

## 2025-05-28 ENCOUNTER — OFFICE VISIT (OUTPATIENT)
Dept: FAMILY MEDICINE CLINIC | Facility: CLINIC | Age: 40
End: 2025-05-28
Payer: COMMERCIAL

## 2025-05-28 VITALS
HEART RATE: 88 BPM | WEIGHT: 145 LBS | SYSTOLIC BLOOD PRESSURE: 118 MMHG | HEIGHT: 61 IN | BODY MASS INDEX: 27.38 KG/M2 | DIASTOLIC BLOOD PRESSURE: 78 MMHG | OXYGEN SATURATION: 99 %

## 2025-05-28 DIAGNOSIS — M75.01 ADHESIVE CAPSULITIS OF RIGHT SHOULDER: ICD-10-CM

## 2025-05-28 DIAGNOSIS — F32.1 MODERATE MAJOR DEPRESSION (HCC): ICD-10-CM

## 2025-05-28 DIAGNOSIS — Z12.31 ENCOUNTER FOR SCREENING MAMMOGRAM FOR BREAST CANCER: ICD-10-CM

## 2025-05-28 DIAGNOSIS — F41.9 SEVERE ANXIETY: ICD-10-CM

## 2025-05-28 DIAGNOSIS — E78.5 HYPERLIPIDEMIA LDL GOAL <70: ICD-10-CM

## 2025-05-28 DIAGNOSIS — E11.65 TYPE 2 DIABETES MELLITUS WITH HYPERGLYCEMIA, WITHOUT LONG-TERM CURRENT USE OF INSULIN (HCC): ICD-10-CM

## 2025-05-28 DIAGNOSIS — F17.219 CIGARETTE NICOTINE DEPENDENCE WITH NICOTINE-INDUCED DISORDER: ICD-10-CM

## 2025-05-28 DIAGNOSIS — Z00.00 ANNUAL PHYSICAL EXAM: Primary | ICD-10-CM

## 2025-05-28 PROCEDURE — 99396 PREV VISIT EST AGE 40-64: CPT | Performed by: FAMILY MEDICINE

## 2025-05-28 PROCEDURE — 99214 OFFICE O/P EST MOD 30 MIN: CPT | Performed by: FAMILY MEDICINE

## 2025-05-28 RX ORDER — ROSUVASTATIN CALCIUM 10 MG/1
10 TABLET, COATED ORAL
Qty: 90 TABLET | Refills: 1 | Status: SHIPPED | OUTPATIENT
Start: 2025-05-28

## 2025-05-28 RX ORDER — VARENICLINE TARTRATE 0.5 (11)-1
KIT ORAL
Qty: 53 EACH | Refills: 0 | Status: SHIPPED | OUTPATIENT
Start: 2025-05-28

## 2025-05-28 RX ORDER — LIDOCAINE 50 MG/G
1 PATCH TOPICAL DAILY
Qty: 14 PATCH | Refills: 0 | Status: SHIPPED | OUTPATIENT
Start: 2025-05-28

## 2025-05-28 NOTE — TELEPHONE ENCOUNTER
[place] Lidocaine 5 % SUBMITTED    to [employer]     via    []CMM-KEY:    [x]Surescripts-Case ID # [id number]   []Availity-Auth ID #  NDC #    []Faxed to plan   []Other website    []Phone call Case ID #      []PA sent as URGENT    All office notes, labs and other pertaining documents and studies sent. Clinical questions answered. Awaiting determination from insurance company.     Turnaround time for your insurance to make a decision on your Prior Authorization can take 7-21 business days.

## 2025-05-28 NOTE — PATIENT INSTRUCTIONS
"Patient Education     Routine physical for adults   The Basics   Written by the doctors and editors at [facility]   What is a physical? -- A physical is a routine visit, or \"check-up,\" with your doctor. You might also hear it called a \"wellness visit\" or \"preventive visit.\"  During each visit, the doctor will:   Ask about your physical and mental health   Ask about your habits, behaviors, and lifestyle   Do an exam   Give you vaccines if needed   Talk to you about any medicines you take   Give advice about your health   Answer your questions  Getting regular check-ups is an important part of taking care of your health. It can help your doctor find and treat any problems you have. But it's also important for preventing health problems.  A routine physical is different from a \"sick visit.\" A sick visit is when you see a doctor because of a health concern or problem. Since physicals are scheduled ahead of time, you can think about what you want to ask the doctor.  How often should I get a physical? -- It depends on your age and health. In general, for people age 21 years and older:   If you are younger than 50 years, you might be able to get a physical every 3 years.   If you are 50 years or older, your doctor might recommend a physical every year.  If you have an ongoing health condition, like diabetes or high blood pressure, your doctor will probably want to see you more often.  What happens during a physical? -- In general, each visit will include:   Physical exam - The doctor or nurse will check your height, weight, heart rate, and blood pressure. They will also look at your eyes and ears. They will ask about how you are feeling and whether you have any symptoms that bother you.   Medicines - It's a good idea to bring a list of all the medicines you take to each doctor visit. Your doctor will talk to you about your medicines and answer any questions. Tell them if you are having any side effects that bother you. You " "should also tell them if you are having trouble paying for any of your medicines.   Habits and behaviors - This includes:   Your diet   Your exercise habits   Whether you smoke, drink alcohol, or use drugs   Whether you are sexually active   Whether you feel safe at home  Your doctor will talk to you about things you can do to improve your health and lower your risk of health problems. They will also offer help and support. For example, if you want to quit smoking, they can give you advice and might prescribe medicines. If you want to improve your diet or get more physical activity, they can help you with this, too.   Lab tests, if needed - The tests you get will depend on your age and situation. For example, your doctor might want to check your:   Cholesterol   Blood sugar   Iron level   Vaccines - The recommended vaccines will depend on your age, health, and what vaccines you already had. Vaccines are very important because they can prevent certain serious or deadly infections.   Discussion of screening - \"Screening\" means checking for diseases or other health problems before they cause symptoms. Your doctor can recommend screening based on your age, risk, and preferences. This might include tests to check for:   Cancer, such as breast, prostate, cervical, ovarian, colorectal, prostate, lung, or skin cancer   Sexually transmitted infections, such as chlamydia and gonorrhea   Mental health conditions like depression and anxiety  Your doctor will talk to you about the different types of screening tests. They can help you decide which screenings to have. They can also explain what the results might mean.   Answering questions - The physical is a good time to ask the doctor or nurse questions about your health. If needed, they can refer you to other doctors or specialists, too.  Adults older than 65 years often need other care, too. As you get older, your doctor will talk to you about:   How to prevent falling at " home   Hearing or vision tests   Memory testing   How to take your medicines safely   Making sure that you have the help and support you need at home  All topics are updated as new evidence becomes available and our peer review process is complete.  This topic retrieved from [employer] on: May 02, 2024.  Topic 115363 Version 1.0  Release: 32.4.3 - C32.122  © 2024 UpToDate, Inc. and/or its affiliates. All rights reserved.  Consumer Information Use and Disclaimer   Disclaimer: This generalized information is a limited summary of diagnosis, treatment, and/or medication information. It is not meant to be comprehensive and should be used as a tool to help the user understand and/or assess potential diagnostic and treatment options. It does NOT include all information about conditions, treatments, medications, side effects, or risks that may apply to a specific patient. It is not intended to be medical advice or a substitute for the medical advice, diagnosis, or treatment of a health care provider based on the health care provider's examination and assessment of a patient's specific and unique circumstances. Patients must speak with a health care provider for complete information about their health, medical questions, and treatment options, including any risks or benefits regarding use of medications. This information does not endorse any treatments or medications as safe, effective, or approved for treating a specific patient. UpToDate, Inc. and its affiliates disclaim any warranty or liability relating to this information or the use thereof.The use of this information is governed by the Terms of Use, available at https://www.woltersGÃ¼venRehberiuwer.com/en/know/clinical-effectiveness-terms. 2024© UpToDate, Inc. and its affiliates and/or licensors. All rights reserved.  Copyright   © 2024 UpToDate, Inc. and/or its affiliates. All rights reserved.

## 2025-05-28 NOTE — ASSESSMENT & PLAN NOTE
- follows with Endo - A1c = 5.2 - stable on Ozempic   - stable renal function and urine microalbumin   - UTD with PCV20   - DM foot exam done in office on 1/21/2025   - last IRIS was done 2/2024   - RTO in 3months for f/u - pt aware and agreeable   Lab Results   Component Value Date    HGBA1C 5.2 05/08/2025     Orders:    IRIS Diabetic eye exam

## 2025-05-28 NOTE — ASSESSMENT & PLAN NOTE
- has restarted smoking - 5-6cigs/day but wants to quit   - Rx for Chantix sent   Orders:    Varenicline Tartrate, Starter, (Chantix Starting Month Pak) 0.5 MG X 11 & 1 MG X 42 TBPK; 0.5 mg once daily for 3 days then 0.5mg twice daily for days 4-7 then 1 mg twice daily

## 2025-05-28 NOTE — PROGRESS NOTES
Adult Annual Physical  Name: Gilma Montilla      : 1985      MRN: [id number]  Encounter Provider: Lacey England DO  Encounter Date: 2025   Encounter department: [facility]    :  Assessment & Plan  Annual physical exam  - reviewed PMHx, PSHx, meds, allergies, FHx, Soc Hx and Sexual Hx   - UTD with Tdap ()   - UTD with COVID IMMs but no Booster   - UTD with PCV20  - reviewed labs done 2025 - see below   - UTD with annual GYN exam and PAP (2025)   - script given to schedule Mammo for    - due for Colon Ca screening at 45yo   - declined STD screening in the office today  - UTD with Dental   - overdue for Ophtho - advised to schedule   - discussed diet and exercise  - RTO in 1yr for annual - pt aware and agreeable       Hyperlipidemia LDL goal <70  -  <-- 143   - goal given h/o DM = less than 70   - advised to restart Crestor 10mg QHS   - has not been taking her Zetia   - diet/exercise - DASH diet  - RTO in 3months, with labs, for f/u - pt aware and agreeable   - The 10-year ASCVD risk score (Jose M DK, et al., 2019) is: 0.9%    Values used to calculate the score:      Age: 40 years      Clincally relevant sex: Female      Is Non- : Yes      Diabetic: Yes      Tobacco smoker: Yes      Systolic Blood Pressure: 118 mmHg      Is BP treated: No      HDL Cholesterol: 80 mg/dL      Total Cholesterol: 215 mg/dL  Orders:    rosuvastatin (CRESTOR) 10 MG tablet; Take 1 tablet (10 mg total) by mouth daily at bedtime    Lipid panel; Future    Adhesive capsulitis of right shoulder  - was seen in ER on 2025 for eval of R-shoulder pain   - given Lidocaine patch (asking for new Rx), Methocarbamol, Tylenol   - nml Xray in ER   - referred to PT and Ortho   Orders:    Ambulatory Referral to Physical Therapy; Future    Ambulatory Referral to Orthopedic Surgery; Future    lidocaine (Lidoderm) 5 %; Apply 1 patch topically daily over 12 hours Remove & Discard  patch within 12 hours or as directed by MD    Type 2 diabetes mellitus with hyperglycemia, without long-term current use of insulin (HCC)  - follows with Endo - A1c = 5.2 - stable on Ozempic   - stable renal function and urine microalbumin   - UTD with PCV20   - DM foot exam done in office on 2025   - last IRIS was done 2024   - RTO in 3months for f/u - pt aware and agreeable   Lab Results   Component Value Date    HGBA1C 5.2 2025     Orders:    IRIS Diabetic eye exam    Encounter for screening mammogram for breast cancer    Orders:    Mammo screening bilateral w 3d and cad; Future    Cigarette nicotine dependence with nicotine-induced disorder  - has restarted smoking - 5-6cigs/day but wants to quit   - Rx for Chantix sent   Orders:    Varenicline Tartrate, Starter, (Chantix Starting Month ) 0.5 MG X 11 & 1 MG X 42 TBPK; 0.5 mg once daily for 3 days then 0.5mg twice daily for days 4-7 then 1 mg twice daily    Severe anxiety  - has been following with Psych (Q2-4wks) and will be starting up with a Therapist in 2wks        Moderate major depression (HCC)                         Preventive Screenings:    - Cervical cancer screening: screening up-to-date   - Breast cancer screening: screening up-to-date          History of Present Illness     Adult Annual Physical:  Patient presents for annual physical.   1) Annual   - Specialists: Endo,  Psych (Q2-4wks), starting with a Therapist in 2wks   - PMHx:  x3, seasonal allergies, STELLA, current smoker (5-6cigs/day), DM2, HL, Vit D deficiency, h/o kidney stone, BMI 27.4  - allergies: NKDA  - Meds: see med rec   - PSHx: none   - FHx: M (HTN), F (anemia), S ( - leukemia), S (uterine fibroids), cousin and niece (MS), MA (thyroid disease), MGM/PGM (DM), MGM (breast ca with brain mets - dx in her 50s), children with autism, denies FHx of colon ca/cervical ca/sudden cardiac death    - Immunizations: Tdap in 2016, UTD with COVID IMMs but no Booster, UTD with  "PCV20, declined annual Flu vaccine   - GYN Hx: last annual and PAP was 1/2025 (nml, denies h/o abnml PAPs), Nexplanon   - diet/exercise: trying to walk, regular diet   - social: current smoker - 0.25PPD/1yr (quit in 2021 - 0.25PPD/21yrs), denies EtOH, \"smoking weed\"  - sexual Hx: active with spouse, declined STD screening   - last vision: no glasses or contacts; overdue for Ophtho    - last dental: 2months ago   - ROS: today in the office pt denies F/C/N/V/HA/visual changes/CP/palpitations/SOB/wheezing/abd pain/D/LE edema  2) DM, HL, myalgia, STELLA   - reviewed labs done 5/8/2025   - has been following with Psych (Q2-4wks) and will be starting up with a Therapist in 2wks   - denies Pmhx of seizures or eating disorder   - (+) ongoing R-shoulder pain and unable to lift it - (+) numbness and tingling in her R-thumb   - was seen in ER on 4/2025 - nml Xray and was referred to Ortho - has not scheduled   .     Diet and Physical Activity:  - Diet/Nutrition: well balanced diet.  - Exercise: walking.    Depression Screening:    - PHQ-9 Score: 0    General Health:  - Sleep: sleeps well.  - Hearing: normal hearing bilateral ears.  - Vision: most recent eye exam > 1 year ago.  - Dental: regular dental visits.    /GYN Health:  - Follows with GYN: yes.   - History of STDs: no  - Contraception:. Nexplanon      Advanced Care Planning:  - Has an advanced directive?: no    - Has a durable medical POA?: no    - ACP document given to patient?: no      Review of Systems as per HPI       Objective   /78   Pulse 88   Ht 5' 1\" (1.549 m)   Wt 65.8 kg (145 lb)   SpO2 99%   BMI 27.40 kg/m²     Physical Exam  Vitals reviewed.   Constitutional:       General: She is not in acute distress.     Appearance: Normal appearance. She is not ill-appearing, toxic-appearing or diaphoretic.   HENT:      Head: Normocephalic and atraumatic.      Right Ear: External ear normal.      Left Ear: External ear normal.      Nose: Nose normal.     Eyes: "      General: No scleral icterus.        Right eye: No discharge.         Left eye: No discharge.      Extraocular Movements: Extraocular movements intact.      Conjunctiva/sclera: Conjunctivae normal.       Cardiovascular:      Rate and Rhythm: Normal rate and regular rhythm.   Pulmonary:      Effort: Pulmonary effort is normal. No respiratory distress.      Breath sounds: Normal breath sounds. No stridor. No wheezing, rhonchi or rales.   Abdominal:      Palpations: Abdomen is soft.     Musculoskeletal:         General: Tenderness present. No swelling.      Cervical back: Normal range of motion.      Right lower leg: No edema.      Left lower leg: No edema.      Comments: R-shoulder ROM limited 2/2 pain      Skin:     General: Skin is warm.     Neurological:      General: No focal deficit present.      Mental Status: She is alert and oriented to person, place, and time.     Psychiatric:         Mood and Affect: Mood normal.         Behavior: Behavior normal.

## 2025-05-30 NOTE — TELEPHONE ENCOUNTER
PA Lidocaine 5 %  DENIED    Reason:(Screenshot if applicable)        Message sent to office clinical pool Yes    Denial letter scanned into Media Yes    We can gladly do an appeal but the process can take about 30-60 days to provide determination. Please have the office staff schedule a Peer to Peer at phone  [phone] . If an appeal is truly warranted please have Provider send clinical documentation to the PA department to support the appeal.     **Please follow up with your patient regarding denial and next steps**

## 2025-06-03 DIAGNOSIS — M25.511 ACUTE PAIN OF RIGHT SHOULDER: Primary | ICD-10-CM

## 2025-06-03 RX ORDER — METHOCARBAMOL 500 MG/1
500 TABLET, FILM COATED ORAL 2 TIMES DAILY
Qty: 28 TABLET | Refills: 0 | Status: SHIPPED | OUTPATIENT
Start: 2025-06-03

## 2025-06-11 ENCOUNTER — OFFICE VISIT (OUTPATIENT)
Dept: OBGYN CLINIC | Facility: CLINIC | Age: 40
End: 2025-06-11
Payer: COMMERCIAL

## 2025-06-11 ENCOUNTER — HOSPITAL ENCOUNTER (OUTPATIENT)
Dept: RADIOLOGY | Facility: HOSPITAL | Age: 40
Discharge: HOME | End: 2025-06-11
Attending: PHYSICIAN ASSISTANT
Payer: COMMERCIAL

## 2025-06-11 VITALS — WEIGHT: 145.6 LBS | HEIGHT: 61 IN | BODY MASS INDEX: 27.49 KG/M2

## 2025-06-11 DIAGNOSIS — M75.01 ADHESIVE CAPSULITIS OF RIGHT SHOULDER: Primary | ICD-10-CM

## 2025-06-11 DIAGNOSIS — M54.12 RADICULOPATHY, CERVICAL REGION: ICD-10-CM

## 2025-06-11 PROCEDURE — 99244 OFF/OP CNSLTJ NEW/EST MOD 40: CPT | Performed by: ORTHOPAEDIC SURGERY

## 2025-06-11 PROCEDURE — 72050 X-RAY EXAM NECK SPINE 4/5VWS: CPT

## 2025-06-11 RX ORDER — METHYLPREDNISOLONE 4 MG/1
TABLET ORAL
Qty: 21 TABLET | Refills: 0 | Status: SHIPPED | OUTPATIENT
Start: 2025-06-11

## 2025-06-11 NOTE — PROGRESS NOTES
[facility] [address] [address] [phone]       Gilma Montilla  [id number]  1985    ORTHOPAEDIC SURGERY OUTPATIENT NOTE  6/11/2025    :  Assessment & Plan  Radiculopathy, cervical region    Orders:    XR spine cervical complete 4 or 5 vw non injury; Future    methylPREDNISolone 4 MG tablet therapy pack; Use as directed on package    Ambulatory referral to Physical Therapy; Future    Adhesive capsulitis of right shoulder  Discussed with the patient her imaging findings.  She has some calcification and loss of cervical lordosis but her symptoms are stemming from adhesive capsulitis.  Traditional Medrol Dosepak and physical therapy for shoulder range of motion.  Follow-up in 8 weeks.  Orders:    methylPREDNISolone 4 MG tablet therapy pack; Use as directed on package    Ambulatory referral to Physical Therapy; Future           HISTORY:  40 y.o. female presents for right shoulder pain and stiffness for 2 months.  No fall or trauma.  She does have pain in the right side of her neck with symptoms rating down the arm.  She reports some numbness and tingling in the hand.  She is treated with over-the-counter pain medication and lidocaine patches.  She is diabetic on Ozempic A1c is well-controlled.    Past Medical History[1]    Past Surgical History[2]    Social History     Socioeconomic History    Marital status: /Civil Union     Spouse name: Not on file    Number of children: Not on file    Years of education: Not on file    Highest education level: Not on file   Occupational History    Occupation: Housewife or Homemaker   Tobacco Use    Smoking status: Some Days     Current packs/day: 0.25     Average packs/day: 0.3 packs/day for 20.0 years (5.0 ttl pk-yrs)     Types: Cigarettes    Smokeless tobacco: Never    Tobacco comments:     currently 7cigs/day   Vaping Use    Vaping status: Never Used   Substance and Sexual  Activity    Alcohol use: Yes     Comment: monthly    Drug use: Never    Sexual activity: Yes     Partners: Male     Birth control/protection: None     Comment: declined STD screening    Other Topics Concern    Not on file   Social History Narrative    Always uses seat belt    Caffeine use    Muslim Quaker (disciples of Alexi)     Social Drivers of Health     Financial Resource Strain: Not on file   Food Insecurity: No Food Insecurity (5/28/2025)    Nursing - Inadequate Food Risk Classification     Worried About Running Out of Food in the Last Year: Never true     Ran Out of Food in the Last Year: Never true     Ran Out of Food in the Last Year: Not on file   Transportation Needs: No Transportation Needs (5/28/2025)    PRAPARE - Transportation     Lack of Transportation (Medical): No     Lack of Transportation (Non-Medical): No   Physical Activity: Not on file   Stress: Not on file   Social Connections: Not on file   Intimate Partner Violence: Not on file   Housing Stability: Low Risk  (5/28/2025)    Housing Stability Vital Sign     Unable to Pay for Housing in the Last Year: No     Number of Times Moved in the Last Year: 0     Homeless in the Last Year: No       Family History[3]     Patient's Medications   New Prescriptions    No medications on file   Previous Medications    ACETAMINOPHEN (TYLENOL) 325 MG TABLET    Take 2 tablets (650 mg total) by mouth every 6 (six) hours as needed for mild pain    ESCITALOPRAM (LEXAPRO) 10 MG TABLET    Take 1 tablet (10 mg total) by mouth daily    ESCITALOPRAM (LEXAPRO) 10 MG TABLET    Take 1 tablet (10 mg total) by mouth daily    EZETIMIBE (ZETIA) 10 MG TABLET    Take 1 tablet (10 mg total) by mouth daily    HYDROXYZINE HCL (ATARAX) 25 MG TABLET    1 tab HS PRN for sleep    IBUPROFEN (MOTRIN) 600 MG TABLET    Take 1 tablet (600 mg total) by mouth every 6 (six) hours as needed for mild pain    LIDOCAINE (LIDODERM) 5 %    Apply 1 patch topically daily over 12 hours Remove &  "Discard patch within 12 hours or as directed by MD    LORAZEPAM (ATIVAN) 0.5 MG TABLET    1 tab BID PRN for anxiety    METHOCARBAMOL (ROBAXIN) 500 MG TABLET    Take 1 tablet (500 mg total) by mouth 2 (two) times a day    ROSUVASTATIN (CRESTOR) 10 MG TABLET    Take 1 tablet (10 mg total) by mouth daily at bedtime    SEMAGLUTIDE, 2 MG/DOSE, (OZEMPIC, 2 MG/DOSE,) 8 MG/ ML INJECTION PEN    Inject 0.75 mL (2 mg total) under the skin every 7 days    VARENICLINE TARTRATE, STARTER, (CHANTIX STARTING MONTH PAK) 0.5 MG X 11 & 1 MG X 42 TBPK    0.5 mg once daily for 3 days then 0.5mg twice daily for days 4-7 then 1 mg twice daily   Modified Medications    No medications on file   Discontinued Medications    No medications on file       Allergies[4]     Ht 5' 1\" (1.549 m)   Wt 66 kg (145 lb 9.6 oz)   BMI 27.51 kg/m²      REVIEW OF SYSTEMS:  Constitutional: Negative.    HEENT: Negative.    Respiratory: Negative.    Skin: Negative.    Neurological: Negative.    Psychiatric/Behavioral: Negative.  Musculoskeletal: Negative except for that mentioned in the HPI.    Gen: No acute distress, resting comfortably in bed  HEENT: Eyes clear, moist mucus membranes, hearing intact  Respiratory: No audible wheezing or stridor  Cardiovascular: Well Perfused peripherally, 2+ distal pulse  Abdomen: nondistended, no peritoneal signs     PHYSICAL EXAM:    RIGHT SHOULDER:    Appearance: Normal    Forward flexion:   140 degrees   Abduction:  30 degrees   External rotation at 0 degrees:   70 degrees   Internal rotation: right buttock     STRENGTH:  Forward flexion:  5/5   External rotation:  5/5   Internal rotation:  5/5       Radial/median/ulnar nerve intact    <2 sec cap refill    CERVICAL SPINE:   Full rotation, side bending, flexion and extension without radiating pain  Spurling's: Negative    Negative Ashby    IMAGING:    X-rays of the right shoulder reviewed.  These demonstrate no acute fracture or subluxation.  X-rays of the cervical spine " were taken the office today.  These reviewed demonstrate calcification of the anterior longitudinal ligament however there is no listhesis or subluxation.  No significant degenerative changes.         [1]   Past Medical History:  Diagnosis Date    Acid reflux     Allergic     seasonal    Anxiety     Blurred vision     Diabetes mellitus (HCC)     Fainting     Headache     Irregular heart beat     Palpitations    Loss of appetite     Obesity     Spontaneous vaginal delivery     x3    Weakness    [2]   Past Surgical History:  Procedure Laterality Date    FL RETROGRADE PYELOGRAM  1/4/2020    FL RETROGRADE PYELOGRAM  1/22/2020    NO PAST SURGERIES      SD CYSTO/URETERO W/LITHOTRIPSY &INDWELL STENT INSRT Left 1/4/2020    Procedure: CYSTOSCOPY URETEROSCOPY , RETROGRADE PYELOGRAM AND INSERTION STENT URETERAL;  Surgeon: Tyler Atwood MD;  Location: [facility];  Service: Urology    SD CYSTO/URETERO W/LITHOTRIPSY &INDWELL STENT INSRT Left 1/22/2020    Procedure: CYSTOSCOPY URETEROSCOPY WITH LITHOTRIPSY HOLMIUM LASER, RETROGRADE PYELOGRAM AND INSERTION STENT URETERAL;  Surgeon: Sarthak Tay MD;  Location: [facility];  Service: Urology   [3]   Family History  Problem Relation Name Age of Onset    Hypertension Mother      Anemia Father      Leukemia Sister      Fibroids Sister      No Known Problems Sister      No Known Problems Sister      Breast cancer Maternal Grandmother  45    Diabetes Maternal Grandmother      Diabetes Paternal Grandmother      No Known Problems Brother      Multiple sclerosis Cousin      Thyroid disease Other Aunt     Autism Other Child     Colon cancer Neg Hx      Cervical cancer Neg Hx      Stroke Neg Hx     [4]   Allergies  Allergen Reactions    Other Itching     apples

## 2025-06-23 ENCOUNTER — OFFICE VISIT (OUTPATIENT)
Dept: ENDOCRINOLOGY | Facility: CLINIC | Age: 40
End: 2025-06-23
Payer: COMMERCIAL

## 2025-06-23 VITALS
WEIGHT: 141 LBS | DIASTOLIC BLOOD PRESSURE: 66 MMHG | HEIGHT: 61 IN | HEART RATE: 113 BPM | OXYGEN SATURATION: 98 % | BODY MASS INDEX: 26.62 KG/M2 | SYSTOLIC BLOOD PRESSURE: 118 MMHG

## 2025-06-23 DIAGNOSIS — E11.65 TYPE 2 DIABETES MELLITUS WITH HYPERGLYCEMIA, WITHOUT LONG-TERM CURRENT USE OF INSULIN (HCC): Primary | ICD-10-CM

## 2025-06-23 DIAGNOSIS — E55.9 VITAMIN D DEFICIENCY: ICD-10-CM

## 2025-06-23 DIAGNOSIS — E78.2 MIXED HYPERLIPIDEMIA: ICD-10-CM

## 2025-06-23 PROCEDURE — 99214 OFFICE O/P EST MOD 30 MIN: CPT | Performed by: PHYSICIAN ASSISTANT

## 2025-06-23 NOTE — PATIENT INSTRUCTIONS
Hypoglycemia instructions   Gilma Montilla  6/23/2025  [id number]    Low Blood Sugar    Steps to treat low blood sugar.    1. Test blood sugar if you have symptoms of low blood sugar:   Low Blood Sugar Symptoms:  o Sweaty  o Dizzy  o Rapid heartbeat  o Shaky  o Bad mood  o Hungry      2. Treat blood sugar less than 70 with 15 grams of fast-acting carbohydrate:   Examples of 15 grams Fast-Acting Carbohydrate:  o 4 oz juice  o 4 oz regular soda  o 3-4 glucose tablets (chew)  o 3-4 hard candies (chew)          3.  Wait 15 minutes and test your blood sugar again     4. If blood sugar is less than 100, repeat steps 2-3.    5. When your blood sugar is 100 or more, eat a snack if it will be longer than one hour until your next meal. The snack should be 15 grams of carbohydrate and a protein:   Examples of snacks:  o ½ sandwich  o 6 crackers with cheese  o Piece of fruit with cheese or peanut butter  o 6 crackers with peanut butter     Yes...

## 2025-06-23 NOTE — PROGRESS NOTES
Name: Gilma Montilla      : 1985      MRN: [id number]  Encounter Provider: Va Galloway PA-C  Encounter Date: 2025   Encounter department: [facility] [place]    CC: DM  Assessment & Plan  Type 2 diabetes mellitus with hyperglycemia, without long-term current use of insulin ([facility])  HGA1C 5.2%. Worsened.  Treatment regimen: continue current treatment  Episodes of hypoglycemia can lead to permanent disability and death.  Discussed risks/complications associated with uncontrolled diabetes.    Advised to adhere to diabetic diet, and recommended staying active/exercising routinely.  Keep carbohydrates consistent to limit blood glucose fluctuations.  Advised to call if blood sugars less than 70 mg/dl or over 300 mg/dl.   Check blood glucose 1 time a day  Discussed symptoms and treatment of hypoglycemia.   Recommended routine follow-up with podiatry and ophthalmology.   Ordered blood work to complete prior to next visit.  Lab Results   Component Value Date    HGBA1C 5.2 2025            Mixed hyperlipidemia  , improved  On statin therapy  Managed by PCP       Vitamin D deficiency  Vitamin D previously low at 22.4  Take vitamin D3 2000 Iu daily                 History of Present Illness     Gilma Montilla is a 40 y.o. female with a history of type 2 diabetes without long term use of insulin. Diabetes course has been stable. Diagnosed in 2021 at which time A1C was 11.7%. Denies recent severe hypoglycemic or severe hyperglycemic episodes. Denies any issues with her current regimen. Home glucose monitoring: are not performed      Current regimen: Ozempic 2 mg weekly  compliant most of the time, denies any side effects from medications.  She admits to missing Ozempic for a couple months but has been back on it for a few weeks now  Injects in: abdomen. Rotates sites: Yes  Hypoglycemic episodes: No, rare  H/o of hypoglycemia causing hospitalization or  "Intervention such as glucagon injection or ambulance call : No  Hypoglycemia symptoms: jitteriness and sweating  Treatment of hypoglycemia: orange juice     Medic alert tag: recommended: Yes     Diabetes education: Yes  Diet: 3 meals per day, 3 large snacks per day. Timing of meals is predictable.   Diabetic diet compliance: compliant some of the time  Activity: Daily activity is predictable: Yes. She exercises daily.      Not on ACE inhibitor/ARB  Has hyperlipidemia: on statin   Thyroid disorders: No  History of pancreatitis: No     Vitamin D deficiency: she is taking vitamin D3 but is unsure of the dose       Review of Systems   Constitutional:  Negative for activity change, appetite change, fatigue and unexpected weight change.   HENT:  Negative for trouble swallowing.    Eyes:  Positive for visual disturbance (states it harder to now read things upclose.).   Respiratory:  Negative for shortness of breath.    Cardiovascular:  Negative for chest pain and palpitations.   Gastrointestinal:  Negative for constipation and diarrhea.   Endocrine: Negative for polydipsia and polyuria.   Musculoskeletal:  Positive for arthralgias.   Neurological:  Negative for numbness (tingling in right arm).   Psychiatric/Behavioral: Negative.      as per HPI    Medications Ordered Prior to Encounter[1]      Medical History Reviewed by provider this encounter:  Tobacco  Allergies  Meds  Problems  Med Hx  Surg Hx  Fam Hx     .    Objective   /66   Pulse (!) 113   Ht 5' 1\" (1.549 m)   Wt 64 kg (141 lb)   SpO2 98%   BMI 26.64 kg/m²      Body mass index is 26.64 kg/m².  Wt Readings from Last 3 Encounters:   06/23/25 64 kg (141 lb)   06/11/25 66 kg (145 lb 9.6 oz)   05/28/25 65.8 kg (145 lb)     Physical Exam  Vitals and nursing note reviewed.   Constitutional:       Appearance: She is well-developed.   HENT:      Head: Normocephalic.     Eyes:      General: No scleral icterus.    Neck:      Thyroid: No thyromegaly. "     Cardiovascular:      Rate and Rhythm: Normal rate and regular rhythm.      Pulses:           Radial pulses are 2+ on the right side and 2+ on the left side.      Heart sounds: No murmur heard.  Pulmonary:      Effort: Pulmonary effort is normal. No respiratory distress.      Breath sounds: Normal breath sounds. No wheezing.     Musculoskeletal:      Cervical back: Neck supple.     Skin:     General: Skin is warm and dry.     Neurological:      Mental Status: She is alert.       Last Eye Exam: 02/12/2024  Last Foot Exam: 01/21/2025  Health Maintenance   Topic Date Due    Diabetic Foot Exam  01/21/2026    Diabetic Eye Exam  02/12/2026         Labs: I have reviewed pertinent labs including:   Lab Results   Component Value Date    HGBA1C 5.2 05/08/2025    HGBA1C 5.0 10/12/2024    HGBA1C 5.0 06/11/2024      Lab Results   Component Value Date    CREATININE 0.62 05/08/2025    CREATININE 0.64 10/12/2024    CREATININE 0.63 06/11/2024    BUN 10 05/08/2025    K 3.4 (L) 05/08/2025     05/08/2025    CO2 25 05/08/2025      eGFR   Date Value Ref Range Status   05/08/2025 112 ml/min/1.73sq m Final      HDL, Direct   Date Value Ref Range Status   05/08/2025 80 >=50 mg/dL Final     Triglycerides   Date Value Ref Range Status   05/08/2025 62 See Comment mg/dL Final     Comment:     Triglyceride:     0-9Y            <75mg/dL     10Y-17Y         <90 mg/dL       >=18Y     Normal          <150 mg/dL     Borderline High 150-199 mg/dL     High            200-499 mg/dL        Very High       >499 mg/dL    Specimen collection should occur prior to Metamizole administration due to the potential for falsely depressed results.      ALT   Date Value Ref Range Status   05/08/2025 31 7 - 52 U/L Final     Comment:     Specimen collection should occur prior to Sulfasalazine administration due to the potential for falsely depressed results.      AST   Date Value Ref Range Status   05/08/2025 22 13 - 39 U/L Final     Alkaline Phosphatase    Date Value Ref Range Status   05/08/2025 102 34 - 104 U/L Final      Lab Results   Component Value Date    DCB0YIPLMTYC 1.873 05/08/2025      Lab Results   Component Value Date    FREET4 0.98 06/11/2024      Lab Results   Component Value Date    POIH22NUMFKW 22.4 (L) 10/12/2024    QYYB70TVPOTL 30.0 06/11/2024    [id number] 27.1 (L) 11/10/2023        Patient Instructions     Hypoglycemia instructions   Gilma Montilla  6/23/2025  [id number]    Low Blood Sugar    Steps to treat low blood sugar.    1. Test blood sugar if you have symptoms of low blood sugar:   Low Blood Sugar Symptoms:  o Sweaty  o Dizzy  o Rapid heartbeat  o Shaky  o Bad mood  o Hungry      2. Treat blood sugar less than 70 with 15 grams of fast-acting carbohydrate:   Examples of 15 grams Fast-Acting Carbohydrate:  o 4 oz juice  o 4 oz regular soda  o 3-4 glucose tablets (chew)  o 3-4 hard candies (chew)          3.  Wait 15 minutes and test your blood sugar again     4. If blood sugar is less than 100, repeat steps 2-3.    5. When your blood sugar is 100 or more, eat a snack if it will be longer than one hour until your next meal. The snack should be 15 grams of carbohydrate and a protein:   Examples of snacks:  o ½ sandwich  o 6 crackers with cheese  o Piece of fruit with cheese or peanut butter  o 6 crackers with peanut butter      Discussed with the patient and all questioned fully answered. She will call me if any problems arise.    Administrative Statements   I have spent a total time of 30 minutes in caring for this patient on the day of the visit/encounter including Importance of tx compliance, Documenting in the medical record, Reviewing/placing orders in the medical record (including tests, medications, and/or procedures), and Obtaining or reviewing history  .         [1]   Current Outpatient Medications on File Prior to Visit   Medication Sig Dispense Refill    acetaminophen (TYLENOL) 325 mg tablet Take 2 tablets (650 mg total) by mouth  every 6 (six) hours as needed for mild pain 30 tablet 0    escitalopram (LEXAPRO) 10 mg tablet Take 1 tablet (10 mg total) by mouth daily 30 tablet 1    escitalopram (Lexapro) 10 mg tablet Take 1 tablet (10 mg total) by mouth daily 30 tablet 1    hydrOXYzine HCL (ATARAX) 25 mg tablet 1 tab HS PRN for sleep      ibuprofen (MOTRIN) 600 mg tablet Take 1 tablet (600 mg total) by mouth every 6 (six) hours as needed for mild pain 30 tablet 0    lidocaine (Lidoderm) 5 % Apply 1 patch topically daily over 12 hours Remove & Discard patch within 12 hours or as directed by MD 14 patch 0    LORazepam (Ativan) 0.5 mg tablet 1 tab BID PRN for anxiety 60 tablet 1    methocarbamol (ROBAXIN) 500 mg tablet Take 1 tablet (500 mg total) by mouth 2 (two) times a day 28 tablet 0    methylPREDNISolone 4 MG tablet therapy pack Use as directed on package 21 tablet 0    rosuvastatin (CRESTOR) 10 MG tablet Take 1 tablet (10 mg total) by mouth daily at bedtime 90 tablet 1    semaglutide, 2 mg/dose, (Ozempic, 2 MG/DOSE,) 8 mg/ mL injection pen Inject 0.75 mL (2 mg total) under the skin every 7 days 9 mL 1    Varenicline Tartrate, Starter, (Chantix Starting Month Pak) 0.5 MG X 11 & 1 MG X 42 TBPK 0.5 mg once daily for 3 days then 0.5mg twice daily for days 4-7 then 1 mg twice daily 53 each 0    ezetimibe (ZETIA) 10 mg tablet Take 1 tablet (10 mg total) by mouth daily (Patient not taking: Reported on 6/11/2025) 90 tablet 1     Current Facility-Administered Medications on File Prior to Visit   Medication Dose Route Frequency Provider Last Rate Last Admin    etonogestrel (NEXPLANON) subdermal implant 68 mg  68 mg Subdermal Once every 3 years    68 mg at 01/15/25 1130

## 2025-06-23 NOTE — ASSESSMENT & PLAN NOTE
HGA1C 5.2%. Worsened.  Treatment regimen: continue current treatment  Episodes of hypoglycemia can lead to permanent disability and death.  Discussed risks/complications associated with uncontrolled diabetes.    Advised to adhere to diabetic diet, and recommended staying active/exercising routinely.  Keep carbohydrates consistent to limit blood glucose fluctuations.  Advised to call if blood sugars less than 70 mg/dl or over 300 mg/dl.   Check blood glucose 1 time a day  Discussed symptoms and treatment of hypoglycemia.   Recommended routine follow-up with podiatry and ophthalmology.   Ordered blood work to complete prior to next visit.  Lab Results   Component Value Date    HGBA1C 5.2 05/08/2025

## 2025-06-25 ENCOUNTER — TELEPHONE (OUTPATIENT)
Dept: BEHAVIORAL/MENTAL HEALTH CLINIC | Facility: CLINIC | Age: 40
End: 2025-06-25

## 2025-06-25 NOTE — TELEPHONE ENCOUNTER
NO-SHOW LETTER MAILED TO Gilma Montilla.  ADDRESS: [address] [phone] for appt 6/25/25 for FAIZA Cope

## 2025-06-25 NOTE — LETTER
25     Gilma Montilla   : 1985   [address] [address]       It is the policy of [facility] to monitor and manage appointments that have been no-showed or cancelled with less than 48-hour notice. This is necessary to ensure that we are able to provide timely access for all patients to our providers. Undue numbers of unutilized appointments delays necessary medical care for all patients.      Dear Gilma Montilla       We are sorry that you missed your appointment with Deirdre Cope MA, NCC, LPC on 2025.  Your health and follow-up care are important to us. As this was a New Patient appointment, you will need to contact the office at [phone] if you would like to reschedule.    Please be aware that our office policy states that if you 'no show' two or more Therapy appointments without prior notice of cancellation within in a calendar year, you may be discharged from Therapy treatment.  We want to bring this to your attention now to prevent an interruption of your care.  If you have any questions about this policy, please call us at the number above.     If we do not hear from you within 10 business days to make a follow up appointment, we will assume you are no longer interested in care here.    Thank you in advance for your cooperation and assistance.       Sincerely,      [facility] Support Staff.

## 2025-08-11 ENCOUNTER — TELEPHONE (OUTPATIENT)
Dept: PSYCHIATRY | Facility: CLINIC | Age: 40
End: 2025-08-11

## 2025-08-22 ENCOUNTER — NURSE TRIAGE (OUTPATIENT)
Age: 40
End: 2025-08-22

## (undated) DEVICE — UROCATCH BAG

## (undated) DEVICE — SHEATH URETERAL ACCESS 12/14FR 35CM PROXIS

## (undated) DEVICE — PACK TUR

## (undated) DEVICE — SKIN MARKER DUAL TIP WITH RULER CAP, FLEXIBLE RULER AND LABELS: Brand: DEVON

## (undated) DEVICE — STERILE SURGICAL LUBRICANT,  TUBE: Brand: SURGILUBE

## (undated) DEVICE — CHLORHEXIDINE 4PCT 4 OZ

## (undated) DEVICE — CATH URET .038 10FR 50CM DUAL LUMEN

## (undated) DEVICE — GLOVE SRG BIOGEL ECLIPSE 7.5

## (undated) DEVICE — GUIDEWIRE STRGHT TIP 0.035 IN  SOLO PLUS

## (undated) DEVICE — GLOVE SRG BIOGEL 8

## (undated) DEVICE — PREMIUM DRY TRAY LF: Brand: MEDLINE INDUSTRIES, INC.

## (undated) DEVICE — BASKET SPECIMEN RETRIVAL 1.9FR 120CM